# Patient Record
Sex: MALE | Race: WHITE | Employment: OTHER | URBAN - METROPOLITAN AREA
[De-identification: names, ages, dates, MRNs, and addresses within clinical notes are randomized per-mention and may not be internally consistent; named-entity substitution may affect disease eponyms.]

---

## 2017-03-14 ENCOUNTER — HOSPITAL ENCOUNTER (OUTPATIENT)
Dept: GENERAL RADIOLOGY | Age: 77
Discharge: HOME | End: 2017-03-14
Attending: FAMILY MEDICINE | Admitting: FAMILY MEDICINE

## 2017-03-14 ENCOUNTER — OFFICE VISIT (OUTPATIENT)
Dept: FAMILY MEDICINE CLINIC | Age: 77
End: 2017-03-14

## 2017-03-14 VITALS
SYSTOLIC BLOOD PRESSURE: 122 MMHG | OXYGEN SATURATION: 96 % | HEART RATE: 59 BPM | WEIGHT: 190.2 LBS | BODY MASS INDEX: 25.8 KG/M2 | DIASTOLIC BLOOD PRESSURE: 74 MMHG | TEMPERATURE: 96.4 F

## 2017-03-14 DIAGNOSIS — R07.89 ACUTE CHEST WALL PAIN: Primary | ICD-10-CM

## 2017-03-14 DIAGNOSIS — K52.9 GASTROENTERITIS: ICD-10-CM

## 2017-03-14 DIAGNOSIS — R07.89 OTHER CHEST PAIN: ICD-10-CM

## 2017-03-14 PROCEDURE — 99214 OFFICE O/P EST MOD 30 MIN: CPT | Performed by: FAMILY MEDICINE

## 2017-03-14 RX ORDER — CIPROFLOXACIN 250 MG/1
250 TABLET, FILM COATED ORAL 2 TIMES DAILY
Qty: 10 TABLET | Refills: 0 | Status: SHIPPED | OUTPATIENT
Start: 2017-03-14 | End: 2017-03-19

## 2017-03-14 RX ORDER — NAPROXEN 375 MG/1
375 TABLET ORAL EVERY 12 HOURS PRN
Qty: 60 TABLET | Refills: 0 | Status: SHIPPED | OUTPATIENT
Start: 2017-03-14 | End: 2017-06-21

## 2017-03-14 RX ORDER — BACLOFEN 10 MG/1
10 TABLET ORAL 2 TIMES DAILY
Qty: 20 TABLET | Refills: 0 | Status: SHIPPED | OUTPATIENT
Start: 2017-03-14 | End: 2017-06-21

## 2017-06-21 ENCOUNTER — OFFICE VISIT (OUTPATIENT)
Dept: FAMILY MEDICINE CLINIC | Age: 77
End: 2017-06-21

## 2017-06-21 VITALS
TEMPERATURE: 97 F | HEART RATE: 75 BPM | DIASTOLIC BLOOD PRESSURE: 78 MMHG | BODY MASS INDEX: 26.96 KG/M2 | WEIGHT: 198.8 LBS | OXYGEN SATURATION: 98 % | SYSTOLIC BLOOD PRESSURE: 138 MMHG

## 2017-06-21 DIAGNOSIS — E78.5 HYPERLIPIDEMIA ASSOCIATED WITH TYPE 2 DIABETES MELLITUS (HCC): ICD-10-CM

## 2017-06-21 DIAGNOSIS — Z23 NEED FOR PNEUMOCOCCAL VACCINATION: ICD-10-CM

## 2017-06-21 DIAGNOSIS — K21.9 GASTROESOPHAGEAL REFLUX DISEASE WITHOUT ESOPHAGITIS: ICD-10-CM

## 2017-06-21 DIAGNOSIS — F41.9 ANXIETY: ICD-10-CM

## 2017-06-21 DIAGNOSIS — I10 ESSENTIAL HYPERTENSION: ICD-10-CM

## 2017-06-21 DIAGNOSIS — E11.69 HYPERLIPIDEMIA ASSOCIATED WITH TYPE 2 DIABETES MELLITUS (HCC): ICD-10-CM

## 2017-06-21 DIAGNOSIS — E11.9 TYPE 2 DIABETES MELLITUS WITHOUT COMPLICATION, WITHOUT LONG-TERM CURRENT USE OF INSULIN (HCC): Primary | ICD-10-CM

## 2017-06-21 LAB — HBA1C MFR BLD: 7 %

## 2017-06-21 PROCEDURE — 99214 OFFICE O/P EST MOD 30 MIN: CPT | Performed by: FAMILY MEDICINE

## 2017-06-21 PROCEDURE — 83036 HEMOGLOBIN GLYCOSYLATED A1C: CPT | Performed by: FAMILY MEDICINE

## 2017-06-21 PROCEDURE — 3288F FALL RISK ASSESSMENT DOCD: CPT | Performed by: FAMILY MEDICINE

## 2017-06-21 PROCEDURE — G8510 SCR DEP NEG, NO PLAN REQD: HCPCS | Performed by: FAMILY MEDICINE

## 2017-06-21 PROCEDURE — G0009 ADMIN PNEUMOCOCCAL VACCINE: HCPCS | Performed by: FAMILY MEDICINE

## 2017-06-21 PROCEDURE — 90670 PCV13 VACCINE IM: CPT | Performed by: FAMILY MEDICINE

## 2017-06-21 RX ORDER — ESOMEPRAZOLE MAGNESIUM 40 MG/1
40 CAPSULE, DELAYED RELEASE ORAL
Qty: 90 CAPSULE | Refills: 1 | Status: SHIPPED | OUTPATIENT
Start: 2017-06-21 | End: 2017-12-19 | Stop reason: SDUPTHER

## 2017-06-21 RX ORDER — ATORVASTATIN CALCIUM 20 MG/1
20 TABLET, FILM COATED ORAL DAILY
Qty: 90 TABLET | Refills: 1 | Status: SHIPPED | OUTPATIENT
Start: 2017-06-21 | End: 2017-12-19 | Stop reason: SDUPTHER

## 2017-06-21 ASSESSMENT — PATIENT HEALTH QUESTIONNAIRE - PHQ9
1. LITTLE INTEREST OR PLEASURE IN DOING THINGS: 0
SUM OF ALL RESPONSES TO PHQ QUESTIONS 1-9: 0
2. FEELING DOWN, DEPRESSED OR HOPELESS: 0
SUM OF ALL RESPONSES TO PHQ9 QUESTIONS 1 & 2: 0

## 2017-06-22 LAB
A/G RATIO: 1.4 (CALC) (ref 0.8–2.6)
ALBUMIN SERPL-MCNC: 4.4 GM/DL (ref 3.5–5.2)
ALP BLD-CCNC: 49 U/L (ref 23–144)
ALT SERPL-CCNC: 32 U/L (ref 0–60)
AST SERPL-CCNC: 33 U/L (ref 0–55)
BILIRUB SERPL-MCNC: 0.6 MG/DL (ref 0–1.2)
BUN / CREAT RATIO: 16 (CALC) (ref 7–25)
BUN BLDV-MCNC: 19 MG/DL (ref 3–29)
CALCIUM SERPL-MCNC: 10.1 MG/DL (ref 8.5–10.5)
CHLORIDE BLD-SCNC: 102 MEQ/L (ref 96–110)
CHOLESTEROL, TOTAL: 99 MG/DL
CO2: 24 MEQ/L (ref 19–32)
COPY(IES) SENT TO:: NORMAL
CREAT SERPL-MCNC: 1.2 MG/DL
GFR SERPL CREATININE-BSD FRML MDRD: 59 ML/MIN/1.73M2
GLOBULIN: 3.2 GM/DL (CALC) (ref 1.9–3.6)
GLUCOSE BLD-MCNC: 124 MG/DL
HDLC SERPL-MCNC: 45 MG/DL
LDL CHOLESTEROL: 22 MG/DL (CALC)
POTASSIUM SERPL-SCNC: 4.5 MEQ/L (ref 3.4–5.3)
SODIUM BLD-SCNC: 141 MEQ/L (ref 135–148)
TOTAL PROTEIN: 7.6 GM/DL (ref 6–8.3)
TRIGL SERPL-MCNC: 162 MG/DL
VLDLC SERPL CALC-MCNC: 32 MG/DL (CALC) (ref 4–38)

## 2017-07-25 ENCOUNTER — HOSPITAL ENCOUNTER (OUTPATIENT)
Dept: GENERAL RADIOLOGY | Age: 77
Discharge: HOME | End: 2017-07-25
Attending: EMERGENCY MEDICINE | Admitting: EMERGENCY MEDICINE

## 2017-07-25 ENCOUNTER — OFFICE VISIT (OUTPATIENT)
Dept: FAMILY MEDICINE CLINIC | Age: 77
End: 2017-07-25

## 2017-07-25 VITALS
DIASTOLIC BLOOD PRESSURE: 88 MMHG | SYSTOLIC BLOOD PRESSURE: 148 MMHG | OXYGEN SATURATION: 95 % | HEART RATE: 68 BPM | TEMPERATURE: 97.3 F

## 2017-07-25 DIAGNOSIS — M54.40 ACUTE LOW BACK PAIN WITH SCIATICA, SCIATICA LATERALITY UNSPECIFIED, UNSPECIFIED BACK PAIN LATERALITY: ICD-10-CM

## 2017-07-25 DIAGNOSIS — M54.50 ACUTE MIDLINE LOW BACK PAIN WITHOUT SCIATICA: Primary | ICD-10-CM

## 2017-07-25 PROCEDURE — 99213 OFFICE O/P EST LOW 20 MIN: CPT | Performed by: FAMILY MEDICINE

## 2017-07-25 RX ORDER — NAPROXEN 500 MG/1
500 TABLET ORAL 2 TIMES DAILY WITH MEALS
Qty: 60 TABLET | Refills: 0 | Status: SHIPPED | OUTPATIENT
Start: 2017-07-25 | End: 2018-05-02 | Stop reason: ALTCHOICE

## 2017-07-25 RX ORDER — TRAMADOL HYDROCHLORIDE 50 MG/1
50-100 TABLET ORAL EVERY 6 HOURS PRN
Qty: 40 TABLET | Refills: 0 | Status: SHIPPED | OUTPATIENT
Start: 2017-07-25 | End: 2018-05-02 | Stop reason: ALTCHOICE

## 2017-07-25 RX ORDER — BACLOFEN 10 MG/1
10 TABLET ORAL 3 TIMES DAILY
Qty: 30 TABLET | Refills: 0 | Status: SHIPPED | OUTPATIENT
Start: 2017-07-25 | End: 2018-05-02 | Stop reason: ALTCHOICE

## 2017-12-19 ENCOUNTER — OFFICE VISIT (OUTPATIENT)
Dept: FAMILY MEDICINE CLINIC | Age: 77
End: 2017-12-19

## 2017-12-19 VITALS
HEART RATE: 88 BPM | SYSTOLIC BLOOD PRESSURE: 130 MMHG | WEIGHT: 197 LBS | DIASTOLIC BLOOD PRESSURE: 76 MMHG | BODY MASS INDEX: 26.72 KG/M2

## 2017-12-19 DIAGNOSIS — K21.9 GASTROESOPHAGEAL REFLUX DISEASE WITHOUT ESOPHAGITIS: ICD-10-CM

## 2017-12-19 DIAGNOSIS — E11.69 HYPERLIPIDEMIA ASSOCIATED WITH TYPE 2 DIABETES MELLITUS (HCC): ICD-10-CM

## 2017-12-19 DIAGNOSIS — F41.9 ANXIETY: ICD-10-CM

## 2017-12-19 DIAGNOSIS — F03.90 DEMENTIA WITHOUT BEHAVIORAL DISTURBANCE, UNSPECIFIED DEMENTIA TYPE: ICD-10-CM

## 2017-12-19 DIAGNOSIS — E11.9 TYPE 2 DIABETES MELLITUS WITHOUT COMPLICATION, WITHOUT LONG-TERM CURRENT USE OF INSULIN (HCC): Primary | ICD-10-CM

## 2017-12-19 DIAGNOSIS — E78.5 HYPERLIPIDEMIA ASSOCIATED WITH TYPE 2 DIABETES MELLITUS (HCC): ICD-10-CM

## 2017-12-19 DIAGNOSIS — R53.82 CHRONIC FATIGUE: ICD-10-CM

## 2017-12-19 LAB — HBA1C MFR BLD: 7 %

## 2017-12-19 PROCEDURE — 36415 COLL VENOUS BLD VENIPUNCTURE: CPT | Performed by: FAMILY MEDICINE

## 2017-12-19 PROCEDURE — G8510 SCR DEP NEG, NO PLAN REQD: HCPCS | Performed by: FAMILY MEDICINE

## 2017-12-19 PROCEDURE — 3288F FALL RISK ASSESSMENT DOCD: CPT | Performed by: FAMILY MEDICINE

## 2017-12-19 PROCEDURE — 83036 HEMOGLOBIN GLYCOSYLATED A1C: CPT | Performed by: FAMILY MEDICINE

## 2017-12-19 PROCEDURE — 99215 OFFICE O/P EST HI 40 MIN: CPT | Performed by: FAMILY MEDICINE

## 2017-12-19 RX ORDER — ATORVASTATIN CALCIUM 20 MG/1
20 TABLET, FILM COATED ORAL DAILY
Qty: 90 TABLET | Refills: 1 | Status: SHIPPED | OUTPATIENT
Start: 2017-12-19 | End: 2018-05-02 | Stop reason: SDUPTHER

## 2017-12-19 RX ORDER — ESOMEPRAZOLE MAGNESIUM 40 MG/1
40 CAPSULE, DELAYED RELEASE ORAL
Qty: 90 CAPSULE | Refills: 1 | Status: SHIPPED | OUTPATIENT
Start: 2017-12-19 | End: 2018-05-02 | Stop reason: SDUPTHER

## 2017-12-19 RX ORDER — DONEPEZIL HYDROCHLORIDE 5 MG/1
5 TABLET, FILM COATED ORAL NIGHTLY
Qty: 90 TABLET | Refills: 1 | Status: SHIPPED | OUTPATIENT
Start: 2017-12-19 | End: 2018-05-02 | Stop reason: SDUPTHER

## 2017-12-19 ASSESSMENT — PATIENT HEALTH QUESTIONNAIRE - PHQ9
SUM OF ALL RESPONSES TO PHQ QUESTIONS 1-9: 0
2. FEELING DOWN, DEPRESSED OR HOPELESS: 0
1. LITTLE INTEREST OR PLEASURE IN DOING THINGS: 0
SUM OF ALL RESPONSES TO PHQ9 QUESTIONS 1 & 2: 0

## 2017-12-19 NOTE — PROGRESS NOTES
Cole Aburto is a 69 y.o. male who presents for evaluation of hypertension, hyperlipidemia, and diabetes. Cleyoselin Strickland He indicates that he is feeling well and denies any symptoms referable to his elevated blood pressure. Specifically denies chest pain, palpitations, dyspnea, orthopnea, PND or peripheral edema. No anorexia, arthralgia, or leg cramps noted. Current medication regimen is as listed below. He denies any side effects of medication, and has been taking it regularly. medication compliance:  compliant all of the time, diabetic diet compliance:  compliant most of the time, home glucose monitoring: are usually normal, further diabetic ROS: no polyuria or polydipsia, no chest pain, dyspnea or TIAs, no numbness, tingling or pain in extremities, last eye exam approximately 2 months ago. Anxiety: Patient complains of evaluation of anxiety disorder. He has the following anxiety symptoms: irritable. Onset of symptoms was approximately several years ago, controlled since that time. He denies current suicidal and homicidal ideation. Family history significant for no psychiatric illness. Possible organic causes contributing are: none. Risk factors: previous episode of depression Previous treatment includes Zoloft . He complains of the following side effects from the treatment: none. Dementia: He is here for evaluation and treatment of cognitive problems. Primary caregiver is patient. The family and the patient identify problems with changes in short term memory and recalling words. Family and patient report problems with none. Family and patient are not concerned about medication errors, wandering, driving, cooking and inability to maintain adequate nutrition.  Medication administration: patient self medicates    Functional Assessment:   Activities of Daily Living (ADLs):    He is independent in the following: ambulation, bathing and hygiene, feeding, continence, grooming, toileting and dressing  Requires assistance (VITAMIN B-12 CR) 1000 MCG TBCR Take 2,500 mcg by mouth daily. No current facility-administered medications for this visit. No Known Allergies    Social History   Substance Use Topics    Smoking status: Never Smoker    Smokeless tobacco: Never Used      Comment: Caffeine - 2 cups coffee/day- reviewed 2/6/13    Alcohol use No          Objective:      /76   Pulse 88   Wt 197 lb (89.4 kg)   BMI 26.72 kg/m²   General: Alert and oriented, in no distress   S1 and S2 normal, no murmurs, clicks, gallops or rubs. Regular rate and rhythm. Chest is clear; no wheezes or rales. No edema or JVD. heart sounds regular rate and rhythm, S1, S2 normal, no murmur, click, rub or gallop, chest clear, no hepatosplenomegaly, no carotid bruits, feet: normal DP and PT pulses, normal monofilament exam and trophic changes calluses bilateral first MTPs   A1c 7.0    MMSE 27/30  Assessment:      1. Type 2 diabetes mellitus without complication, without long-term current use of insulin (HCC)  POCT glycosylated hemoglobin (Hb A1C)    sitaGLIPtan-metformin (JANUMET)  MG per tablet    Comprehensive Metabolic Panel   2. Anxiety  sertraline (ZOLOFT) 50 MG tablet   3. Hyperlipidemia associated with type 2 diabetes mellitus (HCC)  atorvastatin (LIPITOR) 20 MG tablet    Comprehensive Metabolic Panel    Lipid Panel   4. Gastroesophageal reflux disease without esophagitis  esomeprazole (NEXIUM) 40 MG delayed release capsule   5. Dementia without behavioral disturbance, unspecified dementia type  donepezil (ARICEPT) 5 MG tablet   6. Chronic fatigue  CBC    TSH without Reflex          Plan:     Start Aricept  1)  Medication: continue current medication regimen unchanged  2)  Recheck in 6 months, sooner should new symptoms or problems arise.      Jefferson received counseling on the following healthy behaviors: nutrition, exercise and medication adherence    Patient given educational materials on Diabetes    I have instructed Jefferson to complete a self tracking handout on Blood Sugars  and instructed them to bring it with them to his next appointment. Discussed use, benefit, and side effects of prescribed medications. Barriers to medication compliance addressed. All patient questions answered. Pt voiced understanding. Greater than 50% of this 40 minute visit spent on counseling on dementia, anxiety, and diabetes.

## 2017-12-20 LAB
A/G RATIO: 1.4 (CALC) (ref 0.8–2.6)
ALBUMIN SERPL-MCNC: 4.6 GM/DL (ref 3.5–5.2)
ALP BLD-CCNC: 49 U/L (ref 23–144)
ALT SERPL-CCNC: 38 U/L (ref 0–60)
AST SERPL-CCNC: 40 U/L (ref 0–55)
BASOPHILS ABSOLUTE: 0.2 K/MM3 (ref 0–0.3)
BASOPHILS RELATIVE PERCENT: 1.5 % (ref 0–2)
BILIRUB SERPL-MCNC: 0.8 MG/DL (ref 0–1.2)
BUN / CREAT RATIO: 19 (CALC) (ref 7–25)
BUN BLDV-MCNC: 27 MG/DL (ref 3–29)
CALCIUM SERPL-MCNC: 10.6 MG/DL (ref 8.5–10.5)
CHLORIDE BLD-SCNC: 100 MEQ/L (ref 96–110)
CHOLESTEROL, TOTAL: 100 MG/DL
CO2: 26 MEQ/L (ref 19–32)
COMMENT: ABNORMAL
COPY(IES) SENT TO:: NORMAL
CREAT SERPL-MCNC: 1.4 MG/DL
EOSINOPHILS ABSOLUTE: 0.6 K/MM3 (ref 0–0.6)
EOSINOPHILS RELATIVE PERCENT: 4.7 % (ref 0–7)
GFR SERPL CREATININE-BSD FRML MDRD: 48 ML/MIN/1.73M2
GLOBULIN: 3.3 GM/DL (CALC) (ref 1.9–3.6)
GLUCOSE BLD-MCNC: 146 MG/DL
HCT VFR BLD CALC: 44.3 % (ref 41–50)
HDLC SERPL-MCNC: 41 MG/DL
HEMOGLOBIN: 14.5 G/DL (ref 13.8–17.2)
LDL CHOLESTEROL: 25 MG/DL (CALC)
LEUKOCYTES, UA: 12.8 K/MM3 (ref 3.8–10.8)
LYMPHOCYTES ABSOLUTE: 5 K/MM3 (ref 0.9–4.1)
LYMPHOCYTES RELATIVE PERCENT: 38.7 % (ref 14–51)
MCH RBC QN AUTO: 30.7 PG (ref 27–33)
MCHC RBC AUTO-ENTMCNC: 32.7 G/DL (ref 32–36)
MCV RBC AUTO: 93.7 FL (ref 80–100)
MONOCYTES ABSOLUTE: 1 K/MM3 (ref 0.2–1.1)
MONOCYTES RELATIVE PERCENT: 7.8 % (ref 0–14)
NEUTROPHILS ABSOLUTE: 6.1 K/MM3 (ref 1.5–7.8)
PDW BLD-RTO: 13.9 % (ref 9–15)
PLATELET # BLD: 213 K/MM3 (ref 130–400)
POTASSIUM SERPL-SCNC: 5.1 MEQ/L (ref 3.4–5.3)
RBC # BLD: 4.73 M/MM3 (ref 4.4–5.8)
SEGMENTED NEUTROPHILS RELATIVE PERCENT: 47.3 % (ref 40–76)
SODIUM BLD-SCNC: 140 MEQ/L (ref 135–148)
TOTAL PROTEIN: 7.9 GM/DL (ref 6–8.3)
TRIGL SERPL-MCNC: 172 MG/DL
TSH SERPL DL<=0.05 MIU/L-ACNC: 5.72 MICRO IU/ML (ref 0.4–4)
VLDLC SERPL CALC-MCNC: 34 MG/DL (CALC) (ref 4–38)

## 2018-01-05 DIAGNOSIS — E03.9 ACQUIRED HYPOTHYROIDISM: Primary | ICD-10-CM

## 2018-01-05 RX ORDER — LEVOTHYROXINE SODIUM 0.05 MG/1
50 TABLET ORAL DAILY
Qty: 30 TABLET | Refills: 1 | Status: SHIPPED | OUTPATIENT
Start: 2018-01-05 | End: 2018-02-20 | Stop reason: SDUPTHER

## 2018-01-08 ENCOUNTER — TELEPHONE (OUTPATIENT)
Dept: FAMILY MEDICINE CLINIC | Age: 78
End: 2018-01-08

## 2018-01-08 NOTE — TELEPHONE ENCOUNTER
That medication should not affect his balance or cause any dizziness. He should continue medication and if symptoms persist more than 2 or 3 days, he should be seen.

## 2018-02-19 ENCOUNTER — OFFICE VISIT (OUTPATIENT)
Dept: FAMILY MEDICINE CLINIC | Age: 78
End: 2018-02-19

## 2018-02-19 VITALS
BODY MASS INDEX: 26.28 KG/M2 | RESPIRATION RATE: 14 BRPM | TEMPERATURE: 96.1 F | HEIGHT: 72 IN | WEIGHT: 194 LBS | DIASTOLIC BLOOD PRESSURE: 78 MMHG | SYSTOLIC BLOOD PRESSURE: 134 MMHG | OXYGEN SATURATION: 98 % | HEART RATE: 84 BPM

## 2018-02-19 DIAGNOSIS — E11.9 TYPE 2 DIABETES MELLITUS WITHOUT COMPLICATION, WITHOUT LONG-TERM CURRENT USE OF INSULIN (HCC): ICD-10-CM

## 2018-02-19 DIAGNOSIS — E03.9 ACQUIRED HYPOTHYROIDISM: Primary | ICD-10-CM

## 2018-02-19 DIAGNOSIS — K52.9 ACUTE GASTROENTERITIS: ICD-10-CM

## 2018-02-19 DIAGNOSIS — G43.001 MIGRAINE WITHOUT AURA AND WITH STATUS MIGRAINOSUS, NOT INTRACTABLE: ICD-10-CM

## 2018-02-19 LAB
COPY(IES) SENT TO:: NORMAL
TSH SERPL DL<=0.05 MIU/L-ACNC: 1.49 MICRO IU/ML (ref 0.4–4)

## 2018-02-19 PROCEDURE — 1036F TOBACCO NON-USER: CPT | Performed by: FAMILY MEDICINE

## 2018-02-19 PROCEDURE — G8427 DOCREV CUR MEDS BY ELIG CLIN: HCPCS | Performed by: FAMILY MEDICINE

## 2018-02-19 PROCEDURE — 1123F ACP DISCUSS/DSCN MKR DOCD: CPT | Performed by: FAMILY MEDICINE

## 2018-02-19 PROCEDURE — G8510 SCR DEP NEG, NO PLAN REQD: HCPCS | Performed by: FAMILY MEDICINE

## 2018-02-19 PROCEDURE — 99214 OFFICE O/P EST MOD 30 MIN: CPT | Performed by: FAMILY MEDICINE

## 2018-02-19 PROCEDURE — 36415 COLL VENOUS BLD VENIPUNCTURE: CPT | Performed by: FAMILY MEDICINE

## 2018-02-19 PROCEDURE — G8484 FLU IMMUNIZE NO ADMIN: HCPCS | Performed by: FAMILY MEDICINE

## 2018-02-19 PROCEDURE — 4040F PNEUMOC VAC/ADMIN/RCVD: CPT | Performed by: FAMILY MEDICINE

## 2018-02-19 PROCEDURE — G8419 CALC BMI OUT NRM PARAM NOF/U: HCPCS | Performed by: FAMILY MEDICINE

## 2018-02-19 PROCEDURE — 3288F FALL RISK ASSESSMENT DOCD: CPT | Performed by: FAMILY MEDICINE

## 2018-02-19 RX ORDER — BUTALBITAL, ACETAMINOPHEN AND CAFFEINE 50; 325; 40 MG/1; MG/1; MG/1
1 TABLET ORAL EVERY 4 HOURS PRN
Qty: 60 TABLET | Refills: 3 | Status: SHIPPED | OUTPATIENT
Start: 2018-02-19 | End: 2018-05-02 | Stop reason: ALTCHOICE

## 2018-02-19 RX ORDER — CIPROFLOXACIN 250 MG/1
250 TABLET, FILM COATED ORAL 2 TIMES DAILY
Qty: 10 TABLET | Refills: 0 | Status: SHIPPED | OUTPATIENT
Start: 2018-02-19 | End: 2018-02-24

## 2018-02-19 ASSESSMENT — PATIENT HEALTH QUESTIONNAIRE - PHQ9
2. FEELING DOWN, DEPRESSED OR HOPELESS: 0
1. LITTLE INTEREST OR PLEASURE IN DOING THINGS: 0
SUM OF ALL RESPONSES TO PHQ9 QUESTIONS 1 & 2: 0
SUM OF ALL RESPONSES TO PHQ QUESTIONS 1-9: 0

## 2018-02-19 NOTE — PROGRESS NOTES
butalbital-acetaminophen-caffeine (FIORICET, ESGIC) -40 MG per tablet   4. Acute gastroenteritis  ciprofloxacin (CIPRO) 250 MG tablet         (P) we'll stop Janumet and start Januvia instead. I have recommended small amounts clear fluids frequently, soups, juices, water and advance diet as tolerated. Return office visit if symptoms persist or worsen; I have alerted the patient to call if high fever, dehydration, marked weakness, fainting, increased abdominal pain, blood in stool or vomit.

## 2018-02-20 DIAGNOSIS — E03.9 ACQUIRED HYPOTHYROIDISM: ICD-10-CM

## 2018-02-20 RX ORDER — LEVOTHYROXINE SODIUM 0.05 MG/1
50 TABLET ORAL DAILY
Qty: 90 TABLET | Refills: 1 | Status: SHIPPED | OUTPATIENT
Start: 2018-02-20 | End: 2018-02-20 | Stop reason: SDUPTHER

## 2018-02-20 RX ORDER — LEVOTHYROXINE SODIUM 0.05 MG/1
50 TABLET ORAL DAILY
Qty: 90 TABLET | Refills: 1 | Status: SHIPPED | OUTPATIENT
Start: 2018-02-20 | End: 2018-05-02 | Stop reason: SDUPTHER

## 2018-04-26 ENCOUNTER — TELEPHONE (OUTPATIENT)
Dept: FAMILY MEDICINE CLINIC | Age: 78
End: 2018-04-26

## 2018-05-02 ENCOUNTER — OFFICE VISIT (OUTPATIENT)
Dept: FAMILY MEDICINE CLINIC | Age: 78
End: 2018-05-02

## 2018-05-02 VITALS
DIASTOLIC BLOOD PRESSURE: 76 MMHG | BODY MASS INDEX: 26.42 KG/M2 | WEIGHT: 194.8 LBS | TEMPERATURE: 96.8 F | OXYGEN SATURATION: 98 % | SYSTOLIC BLOOD PRESSURE: 136 MMHG | HEART RATE: 68 BPM

## 2018-05-02 DIAGNOSIS — E78.5 HYPERLIPIDEMIA ASSOCIATED WITH TYPE 2 DIABETES MELLITUS (HCC): ICD-10-CM

## 2018-05-02 DIAGNOSIS — F03.90 DEMENTIA WITHOUT BEHAVIORAL DISTURBANCE, UNSPECIFIED DEMENTIA TYPE: ICD-10-CM

## 2018-05-02 DIAGNOSIS — E78.00 PURE HYPERCHOLESTEROLEMIA: ICD-10-CM

## 2018-05-02 DIAGNOSIS — E03.9 ACQUIRED HYPOTHYROIDISM: ICD-10-CM

## 2018-05-02 DIAGNOSIS — F41.9 ANXIETY: ICD-10-CM

## 2018-05-02 DIAGNOSIS — E11.9 TYPE 2 DIABETES MELLITUS WITHOUT COMPLICATION, WITHOUT LONG-TERM CURRENT USE OF INSULIN (HCC): Primary | ICD-10-CM

## 2018-05-02 DIAGNOSIS — K21.9 GASTROESOPHAGEAL REFLUX DISEASE WITHOUT ESOPHAGITIS: ICD-10-CM

## 2018-05-02 DIAGNOSIS — E11.69 HYPERLIPIDEMIA ASSOCIATED WITH TYPE 2 DIABETES MELLITUS (HCC): ICD-10-CM

## 2018-05-02 LAB — HBA1C MFR BLD: 8.6 %

## 2018-05-02 PROCEDURE — 99214 OFFICE O/P EST MOD 30 MIN: CPT | Performed by: FAMILY MEDICINE

## 2018-05-02 PROCEDURE — 1123F ACP DISCUSS/DSCN MKR DOCD: CPT | Performed by: FAMILY MEDICINE

## 2018-05-02 PROCEDURE — G8427 DOCREV CUR MEDS BY ELIG CLIN: HCPCS | Performed by: FAMILY MEDICINE

## 2018-05-02 PROCEDURE — G8419 CALC BMI OUT NRM PARAM NOF/U: HCPCS | Performed by: FAMILY MEDICINE

## 2018-05-02 PROCEDURE — 4040F PNEUMOC VAC/ADMIN/RCVD: CPT | Performed by: FAMILY MEDICINE

## 2018-05-02 PROCEDURE — 1036F TOBACCO NON-USER: CPT | Performed by: FAMILY MEDICINE

## 2018-05-02 PROCEDURE — 36415 COLL VENOUS BLD VENIPUNCTURE: CPT | Performed by: FAMILY MEDICINE

## 2018-05-02 PROCEDURE — 83036 HEMOGLOBIN GLYCOSYLATED A1C: CPT | Performed by: FAMILY MEDICINE

## 2018-05-02 RX ORDER — ATORVASTATIN CALCIUM 20 MG/1
20 TABLET, FILM COATED ORAL DAILY
Qty: 90 TABLET | Refills: 1 | Status: SHIPPED | OUTPATIENT
Start: 2018-05-02 | End: 2018-11-02 | Stop reason: SDUPTHER

## 2018-05-02 RX ORDER — DONEPEZIL HYDROCHLORIDE 5 MG/1
5 TABLET, FILM COATED ORAL NIGHTLY
Qty: 90 TABLET | Refills: 1 | Status: SHIPPED | OUTPATIENT
Start: 2018-05-02 | End: 2018-11-02 | Stop reason: SDUPTHER

## 2018-05-02 RX ORDER — ESOMEPRAZOLE MAGNESIUM 40 MG/1
40 CAPSULE, DELAYED RELEASE ORAL
Qty: 90 CAPSULE | Refills: 1 | Status: SHIPPED | OUTPATIENT
Start: 2018-05-02 | End: 2018-11-02 | Stop reason: SDUPTHER

## 2018-05-02 RX ORDER — PIOGLITAZONEHYDROCHLORIDE 45 MG/1
45 TABLET ORAL DAILY
Qty: 90 TABLET | Refills: 1 | Status: SHIPPED | OUTPATIENT
Start: 2018-05-02 | End: 2018-11-02 | Stop reason: SDUPTHER

## 2018-05-02 RX ORDER — LEVOTHYROXINE SODIUM 0.05 MG/1
50 TABLET ORAL DAILY
Qty: 90 TABLET | Refills: 1 | Status: SHIPPED | OUTPATIENT
Start: 2018-05-02 | End: 2018-11-02 | Stop reason: SDUPTHER

## 2018-05-02 ASSESSMENT — PATIENT HEALTH QUESTIONNAIRE - PHQ9
1. LITTLE INTEREST OR PLEASURE IN DOING THINGS: 0
2. FEELING DOWN, DEPRESSED OR HOPELESS: 0
SUM OF ALL RESPONSES TO PHQ9 QUESTIONS 1 & 2: 0
SUM OF ALL RESPONSES TO PHQ QUESTIONS 1-9: 0

## 2018-05-03 LAB
A/G RATIO: 1.4 (CALC) (ref 0.8–2.6)
ALBUMIN SERPL-MCNC: 4.4 GM/DL (ref 3.5–5.2)
ALP BLD-CCNC: 51 U/L (ref 23–144)
ALT SERPL-CCNC: 35 U/L (ref 0–60)
AST SERPL-CCNC: 35 U/L (ref 0–55)
BILIRUB SERPL-MCNC: 0.9 MG/DL (ref 0–1.2)
BUN / CREAT RATIO: 14 (CALC) (ref 7–25)
BUN BLDV-MCNC: 17 MG/DL (ref 3–29)
CALCIUM SERPL-MCNC: 9.6 MG/DL (ref 8.5–10.5)
CHLORIDE BLD-SCNC: 101 MEQ/L (ref 96–110)
CHOLESTEROL, TOTAL: 106 MG/DL
CO2: 21 MEQ/L (ref 19–32)
COPY(IES) SENT TO:: NORMAL
CREAT SERPL-MCNC: 1.2 MG/DL
GFR SERPL CREATININE-BSD FRML MDRD: 58 ML/MIN/1.73M2
GLOBULIN: 3.1 GM/DL (CALC) (ref 1.9–3.6)
GLUCOSE BLD-MCNC: 207 MG/DL
HDLC SERPL-MCNC: 45 MG/DL
LDL CHOLESTEROL: 30 MG/DL (CALC)
POTASSIUM SERPL-SCNC: 4.4 MEQ/L (ref 3.4–5.3)
SODIUM BLD-SCNC: 138 MEQ/L (ref 135–148)
TOTAL PROTEIN: 7.5 GM/DL (ref 6–8.3)
TRIGL SERPL-MCNC: 157 MG/DL
VLDLC SERPL CALC-MCNC: 31 MG/DL (CALC) (ref 4–38)

## 2018-08-02 ENCOUNTER — OFFICE VISIT (OUTPATIENT)
Dept: FAMILY MEDICINE CLINIC | Age: 78
End: 2018-08-02

## 2018-08-02 VITALS
HEART RATE: 58 BPM | BODY MASS INDEX: 26.88 KG/M2 | SYSTOLIC BLOOD PRESSURE: 124 MMHG | WEIGHT: 198.2 LBS | OXYGEN SATURATION: 98 % | TEMPERATURE: 96.7 F | DIASTOLIC BLOOD PRESSURE: 68 MMHG

## 2018-08-02 DIAGNOSIS — E78.5 HYPERLIPIDEMIA ASSOCIATED WITH TYPE 2 DIABETES MELLITUS (HCC): ICD-10-CM

## 2018-08-02 DIAGNOSIS — E11.9 TYPE 2 DIABETES MELLITUS WITHOUT COMPLICATION, WITHOUT LONG-TERM CURRENT USE OF INSULIN (HCC): Primary | ICD-10-CM

## 2018-08-02 DIAGNOSIS — E11.69 HYPERLIPIDEMIA ASSOCIATED WITH TYPE 2 DIABETES MELLITUS (HCC): ICD-10-CM

## 2018-08-02 LAB — HBA1C MFR BLD: 7.2 %

## 2018-08-02 PROCEDURE — 83036 HEMOGLOBIN GLYCOSYLATED A1C: CPT | Performed by: FAMILY MEDICINE

## 2018-08-02 PROCEDURE — 1101F PT FALLS ASSESS-DOCD LE1/YR: CPT | Performed by: FAMILY MEDICINE

## 2018-08-02 PROCEDURE — 4040F PNEUMOC VAC/ADMIN/RCVD: CPT | Performed by: FAMILY MEDICINE

## 2018-08-02 PROCEDURE — G8427 DOCREV CUR MEDS BY ELIG CLIN: HCPCS | Performed by: FAMILY MEDICINE

## 2018-08-02 PROCEDURE — 1036F TOBACCO NON-USER: CPT | Performed by: FAMILY MEDICINE

## 2018-08-02 PROCEDURE — 1123F ACP DISCUSS/DSCN MKR DOCD: CPT | Performed by: FAMILY MEDICINE

## 2018-08-02 PROCEDURE — 99214 OFFICE O/P EST MOD 30 MIN: CPT | Performed by: FAMILY MEDICINE

## 2018-08-02 PROCEDURE — G8419 CALC BMI OUT NRM PARAM NOF/U: HCPCS | Performed by: FAMILY MEDICINE

## 2018-08-02 NOTE — PROGRESS NOTES
Adalberto Maurice is a 69 y.o. male who presents for evaluation of hyperlipidemia and diabetes. Sunniford Mila He indicates that he is feeling well and denies any symptoms referable to his elevated blood pressure. Specifically denies chest pain, palpitations, dyspnea, orthopnea, PND or peripheral edema. No anorexia, arthralgia, or leg cramps noted. Current medication regimen is as listed below. He denies any side effects of medication, and has been taking it regularly. medication compliance:  compliant all of the time, diabetic diet compliance:  compliant most of the time, home glucose monitoring: are performed regularly, values range 150-170, further diabetic ROS: no polyuria or polydipsia, no chest pain, dyspnea or TIAs, no numbness, tingling or pain in extremities, last eye exam approximately 10 months ago. ROS: No TIA's or unusual headaches, no dysphagia. No prolonged cough. No dyspnea or chest pain on exertion. No abdominal pain, change in bowel habits, black or bloody stools. No urinary tract or BPH symptoms. No new or unusual musculoskeletal symptoms. Current Outpatient Prescriptions   Medication Sig Dispense Refill    donepezil (ARICEPT) 5 MG tablet Take 1 tablet by mouth nightly 90 tablet 1    sertraline (ZOLOFT) 50 MG tablet Take 1 tablet by mouth daily 90 tablet 1    esomeprazole (NEXIUM) 40 MG delayed release capsule Take 1 capsule by mouth every morning (before breakfast) 90 capsule 1    atorvastatin (LIPITOR) 20 MG tablet Take 1 tablet by mouth daily 90 tablet 1    SITagliptin (JANUVIA) 100 MG tablet Take 1 tablet by mouth daily 90 tablet 1    levothyroxine (SYNTHROID) 50 MCG tablet Take 1 tablet by mouth Daily 90 tablet 1    pioglitazone (ACTOS) 45 MG tablet Take 1 tablet by mouth daily 90 tablet 1    VITAMIN D-3 (COLECALCIFEROL) 400 UNITS TABS Take  by mouth daily.  Cyanocobalamin (VITAMIN B-12 CR) 1000 MCG TBCR Take 2,500 mcg by mouth daily.        No current facility-administered medications for this visit. No Known Allergies    Social History   Substance Use Topics    Smoking status: Never Smoker    Smokeless tobacco: Never Used      Comment: Caffeine - 2 cups coffee/day- reviewed 2/6/13    Alcohol use No          Objective:      /68 (Site: Right Arm, Position: Sitting, Cuff Size: Medium Adult)   Pulse 58   Temp 96.7 °F (35.9 °C) (Temporal)   Wt 198 lb 3.2 oz (89.9 kg)   SpO2 98%   BMI 26.88 kg/m²   General: Alert and oriented, in no distress   S1 and S2 normal, no murmurs, clicks, gallops or rubs. Regular rate and rhythm. Chest is clear; no wheezes or rales. No edema or JVD. heart sounds regular rate and rhythm, S1, S2 normal, no murmur, click, rub or gallop, chest clear, no hepatosplenomegaly, no carotid bruits, feet: normal DP and PT pulses, no trophic changes or ulcerative lesions and normal monofilament exam  A1c 7.2%     Assessment:      Hyperlipidemia - asymptomatic  Diabetes--well controlled and asymptomatic     Plan:       1)  Medication: continue current medication regimen unchanged  2)  Recheck in 3 months, sooner should new symptoms or problems arise. Jefferson received counseling on the following healthy behaviors: nutrition, exercise and medication adherence    Patient given educational materials on Diabetes    I have instructed Jefferson to complete a self tracking handout on Blood Sugars  and instructed them to bring it with them to his next appointment. Discussed use, benefit, and side effects of prescribed medications. Barriers to medication compliance addressed. All patient questions answered. Pt voiced understanding.

## 2018-11-02 ENCOUNTER — HOSPITAL ENCOUNTER (OUTPATIENT)
Age: 78
Discharge: HOME | End: 2018-11-02
Payer: COMMERCIAL

## 2018-11-02 ENCOUNTER — HOSPITAL ENCOUNTER (OUTPATIENT)
Dept: GENERAL RADIOLOGY | Age: 78
Discharge: HOME | End: 2018-11-02
Payer: COMMERCIAL

## 2018-11-02 ENCOUNTER — OFFICE VISIT (OUTPATIENT)
Dept: FAMILY MEDICINE CLINIC | Age: 78
End: 2018-11-02
Payer: COMMERCIAL

## 2018-11-02 VITALS
TEMPERATURE: 96.4 F | SYSTOLIC BLOOD PRESSURE: 138 MMHG | HEART RATE: 88 BPM | WEIGHT: 206.8 LBS | DIASTOLIC BLOOD PRESSURE: 82 MMHG | BODY MASS INDEX: 28.05 KG/M2 | OXYGEN SATURATION: 94 %

## 2018-11-02 DIAGNOSIS — F41.9 ANXIETY: ICD-10-CM

## 2018-11-02 DIAGNOSIS — E03.9 ACQUIRED HYPOTHYROIDISM: ICD-10-CM

## 2018-11-02 DIAGNOSIS — R07.89 CHEST WALL PAIN: ICD-10-CM

## 2018-11-02 DIAGNOSIS — E78.5 HYPERLIPIDEMIA ASSOCIATED WITH TYPE 2 DIABETES MELLITUS (HCC): ICD-10-CM

## 2018-11-02 DIAGNOSIS — R06.09 DYSPNEA ON EXERTION: ICD-10-CM

## 2018-11-02 DIAGNOSIS — E11.9 TYPE 2 DIABETES MELLITUS WITHOUT COMPLICATION, WITHOUT LONG-TERM CURRENT USE OF INSULIN (HCC): Primary | ICD-10-CM

## 2018-11-02 DIAGNOSIS — K21.9 GASTROESOPHAGEAL REFLUX DISEASE WITHOUT ESOPHAGITIS: ICD-10-CM

## 2018-11-02 DIAGNOSIS — E11.69 HYPERLIPIDEMIA ASSOCIATED WITH TYPE 2 DIABETES MELLITUS (HCC): ICD-10-CM

## 2018-11-02 DIAGNOSIS — F03.90 DEMENTIA WITHOUT BEHAVIORAL DISTURBANCE, UNSPECIFIED DEMENTIA TYPE: ICD-10-CM

## 2018-11-02 LAB — HBA1C MFR BLD: 7.1 %

## 2018-11-02 PROCEDURE — 83036 HEMOGLOBIN GLYCOSYLATED A1C: CPT | Performed by: FAMILY MEDICINE

## 2018-11-02 PROCEDURE — 1101F PT FALLS ASSESS-DOCD LE1/YR: CPT | Performed by: FAMILY MEDICINE

## 2018-11-02 PROCEDURE — 1036F TOBACCO NON-USER: CPT | Performed by: FAMILY MEDICINE

## 2018-11-02 PROCEDURE — 99214 OFFICE O/P EST MOD 30 MIN: CPT | Performed by: FAMILY MEDICINE

## 2018-11-02 PROCEDURE — G8427 DOCREV CUR MEDS BY ELIG CLIN: HCPCS | Performed by: FAMILY MEDICINE

## 2018-11-02 PROCEDURE — 4040F PNEUMOC VAC/ADMIN/RCVD: CPT | Performed by: FAMILY MEDICINE

## 2018-11-02 PROCEDURE — G8482 FLU IMMUNIZE ORDER/ADMIN: HCPCS | Performed by: FAMILY MEDICINE

## 2018-11-02 PROCEDURE — 1123F ACP DISCUSS/DSCN MKR DOCD: CPT | Performed by: FAMILY MEDICINE

## 2018-11-02 PROCEDURE — 36415 COLL VENOUS BLD VENIPUNCTURE: CPT | Performed by: FAMILY MEDICINE

## 2018-11-02 PROCEDURE — 71046 X-RAY EXAM CHEST 2 VIEWS: CPT

## 2018-11-02 PROCEDURE — G8419 CALC BMI OUT NRM PARAM NOF/U: HCPCS | Performed by: FAMILY MEDICINE

## 2018-11-02 RX ORDER — ATORVASTATIN CALCIUM 20 MG/1
20 TABLET, FILM COATED ORAL DAILY
Qty: 90 TABLET | Refills: 1 | Status: SHIPPED | OUTPATIENT
Start: 2018-11-02 | End: 2019-03-29 | Stop reason: SDUPTHER

## 2018-11-02 RX ORDER — PIOGLITAZONEHYDROCHLORIDE 45 MG/1
45 TABLET ORAL DAILY
Qty: 90 TABLET | Refills: 1 | Status: SHIPPED | OUTPATIENT
Start: 2018-11-02 | End: 2019-03-29 | Stop reason: SDUPTHER

## 2018-11-02 RX ORDER — ESOMEPRAZOLE MAGNESIUM 40 MG/1
40 CAPSULE, DELAYED RELEASE ORAL
Qty: 90 CAPSULE | Refills: 1 | Status: SHIPPED | OUTPATIENT
Start: 2018-11-02 | End: 2019-01-21

## 2018-11-02 RX ORDER — LEVOTHYROXINE SODIUM 0.05 MG/1
50 TABLET ORAL DAILY
Qty: 90 TABLET | Refills: 1 | Status: SHIPPED | OUTPATIENT
Start: 2018-11-02 | End: 2019-03-26 | Stop reason: SDUPTHER

## 2018-11-02 RX ORDER — DONEPEZIL HYDROCHLORIDE 5 MG/1
5 TABLET, FILM COATED ORAL NIGHTLY
Qty: 90 TABLET | Refills: 1 | Status: SHIPPED | OUTPATIENT
Start: 2018-11-02 | End: 2019-03-29 | Stop reason: SDUPTHER

## 2018-11-02 ASSESSMENT — PATIENT HEALTH QUESTIONNAIRE - PHQ9
2. FEELING DOWN, DEPRESSED OR HOPELESS: 0
1. LITTLE INTEREST OR PLEASURE IN DOING THINGS: 0
SUM OF ALL RESPONSES TO PHQ9 QUESTIONS 1 & 2: 0
SUM OF ALL RESPONSES TO PHQ QUESTIONS 1-9: 0
SUM OF ALL RESPONSES TO PHQ QUESTIONS 1-9: 0

## 2018-11-02 NOTE — PATIENT INSTRUCTIONS
Patient Education        Shortness of Breath: Care Instructions  Your Care Instructions  Shortness of breath has many causes. Sometimes conditions such as anxiety can lead to shortness of breath. Some people get mild shortness of breath when they exercise. Trouble breathing also can be a symptom of a serious problem, such as asthma, lung disease, emphysema, heart problems, and pneumonia. If your shortness of breath continues, you may need tests and treatment. Watch for any changes in your breathing and other symptoms. Follow-up care is a key part of your treatment and safety. Be sure to make and go to all appointments, and call your doctor if you are having problems. It's also a good idea to know your test results and keep a list of the medicines you take. How can you care for yourself at home? · Do not smoke or allow others to smoke around you. If you need help quitting, talk to your doctor about stop-smoking programs and medicines. These can increase your chances of quitting for good. · Get plenty of rest and sleep. · Take your medicines exactly as prescribed. Call your doctor if you think you are having a problem with your medicine. · Find healthy ways to deal with stress. ¨ Exercise daily. ¨ Get plenty of sleep. ¨ Eat regularly and well. When should you call for help? Call 911 anytime you think you may need emergency care. For example, call if:    · You have severe shortness of breath.     · You have symptoms of a heart attack. These may include:  ¨ Chest pain or pressure, or a strange feeling in the chest.  ¨ Sweating. ¨ Shortness of breath. ¨ Nausea or vomiting. ¨ Pain, pressure, or a strange feeling in the back, neck, jaw, or upper belly or in one or both shoulders or arms. ¨ Lightheadedness or sudden weakness. ¨ A fast or irregular heartbeat. After you call 911, the  may tell you to chew 1 adult-strength or 2 to 4 low-dose aspirin. Wait for an ambulance.  Do not try to drive yourself.    Call your doctor now or seek immediate medical care if:    · Your shortness of breath gets worse or you start to wheeze. Wheezing is a high-pitched sound when you breathe.     · You wake up at night out of breath or have to prop your head up on several pillows to breathe.     · You are short of breath after only light activity or while at rest.    Watch closely for changes in your health, and be sure to contact your doctor if:    · You do not get better over the next 1 to 2 days. Where can you learn more? Go to https://[email or web address].[email or web address] org and sign in to your [employer] account. Enter S780 in the [employer] box to learn more about \"Shortness of Breath: Care Instructions. \"     If you do not have an account, please click on the \"Sign Up Now\" link. Current as of: December 6, 2017  Content Version: 11.7  © 6257-0451 [employer]. Care instructions adapted under license by [facility] ([facility]). If you have questions about a medical condition or this instruction, always ask your healthcare professional. [place] [address] any warranty or liability for your use of this information.

## 2018-11-03 LAB
A/G RATIO: 1.8 (CALC) (ref 0.8–2.6)
ALBUMIN SERPL-MCNC: 4.9 GM/DL (ref 3.5–5.2)
ALP BLD-CCNC: 50 U/L (ref 23–144)
ALT SERPL-CCNC: 23 U/L (ref 0–60)
AST SERPL-CCNC: 24 U/L (ref 0–55)
BASOPHILS ABSOLUTE: 0 K/MM3 (ref 0–0.3)
BASOPHILS RELATIVE PERCENT: 0.6 % (ref 0–2)
BILIRUB SERPL-MCNC: 1 MG/DL (ref 0–1.2)
BUN / CREAT RATIO: 14 (CALC) (ref 7–25)
BUN BLDV-MCNC: 18 MG/DL (ref 3–29)
CALCIUM SERPL-MCNC: 10.3 MG/DL (ref 8.5–10.5)
CHLORIDE BLD-SCNC: 100 MEQ/L (ref 96–110)
CHOLESTEROL, TOTAL: 117 MG/DL
CO2: 28 MEQ/L (ref 19–32)
COPY(IES) SENT TO:: NORMAL
CREAT SERPL-MCNC: 1.3 MG/DL
EOSINOPHILS ABSOLUTE: 0.2 K/MM3 (ref 0–0.6)
EOSINOPHILS RELATIVE PERCENT: 2.7 % (ref 0–7)
GFR SERPL CREATININE-BSD FRML MDRD: 53 ML/MIN/1.73M2
GLOBULIN: 2.8 GM/DL (CALC) (ref 1.9–3.6)
GLUCOSE BLD-MCNC: 126 MG/DL
HCT VFR BLD CALC: 43.3 % (ref 41–50)
HDLC SERPL-MCNC: 43 MG/DL
HEMOGLOBIN: 14.4 G/DL (ref 13.8–17.2)
LDL CHOLESTEROL: 38 MG/DL (CALC)
LEUKOCYTES, UA: 7.5 K/MM3 (ref 3.8–10.8)
LYMPHOCYTES ABSOLUTE: 2.6 K/MM3 (ref 0.9–4.1)
LYMPHOCYTES RELATIVE PERCENT: 35.2 % (ref 14–51)
MCH RBC QN AUTO: 31.8 PG (ref 27–33)
MCHC RBC AUTO-ENTMCNC: 33.4 G/DL (ref 32–36)
MCV RBC AUTO: 95.3 FL (ref 80–100)
MONOCYTES ABSOLUTE: 0.5 K/MM3 (ref 0.2–1.1)
MONOCYTES RELATIVE PERCENT: 7.3 % (ref 0–14)
NEUTROPHILS ABSOLUTE: 4.1 K/MM3 (ref 1.5–7.8)
PDW BLD-RTO: 13.7 % (ref 9–15)
PLATELET # BLD: 180 K/MM3 (ref 130–400)
POTASSIUM SERPL-SCNC: 4 MEQ/L (ref 3.4–5.3)
RBC # BLD: 4.54 M/MM3 (ref 4.4–5.8)
SEGMENTED NEUTROPHILS RELATIVE PERCENT: 54.2 % (ref 40–76)
SODIUM BLD-SCNC: 140 MEQ/L (ref 135–148)
TOTAL PROTEIN: 7.7 GM/DL (ref 6–8.3)
TRIGL SERPL-MCNC: 178 MG/DL
TSH SERPL DL<=0.05 MIU/L-ACNC: 2.37 MICRO IU/ML (ref 0.4–4)
VLDLC SERPL CALC-MCNC: 36 MG/DL (CALC) (ref 4–38)

## 2018-11-06 ENCOUNTER — OFFICE VISIT (OUTPATIENT)
Dept: CARDIOLOGY CLINIC | Age: 78
End: 2018-11-06
Payer: COMMERCIAL

## 2018-11-06 VITALS
HEIGHT: 72 IN | WEIGHT: 208 LBS | HEART RATE: 79 BPM | BODY MASS INDEX: 28.17 KG/M2 | SYSTOLIC BLOOD PRESSURE: 128 MMHG | DIASTOLIC BLOOD PRESSURE: 72 MMHG

## 2018-11-06 DIAGNOSIS — I34.1 MVP (MITRAL VALVE PROLAPSE): ICD-10-CM

## 2018-11-06 DIAGNOSIS — R53.82 CHRONIC FATIGUE: ICD-10-CM

## 2018-11-06 DIAGNOSIS — E03.9 ACQUIRED HYPOTHYROIDISM: ICD-10-CM

## 2018-11-06 DIAGNOSIS — R07.9 CHEST PAIN, UNSPECIFIED TYPE: ICD-10-CM

## 2018-11-06 DIAGNOSIS — I25.10 ASCVD (ARTERIOSCLEROTIC CARDIOVASCULAR DISEASE): ICD-10-CM

## 2018-11-06 DIAGNOSIS — R06.02 SOB (SHORTNESS OF BREATH): Primary | ICD-10-CM

## 2018-11-06 PROCEDURE — 1101F PT FALLS ASSESS-DOCD LE1/YR: CPT | Performed by: INTERNAL MEDICINE

## 2018-11-06 PROCEDURE — 99204 OFFICE O/P NEW MOD 45 MIN: CPT | Performed by: INTERNAL MEDICINE

## 2018-11-06 PROCEDURE — G8482 FLU IMMUNIZE ORDER/ADMIN: HCPCS | Performed by: INTERNAL MEDICINE

## 2018-11-06 PROCEDURE — 93000 ELECTROCARDIOGRAM COMPLETE: CPT | Performed by: INTERNAL MEDICINE

## 2018-11-06 PROCEDURE — G8419 CALC BMI OUT NRM PARAM NOF/U: HCPCS | Performed by: INTERNAL MEDICINE

## 2018-11-06 PROCEDURE — G8427 DOCREV CUR MEDS BY ELIG CLIN: HCPCS | Performed by: INTERNAL MEDICINE

## 2018-11-06 RX ORDER — POTASSIUM CHLORIDE 20 MEQ/1
20 TABLET, EXTENDED RELEASE ORAL DAILY
Qty: 60 TABLET | Refills: 3 | Status: SHIPPED | OUTPATIENT
Start: 2018-11-06 | End: 2018-12-05 | Stop reason: ALTCHOICE

## 2018-11-06 RX ORDER — FUROSEMIDE 40 MG/1
40 TABLET ORAL DAILY
Qty: 15 TABLET | Refills: 0 | Status: SHIPPED | OUTPATIENT
Start: 2018-11-06 | End: 2018-12-05 | Stop reason: ALTCHOICE

## 2018-11-06 NOTE — ASSESSMENT & PLAN NOTE
HE gets tored on minimal exertion and he is not sleepin well. His mother passed away 6 months ago. He is under  A lot of stress .  Also place holter to make sure he is not too bradycardic

## 2018-11-06 NOTE — PROGRESS NOTES
- avg rate of 69bpm. Lowest rate 46 bpm at 0622. Frequent isolated 5954 PVCs noted mostly in bigemenal pattern w/out complex arrhythmias. Five beat run of atrial tachycardia at 1228. No palpitations or dizziness reported in patient's daily activity report. (12-, )    H/O cardiac catheterization 06-    Noncritical diffuse CAD w/no critical stenosis. Diag borderline significant stenosis, not large enough fo percutaneous intervention. No significant angiographic progression of atherosclerosis since cath in 1997.   ( per Dr. Patricia Gao at [facility]. Preserved vent function. MVP. CAD w/normal LM, 30-40% stenosis LAD, 70-80% stenosis of ostium & prox seg diag branch, normal left CX & RCA.)    H/O cardiovascular stress test 1/30/2013, 06- 1/30/2013-Normal study. Normal perfusion in all regions. EF 62%. 06--EF=60%. Normal. Normal pattern of perfusion. LV normal size. No wall abnormality. Exercise capacity good. (05-, 12-, 06-, , )    H/O chest pain     H/O chest x-ray 06-    Negative. Dx was dyspnea and CAD.  (02-)    H/O dizziness     H/O Doppler ultrasound 11-    (Carotid) Intimal thickening but no significant atherosclerotic plaque noted in right or left ICA. Doppler flow velocities w/in right and left ICA WNL.  H/O echocardiogram 1/30/2013, 05-    1/30/2013- LVSF normal. EF 50-55%. Impaired LV relaxation. Trace MR. Trace TR;   5- Normal LV size and systolic function. EF=60%. Chamber dimensions WNL. Mild concentric LV hypertrophy.  Valves appear structurally normal.-OICC       H/O gastroesophageal reflux (GERD)     H/O pneumothorax     History of complete ECG 06-    (06-, 07-, 06-, 06-)    Hyperlipidemia     Hypertension     MVP (mitral valve prolapse)      Past Surgical History:   Procedure Laterality Date    CHOLECYSTECTOMY  2002   Shayla Cagle crackles  Cardiovascular: (PMI) apex non displaced,no lifts no thrills,S1 and S2 audible, No added heart sounds, No signs of ankle edema, or volume overload, No evidence of JVD, No crackles  GI:  Bowel sounds normal, Soft, No tenderness, No masses, No gross visceromegaly   :  No costovertebral angle tenderness   Musculoskeletal:  No edema, no tenderness, no deformities. Back- no tenderness  Integument:  Well hydrated, no rash   Lymphatic:  No lymphadenopathy noted   Neurologic:  Alert & oriented x 3, CN 2-12 normal, normal motor function, normal sensory function, no focal deficits noted   Psychiatric:  Speech and behavior appropriate       Medical decision making and Data review:  DATA:  No results found for: TROPONINT  BNP:  No results found for: PROBNP  PT/INR:  No results found for: PTINR  Lab Results   Component Value Date    LABA1C 7.1 11/02/2018    LABA1C 7.2 08/02/2018     Lab Results   Component Value Date    CHOL 117 11/02/2018    TRIG 178 (H) 11/02/2018    HDL 43 11/02/2018    LDLCALC 54 03/12/2013     Lab Results   Component Value Date    ALT 23 11/02/2018    AST 24 11/02/2018     TSH:   Lab Results   Component Value Date    TSH 2.370 11/02/2018     Lab Results   Component Value Date    AST 24 11/02/2018    ALT 23 11/02/2018    BILITOT 1.0 11/02/2018    ALKPHOS 50 11/02/2018     No results found for: PROBNP  Lab Results   Component Value Date    LABA1C 7.1 11/02/2018    LABA1C 7.2 08/02/2018     Lab Results   Component Value Date    WBC 10.6 (H) 06/15/2015    HGB 14.4 11/02/2018    HCT 43.3 11/02/2018     11/02/2018     All labs, medications and tests reviewed by myself including data and history from outside source , patient and available family . Assessment & Plan:      1. SOB (shortness of breath)    2. MVP (mitral valve prolapse)    3. Acquired hypothyroidism    4. Chronic fatigue    5. Chest pain, unspecified type    6.  ASCVD (arteriosclerotic cardiovascular disease)         Fatigue  HE

## 2018-11-07 ENCOUNTER — PROCEDURE VISIT (OUTPATIENT)
Dept: CARDIOLOGY CLINIC | Age: 78
End: 2018-11-07
Payer: MEDICARE

## 2018-11-07 ENCOUNTER — HOSPITAL ENCOUNTER (OUTPATIENT)
Age: 78
Discharge: HOME | End: 2018-11-07
Payer: MEDICARE

## 2018-11-07 ENCOUNTER — TELEPHONE (OUTPATIENT)
Dept: CARDIOLOGY CLINIC | Age: 78
End: 2018-11-07

## 2018-11-07 ENCOUNTER — NURSE ONLY (OUTPATIENT)
Dept: CARDIOLOGY CLINIC | Age: 78
End: 2018-11-07
Payer: MEDICARE

## 2018-11-07 DIAGNOSIS — I34.1 MVP (MITRAL VALVE PROLAPSE): ICD-10-CM

## 2018-11-07 DIAGNOSIS — I25.10 ASCVD (ARTERIOSCLEROTIC CARDIOVASCULAR DISEASE): ICD-10-CM

## 2018-11-07 DIAGNOSIS — E03.9 ACQUIRED HYPOTHYROIDISM: ICD-10-CM

## 2018-11-07 DIAGNOSIS — R07.9 CHEST PAIN, UNSPECIFIED TYPE: ICD-10-CM

## 2018-11-07 DIAGNOSIS — R06.02 SOB (SHORTNESS OF BREATH): ICD-10-CM

## 2018-11-07 DIAGNOSIS — R53.82 CHRONIC FATIGUE: ICD-10-CM

## 2018-11-07 DIAGNOSIS — R00.2 PALPITATIONS: Primary | ICD-10-CM

## 2018-11-07 LAB
LV EF: 61 %
LVEF MODALITY: NORMAL
PRO-BNP: 110 PG/ML

## 2018-11-07 PROCEDURE — 83880 ASSAY OF NATRIURETIC PEPTIDE: CPT

## 2018-11-07 PROCEDURE — 93225 XTRNL ECG REC<48 HRS REC: CPT | Performed by: INTERNAL MEDICINE

## 2018-11-07 PROCEDURE — 36415 COLL VENOUS BLD VENIPUNCTURE: CPT

## 2018-11-07 PROCEDURE — 93015 CV STRESS TEST SUPVJ I&R: CPT | Performed by: INTERNAL MEDICINE

## 2018-11-07 PROCEDURE — A9500 TC99M SESTAMIBI: HCPCS | Performed by: INTERNAL MEDICINE

## 2018-11-07 PROCEDURE — 78452 HT MUSCLE IMAGE SPECT MULT: CPT | Performed by: INTERNAL MEDICINE

## 2018-11-07 NOTE — TELEPHONE ENCOUNTER
Notified pt of normal BNP and that  called in 2 new med's(Lasix& K-dur)for him to take to see if this helps his SOB. . Pt and his wife voiced understanding they will pick them up at the Pharmacy. I will check on him next week to see if new meds help him.

## 2018-11-08 ENCOUNTER — TELEPHONE (OUTPATIENT)
Dept: CARDIOLOGY CLINIC | Age: 78
End: 2018-11-08

## 2018-11-12 ENCOUNTER — HOSPITAL ENCOUNTER (OUTPATIENT)
Dept: CT IMAGING | Age: 78
Discharge: HOME | End: 2018-11-12
Payer: MEDICARE

## 2018-11-12 DIAGNOSIS — R07.9 CHEST PAIN, UNSPECIFIED TYPE: ICD-10-CM

## 2018-11-12 DIAGNOSIS — R06.02 SOB (SHORTNESS OF BREATH): ICD-10-CM

## 2018-11-12 DIAGNOSIS — I34.1 MVP (MITRAL VALVE PROLAPSE): ICD-10-CM

## 2018-11-12 DIAGNOSIS — E03.9 ACQUIRED HYPOTHYROIDISM: ICD-10-CM

## 2018-11-12 DIAGNOSIS — R53.82 CHRONIC FATIGUE: ICD-10-CM

## 2018-11-12 DIAGNOSIS — I25.10 ASCVD (ARTERIOSCLEROTIC CARDIOVASCULAR DISEASE): ICD-10-CM

## 2018-11-12 PROCEDURE — 71250 CT THORAX DX C-: CPT

## 2018-11-13 ENCOUNTER — TELEPHONE (OUTPATIENT)
Dept: CARDIOLOGY CLINIC | Age: 78
End: 2018-11-13

## 2018-11-13 DIAGNOSIS — R91.1 LUNG NODULE: Primary | ICD-10-CM

## 2018-11-19 ENCOUNTER — INITIAL CONSULT (OUTPATIENT)
Dept: PULMONOLOGY | Age: 78
End: 2018-11-19
Payer: MEDICARE

## 2018-11-19 VITALS
SYSTOLIC BLOOD PRESSURE: 110 MMHG | HEART RATE: 76 BPM | DIASTOLIC BLOOD PRESSURE: 60 MMHG | BODY MASS INDEX: 27.98 KG/M2 | WEIGHT: 206.6 LBS | OXYGEN SATURATION: 97 % | HEIGHT: 72 IN

## 2018-11-19 DIAGNOSIS — G47.19 EXCESSIVE DAYTIME SLEEPINESS: ICD-10-CM

## 2018-11-19 DIAGNOSIS — R06.02 SOB (SHORTNESS OF BREATH) ON EXERTION: ICD-10-CM

## 2018-11-19 DIAGNOSIS — G47.33 OSA (OBSTRUCTIVE SLEEP APNEA): ICD-10-CM

## 2018-11-19 DIAGNOSIS — J84.9 INTERSTITIAL LUNG DISEASE (HCC): ICD-10-CM

## 2018-11-19 DIAGNOSIS — R91.8 ENDOBRONCHIAL MASS: ICD-10-CM

## 2018-11-19 DIAGNOSIS — E66.3 OVERWEIGHT (BMI 25.0-29.9): ICD-10-CM

## 2018-11-19 PROCEDURE — G8427 DOCREV CUR MEDS BY ELIG CLIN: HCPCS | Performed by: INTERNAL MEDICINE

## 2018-11-19 PROCEDURE — 99204 OFFICE O/P NEW MOD 45 MIN: CPT | Performed by: INTERNAL MEDICINE

## 2018-11-19 PROCEDURE — 1101F PT FALLS ASSESS-DOCD LE1/YR: CPT | Performed by: INTERNAL MEDICINE

## 2018-11-19 PROCEDURE — 4040F PNEUMOC VAC/ADMIN/RCVD: CPT | Performed by: INTERNAL MEDICINE

## 2018-11-19 PROCEDURE — G8419 CALC BMI OUT NRM PARAM NOF/U: HCPCS | Performed by: INTERNAL MEDICINE

## 2018-11-19 PROCEDURE — 1036F TOBACCO NON-USER: CPT | Performed by: INTERNAL MEDICINE

## 2018-11-19 PROCEDURE — G8599 NO ASA/ANTIPLAT THER USE RNG: HCPCS | Performed by: INTERNAL MEDICINE

## 2018-11-19 PROCEDURE — G8482 FLU IMMUNIZE ORDER/ADMIN: HCPCS | Performed by: INTERNAL MEDICINE

## 2018-11-19 PROCEDURE — 1123F ACP DISCUSS/DSCN MKR DOCD: CPT | Performed by: INTERNAL MEDICINE

## 2018-11-19 RX ORDER — PREDNISONE 1 MG/1
10 TABLET ORAL DAILY
Qty: 20 TABLET | Refills: 0 | Status: SHIPPED | OUTPATIENT
Start: 2018-11-19 | End: 2018-11-29

## 2018-11-19 ASSESSMENT — ENCOUNTER SYMPTOMS
ABDOMINAL PAIN: 0
EYE ITCHING: 0
EYE DISCHARGE: 0
SHORTNESS OF BREATH: 1
ABDOMINAL DISTENTION: 0
COUGH: 1
BACK PAIN: 0

## 2018-11-19 NOTE — PROGRESS NOTES
Leopold Reinaace  1940  Referring Provider: Dr. Amalia Wing    Subjective:     Chief Complaint   Patient presents with    Other     lung nodule       HPI  Dariusz Kuhn is a 69 y.o. male has been referred for the 3 mm BERE endobronchial nodule. He has no h/o smoking but possible second hand smoking exposure. He worked in [employer] for 30 years. He has cough, phlegm- clear, no hemoptysis, no loss of weight, good appetite, SOB when he walks for 1 block which is getting worse for the last 3 months. He is not on any inhalers. He had his flu vaccine and pneumovax. His CT chest done on 11/12/18 showed that he has a 3 mm BERE endobronchial nodule and Basal fibrosis. He has some basal peripheral honey combing ??. He has dog at home and no exposure to chemicals or dust. He has no hot tub inside home. 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 He wakes up many times in the night. He woke up choking and gasping off and on, woke up with dry mouth, no palpitations. He gets up at 6 to 8 am after gpoing to bed at 11 PM and he is tired. He has no morning headaches. He is sleepy during the day time.      Current Outpatient Prescriptions   Medication Sig Dispense Refill    predniSONE (DELTASONE) 5 MG tablet Take 2 tablets by mouth daily for 10 days Take 4 tabs x 2 days, 3 tabs x 2 days, 2 tabs x 2 days, 1 tab x 2 days 20 tablet 0    furosemide (LASIX) 40 MG tablet Take 1 tablet by mouth daily 15 tablet 0    potassium chloride (KLOR-CON M) 20 MEQ extended release tablet Take 1 tablet by mouth daily 60 tablet 3    donepezil (ARICEPT) 5 MG tablet Take 1 tablet by mouth nightly 90 tablet 1    sertraline (ZOLOFT) 50 MG tablet Take 1 tablet by mouth daily 90 tablet 1    esomeprazole (NEXIUM) 40 MG delayed release capsule Take 1 capsule by mouth every morning (before breakfast) 90 capsule 1    atorvastatin (LIPITOR) 20 MG tablet Take 1 tablet by mouth daily 90 tablet 1    SITagliptin (JANUVIA) 100

## 2018-11-20 ENCOUNTER — PROCEDURE VISIT (OUTPATIENT)
Dept: CARDIOLOGY CLINIC | Age: 78
End: 2018-11-20
Payer: MEDICARE

## 2018-11-20 ENCOUNTER — HOSPITAL ENCOUNTER (OUTPATIENT)
Age: 78
Discharge: HOME | End: 2018-11-20
Payer: MEDICARE

## 2018-11-20 DIAGNOSIS — E03.9 ACQUIRED HYPOTHYROIDISM: ICD-10-CM

## 2018-11-20 DIAGNOSIS — R06.02 SOB (SHORTNESS OF BREATH): Primary | ICD-10-CM

## 2018-11-20 DIAGNOSIS — I34.1 MVP (MITRAL VALVE PROLAPSE): ICD-10-CM

## 2018-11-20 DIAGNOSIS — I25.10 ASCVD (ARTERIOSCLEROTIC CARDIOVASCULAR DISEASE): ICD-10-CM

## 2018-11-20 DIAGNOSIS — R07.9 CHEST PAIN, UNSPECIFIED TYPE: ICD-10-CM

## 2018-11-20 LAB
LV EF: 58 %
LVEF MODALITY: NORMAL

## 2018-11-20 PROCEDURE — 93306 TTE W/DOPPLER COMPLETE: CPT | Performed by: INTERNAL MEDICINE

## 2018-11-20 PROCEDURE — 86430 RHEUMATOID FACTOR TEST QUAL: CPT

## 2018-11-20 PROCEDURE — 86160 COMPLEMENT ANTIGEN: CPT

## 2018-11-20 PROCEDURE — 83516 IMMUNOASSAY NONANTIBODY: CPT

## 2018-11-20 PROCEDURE — 86255 FLUORESCENT ANTIBODY SCREEN: CPT

## 2018-11-20 PROCEDURE — 86038 ANTINUCLEAR ANTIBODIES: CPT

## 2018-11-20 PROCEDURE — 36415 COLL VENOUS BLD VENIPUNCTURE: CPT

## 2018-11-23 LAB
ANTI-NUCLEAR ANTIBODY (ANA): NORMAL
COMPLEMENT C3: 176 MG/DL
COMPLEMENT C4: 33 MG/DL
RHEUMATOID FACTOR: 15

## 2018-11-24 LAB
MYELOPEROXIDASE AB: 0
NEUTROPHIL CYTOPLASMIC AB IGG: NORMAL
SERINE PR3 (ANCA): 3

## 2018-11-26 ENCOUNTER — TELEPHONE (OUTPATIENT)
Dept: CARDIOLOGY CLINIC | Age: 78
End: 2018-11-26

## 2018-12-03 ENCOUNTER — ANESTHESIA EVENT (OUTPATIENT)
Dept: ENDOSCOPY | Age: 78
End: 2018-12-03
Payer: MEDICARE

## 2018-12-03 PROCEDURE — 93227 XTRNL ECG REC<48 HR R&I: CPT | Performed by: INTERNAL MEDICINE

## 2018-12-03 ASSESSMENT — ENCOUNTER SYMPTOMS: SHORTNESS OF BREATH: 1

## 2018-12-03 NOTE — ANESTHESIA PRE PROCEDURE
w/no critical stenosis. Diag borderline significant stenosis, not large enough fo percutaneous intervention. No significant angiographic progression of atherosclerosis since cath in 1997.   ( per Dr. Rivas Willett at [facility]. Preserved vent function. MVP. CAD w/normal LM, 30-40% stenosis LAD, 70-80% stenosis of ostium & prox seg diag branch, normal left CX & RCA.)    H/O cardiovascular stress test 1/30/2013, 06- 1/30/2013-Normal study. Normal perfusion in all regions. EF 62%. 06--EF=60%. Normal. Normal pattern of perfusion. LV normal size. No wall abnormality. Exercise capacity good. (05-, 12-, 06-, , )    H/O chest pain     H/O chest x-ray 06-    Negative. Dx was dyspnea and CAD.  (02-)    H/O dizziness     H/O Doppler ultrasound 11-    (Carotid) Intimal thickening but no significant atherosclerotic plaque noted in right or left ICA. Doppler flow velocities w/in right and left ICA WNL.  H/O echocardiogram 1/30/2013, 05-    1/30/2013- LVSF normal. EF 50-55%. Impaired LV relaxation. Trace MR. Trace TR;   5- Normal LV size and systolic function. EF=60%. Chamber dimensions WNL. Mild concentric LV hypertrophy.  Valves appear structurally normal.-OICC       H/O gastroesophageal reflux (GERD)     H/O pneumothorax     History of complete ECG 06-    (06-, 07-, 06-, 06-)    History of stress test 11/07/2018    EF 61%, Normal    Hx of Doppler echocardiogram 11/20/2018    EF55-60%,no valvular disease    Hyperlipidemia     Hypertension     MVP (mitral valve prolapse)        Past Surgical History:        Procedure Laterality Date    CHOLECYSTECTOMY  2002   Danny Plants      Dr. Jeferson Merchant       Social History:    Social History   Substance Use Topics    Smoking status: Never Smoker    Smokeless tobacco: Never Used      Comment: Caffeine - 2 cups coffee/day- reviewed 2/6/13    Alcohol use No

## 2018-12-04 ENCOUNTER — ANESTHESIA (OUTPATIENT)
Dept: ENDOSCOPY | Age: 78
End: 2018-12-04
Payer: MEDICARE

## 2018-12-05 ENCOUNTER — OFFICE VISIT (OUTPATIENT)
Dept: CARDIOLOGY CLINIC | Age: 78
End: 2018-12-05
Payer: MEDICARE

## 2018-12-05 VITALS
DIASTOLIC BLOOD PRESSURE: 60 MMHG | SYSTOLIC BLOOD PRESSURE: 116 MMHG | HEART RATE: 78 BPM | WEIGHT: 213.2 LBS | BODY MASS INDEX: 28.88 KG/M2 | HEIGHT: 72 IN

## 2018-12-05 DIAGNOSIS — G47.33 OSA (OBSTRUCTIVE SLEEP APNEA): ICD-10-CM

## 2018-12-05 DIAGNOSIS — R07.9 CHEST PAIN, UNSPECIFIED TYPE: Primary | ICD-10-CM

## 2018-12-05 DIAGNOSIS — E11.9 TYPE 2 DIABETES MELLITUS WITHOUT COMPLICATION, WITHOUT LONG-TERM CURRENT USE OF INSULIN (HCC): ICD-10-CM

## 2018-12-05 DIAGNOSIS — R91.8 ENDOBRONCHIAL MASS: ICD-10-CM

## 2018-12-05 DIAGNOSIS — I25.10 ASCVD (ARTERIOSCLEROTIC CARDIOVASCULAR DISEASE): ICD-10-CM

## 2018-12-05 DIAGNOSIS — E66.3 OVERWEIGHT (BMI 25.0-29.9): ICD-10-CM

## 2018-12-05 DIAGNOSIS — J84.9 INTERSTITIAL LUNG DISEASE (HCC): ICD-10-CM

## 2018-12-05 DIAGNOSIS — R06.02 SOB (SHORTNESS OF BREATH) ON EXERTION: ICD-10-CM

## 2018-12-05 PROCEDURE — 1036F TOBACCO NON-USER: CPT | Performed by: INTERNAL MEDICINE

## 2018-12-05 PROCEDURE — 1101F PT FALLS ASSESS-DOCD LE1/YR: CPT | Performed by: INTERNAL MEDICINE

## 2018-12-05 PROCEDURE — G8419 CALC BMI OUT NRM PARAM NOF/U: HCPCS | Performed by: INTERNAL MEDICINE

## 2018-12-05 PROCEDURE — 4040F PNEUMOC VAC/ADMIN/RCVD: CPT | Performed by: INTERNAL MEDICINE

## 2018-12-05 PROCEDURE — G8599 NO ASA/ANTIPLAT THER USE RNG: HCPCS | Performed by: INTERNAL MEDICINE

## 2018-12-05 PROCEDURE — G8427 DOCREV CUR MEDS BY ELIG CLIN: HCPCS | Performed by: INTERNAL MEDICINE

## 2018-12-05 PROCEDURE — 99214 OFFICE O/P EST MOD 30 MIN: CPT | Performed by: INTERNAL MEDICINE

## 2018-12-05 PROCEDURE — 1123F ACP DISCUSS/DSCN MKR DOCD: CPT | Performed by: INTERNAL MEDICINE

## 2018-12-05 PROCEDURE — G8482 FLU IMMUNIZE ORDER/ADMIN: HCPCS | Performed by: INTERNAL MEDICINE

## 2018-12-05 NOTE — PROGRESS NOTES
stress test is negative for ischemia by diagnostic criteria.    Normal EF 61 % with normal ventricular contractility.    No infarct or ischemia noted.    Normal stress myocardial perfusion.    Normal study     holter 12/3/18  9 beat run of SVT , no other arrhythmias noted , No diary provided, primarily sinus rythym  Clinical correlation needed        All labs, medications and tests reviewed by myself including data and history from outside source , patient and available family . Assessment & Plan:      1. Chest pain, unspecified type    2. Type 2 diabetes mellitus without complication, without long-term current use of insulin ([address]    3. ASCVD (arteriosclerotic cardiovascular disease)    4. Overweight (BMI 25.0-29.9)    5. JOSE CRUZ (obstructive sleep apnea)    6. SOB (shortness of breath) on exertion    7. Interstitial lung disease ([address]    8. Endobronchial mass         Fatigue  HE gets tored on minimal exertion and he is not sleepin well. His mother passed away 6 months ago. He is under  A lot of stress   He is not sleeping well. SOB  I think it is primarily due to lung process    Chest pain  He is mainly exhausted on minimal exertion. He has had some tingling in chest associated with it . HE had cardiac cath many years ago showing disease. Stress test is normal , He had SVT on holter which could be due to lung , may need cardizem if he gets more SVt especially with lung issues    ASCVD (arteriosclerotic cardiovascular disease)  As per cath many years ago but stress test was normal. He is on aspirin and statins. Dyslipidemia :  All available lab work was reviewed. Patient was advised to repeat lab work before next visit      Counseled extensively and medication compliance urged. We discussed that for the  prevention of ASCVD our  goal is aggressive risk modification. Patient is encouraged to exercise even a brisk walk for 30 minutes  at least 3 to 4 times a week   Various goals were discussed and questions

## 2018-12-07 ENCOUNTER — APPOINTMENT (OUTPATIENT)
Dept: GENERAL RADIOLOGY | Age: 78
End: 2018-12-07
Attending: INTERNAL MEDICINE
Payer: MEDICARE

## 2018-12-07 ENCOUNTER — HOSPITAL ENCOUNTER (OUTPATIENT)
Age: 78
Setting detail: OUTPATIENT SURGERY
Discharge: HOME | End: 2018-12-07
Attending: INTERNAL MEDICINE | Admitting: INTERNAL MEDICINE
Payer: MEDICARE

## 2018-12-07 VITALS
WEIGHT: 203 LBS | BODY MASS INDEX: 27.5 KG/M2 | HEIGHT: 72 IN | SYSTOLIC BLOOD PRESSURE: 120 MMHG | RESPIRATION RATE: 16 BRPM | TEMPERATURE: 97.5 F | HEART RATE: 56 BPM | OXYGEN SATURATION: 96 % | DIASTOLIC BLOOD PRESSURE: 65 MMHG

## 2018-12-07 VITALS
OXYGEN SATURATION: 97 % | DIASTOLIC BLOOD PRESSURE: 70 MMHG | RESPIRATION RATE: 14 BRPM | SYSTOLIC BLOOD PRESSURE: 128 MMHG

## 2018-12-07 LAB — GLUCOSE BLD-MCNC: 133 MG/DL (ref 70–99)

## 2018-12-07 PROCEDURE — 2500000003 HC RX 250 WO HCPCS: Performed by: NURSE ANESTHETIST, CERTIFIED REGISTERED

## 2018-12-07 PROCEDURE — 82962 GLUCOSE BLOOD TEST: CPT

## 2018-12-07 PROCEDURE — 7100000000 HC PACU RECOVERY - FIRST 15 MIN

## 2018-12-07 PROCEDURE — 87116 MYCOBACTERIA CULTURE: CPT

## 2018-12-07 PROCEDURE — 87205 SMEAR GRAM STAIN: CPT

## 2018-12-07 PROCEDURE — 3609011800 HC BRONCHOSCOPY/TRANSBRONCHIAL LUNG BIOPSY: Performed by: INTERNAL MEDICINE

## 2018-12-07 PROCEDURE — 31623 DX BRONCHOSCOPE/BRUSH: CPT | Performed by: INTERNAL MEDICINE

## 2018-12-07 PROCEDURE — 31623 DX BRONCHOSCOPE/BRUSH: CPT

## 2018-12-07 PROCEDURE — 71045 X-RAY EXAM CHEST 1 VIEW: CPT

## 2018-12-07 PROCEDURE — 2500000003 HC RX 250 WO HCPCS: Performed by: INTERNAL MEDICINE

## 2018-12-07 PROCEDURE — 31625 BRONCHOSCOPY W/BIOPSY(S): CPT | Performed by: INTERNAL MEDICINE

## 2018-12-07 PROCEDURE — 88112 CYTOPATH CELL ENHANCE TECH: CPT

## 2018-12-07 PROCEDURE — 6360000002 HC RX W HCPCS: Performed by: INTERNAL MEDICINE

## 2018-12-07 PROCEDURE — 88305 TISSUE EXAM BY PATHOLOGIST: CPT

## 2018-12-07 PROCEDURE — 7100000001 HC PACU RECOVERY - ADDTL 15 MIN

## 2018-12-07 PROCEDURE — 88341 IMHCHEM/IMCYTCHM EA ADD ANTB: CPT

## 2018-12-07 PROCEDURE — 31645 BRNCHSC W/THER ASPIR 1ST: CPT

## 2018-12-07 PROCEDURE — 3700000000 HC ANESTHESIA ATTENDED CARE: Performed by: INTERNAL MEDICINE

## 2018-12-07 PROCEDURE — 87102 FUNGUS ISOLATION CULTURE: CPT

## 2018-12-07 PROCEDURE — 3700000001 HC ADD 15 MINUTES (ANESTHESIA): Performed by: INTERNAL MEDICINE

## 2018-12-07 PROCEDURE — 3619154100 HC RT BRONCHOSCOPY

## 2018-12-07 PROCEDURE — 87070 CULTURE OTHR SPECIMN AEROBIC: CPT

## 2018-12-07 PROCEDURE — 2709999900 HC NON-CHARGEABLE SUPPLY: Performed by: INTERNAL MEDICINE

## 2018-12-07 PROCEDURE — 6360000002 HC RX W HCPCS

## 2018-12-07 PROCEDURE — 88342 IMHCHEM/IMCYTCHM 1ST ANTB: CPT

## 2018-12-07 PROCEDURE — 3619010800 HC RT BRONCHOSCOPY ALVEOLAR LAVAGE

## 2018-12-07 PROCEDURE — 2580000003 HC RX 258: Performed by: ANESTHESIOLOGY

## 2018-12-07 PROCEDURE — 6360000002 HC RX W HCPCS: Performed by: NURSE ANESTHETIST, CERTIFIED REGISTERED

## 2018-12-07 PROCEDURE — 31625 BRONCHOSCOPY W/BIOPSY(S): CPT

## 2018-12-07 PROCEDURE — 7100000011 HC PHASE II RECOVERY - ADDTL 15 MIN: Performed by: INTERNAL MEDICINE

## 2018-12-07 PROCEDURE — 7100000010 HC PHASE II RECOVERY - FIRST 15 MIN: Performed by: INTERNAL MEDICINE

## 2018-12-07 RX ORDER — LIDOCAINE HYDROCHLORIDE 20 MG/ML
INJECTION, SOLUTION INFILTRATION; PERINEURAL PRN
Status: DISCONTINUED | OUTPATIENT
Start: 2018-12-07 | End: 2018-12-07 | Stop reason: SDUPTHER

## 2018-12-07 RX ORDER — SODIUM CHLORIDE 9 MG/ML
INJECTION, SOLUTION INTRAVENOUS CONTINUOUS
Status: DISCONTINUED | OUTPATIENT
Start: 2018-12-07 | End: 2018-12-07 | Stop reason: HOSPADM

## 2018-12-07 RX ORDER — PROPOFOL 10 MG/ML
INJECTION, EMULSION INTRAVENOUS PRN
Status: DISCONTINUED | OUTPATIENT
Start: 2018-12-07 | End: 2018-12-07 | Stop reason: SDUPTHER

## 2018-12-07 RX ORDER — LIDOCAINE HYDROCHLORIDE 10 MG/ML
INJECTION, SOLUTION EPIDURAL; INFILTRATION; INTRACAUDAL; PERINEURAL PRN
Status: DISCONTINUED | OUTPATIENT
Start: 2018-12-07 | End: 2018-12-07 | Stop reason: HOSPADM

## 2018-12-07 RX ADMIN — SODIUM CHLORIDE: 9 INJECTION, SOLUTION INTRAVENOUS at 07:48

## 2018-12-07 RX ADMIN — PROPOFOL 20 MG: 10 INJECTION, EMULSION INTRAVENOUS at 09:36

## 2018-12-07 RX ADMIN — PROPOFOL 30 MG: 10 INJECTION, EMULSION INTRAVENOUS at 09:48

## 2018-12-07 RX ADMIN — PROPOFOL 30 MG: 10 INJECTION, EMULSION INTRAVENOUS at 09:44

## 2018-12-07 RX ADMIN — LIDOCAINE HYDROCHLORIDE 60 MG: 20 INJECTION, SOLUTION INFILTRATION; PERINEURAL at 09:32

## 2018-12-07 RX ADMIN — PROPOFOL 30 MG: 10 INJECTION, EMULSION INTRAVENOUS at 09:51

## 2018-12-07 RX ADMIN — PROPOFOL 80 MG: 10 INJECTION, EMULSION INTRAVENOUS at 09:32

## 2018-12-07 RX ADMIN — PROPOFOL 30 MG: 10 INJECTION, EMULSION INTRAVENOUS at 09:46

## 2018-12-07 RX ADMIN — PROPOFOL 30 MG: 10 INJECTION, EMULSION INTRAVENOUS at 09:40

## 2018-12-07 ASSESSMENT — PULMONARY FUNCTION TESTS
PIF_VALUE: 0
PIF_VALUE: 1
PIF_VALUE: 0
PIF_VALUE: 1
PIF_VALUE: 0

## 2018-12-07 ASSESSMENT — PAIN - FUNCTIONAL ASSESSMENT: PAIN_FUNCTIONAL_ASSESSMENT: 0-10

## 2018-12-09 LAB
CULTURE: NORMAL
GRAM SMEAR: NORMAL
Lab: NORMAL
REPORT STATUS: NORMAL
SPECIMEN: NORMAL

## 2018-12-10 ENCOUNTER — HOSPITAL ENCOUNTER (OUTPATIENT)
Dept: PULMONOLOGY | Age: 78
Discharge: HOME | End: 2018-12-10
Payer: MEDICARE

## 2018-12-10 DIAGNOSIS — R06.02 SOB (SHORTNESS OF BREATH) ON EXERTION: ICD-10-CM

## 2018-12-10 DIAGNOSIS — J84.9 INTERSTITIAL LUNG DISEASE (HCC): ICD-10-CM

## 2018-12-10 LAB
DLCO %PRED: 51 %
DLCO PRED: NORMAL ML/MIN/MMHG
DLCO/VA %PRED: NORMAL %
DLCO/VA PRED: NORMAL ML/MIN/MMHG
DLCO/VA: NORMAL ML/MIN/MMHG
DLCO: NORMAL ML/MIN/MMHG
EXPIRATORY TIME: NORMAL SEC
FEF 25-75% %PRED-PRE: NORMAL L/SEC
FEF 25-75% PRED: NORMAL L/SEC
FEF 25-75%-PRE: NORMAL L/SEC
FEV1 %PRED-PRE: 80 %
FEV1 PRED: NORMAL L
FEV1/FVC %PRED-PRE: NORMAL %
FEV1/FVC PRED: NORMAL %
FEV1/FVC: 85 %
FEV1: NORMAL L
FVC %PRED-PRE: NORMAL %
FVC PRED: NORMAL L
FVC: NORMAL L
GAW %PRED: NORMAL %
GAW PRED: NORMAL L/S/CMH2O
GAW: NORMAL L/S/CMH2O
IC %PRED: NORMAL %
IC PRED: NORMAL L
IC: NORMAL L
MVV %PRED-PRE: NORMAL %
MVV PRED: NORMAL L/MIN
MVV-PRE: NORMAL L/MIN
PEF %PRED-PRE: NORMAL L/SEC
PEF PRED: NORMAL L/SEC
PEF-PRE: NORMAL L/SEC
RAW %PRED: NORMAL %
RAW PRED: NORMAL CMH2O/L/S
RAW: NORMAL CMH2O/L/S
RV %PRED: NORMAL %
RV PRED: NORMAL L
RV: NORMAL L
SVC %PRED: NORMAL %
SVC PRED: NORMAL L
SVC: NORMAL L
TLC %PRED: 58 %
TLC PRED: NORMAL L
TLC: NORMAL L
VA %PRED: NORMAL %
VA PRED: NORMAL L
VA: NORMAL L
VTG %PRED: NORMAL %
VTG PRED: NORMAL L
VTG: NORMAL L

## 2018-12-10 PROCEDURE — 94726 PLETHYSMOGRAPHY LUNG VOLUMES: CPT

## 2018-12-10 PROCEDURE — 94060 EVALUATION OF WHEEZING: CPT

## 2018-12-10 PROCEDURE — 94618 PULMONARY STRESS TESTING: CPT

## 2018-12-10 PROCEDURE — 94729 DIFFUSING CAPACITY: CPT

## 2018-12-10 ASSESSMENT — PULMONARY FUNCTION TESTS
FEV1/FVC: 85
FEV1_PERCENT_PREDICTED_PRE: 80

## 2018-12-11 ENCOUNTER — TELEPHONE (OUTPATIENT)
Dept: CARDIOLOGY CLINIC | Age: 78
End: 2018-12-11

## 2018-12-17 ENCOUNTER — OFFICE VISIT (OUTPATIENT)
Dept: PULMONOLOGY | Age: 78
End: 2018-12-17
Payer: MEDICARE

## 2018-12-17 VITALS
DIASTOLIC BLOOD PRESSURE: 70 MMHG | OXYGEN SATURATION: 96 % | HEIGHT: 72 IN | RESPIRATION RATE: 16 BRPM | BODY MASS INDEX: 28.85 KG/M2 | HEART RATE: 76 BPM | SYSTOLIC BLOOD PRESSURE: 118 MMHG | WEIGHT: 213 LBS

## 2018-12-17 DIAGNOSIS — J84.9 INTERSTITIAL LUNG DISEASE (HCC): ICD-10-CM

## 2018-12-17 DIAGNOSIS — D3A.090 CARCINOID TUMOR DETERMINED BY BIOPSY OF LUNG: Primary | ICD-10-CM

## 2018-12-17 DIAGNOSIS — R91.8 ENDOBRONCHIAL MASS: ICD-10-CM

## 2018-12-17 DIAGNOSIS — E66.3 OVERWEIGHT (BMI 25.0-29.9): ICD-10-CM

## 2018-12-17 DIAGNOSIS — R06.02 SOB (SHORTNESS OF BREATH) ON EXERTION: ICD-10-CM

## 2018-12-17 DIAGNOSIS — G47.33 OSA (OBSTRUCTIVE SLEEP APNEA): ICD-10-CM

## 2018-12-17 DIAGNOSIS — G47.19 EXCESSIVE DAYTIME SLEEPINESS: ICD-10-CM

## 2018-12-17 PROCEDURE — 1123F ACP DISCUSS/DSCN MKR DOCD: CPT | Performed by: INTERNAL MEDICINE

## 2018-12-17 PROCEDURE — 4040F PNEUMOC VAC/ADMIN/RCVD: CPT | Performed by: INTERNAL MEDICINE

## 2018-12-17 PROCEDURE — 1101F PT FALLS ASSESS-DOCD LE1/YR: CPT | Performed by: INTERNAL MEDICINE

## 2018-12-17 PROCEDURE — G8419 CALC BMI OUT NRM PARAM NOF/U: HCPCS | Performed by: INTERNAL MEDICINE

## 2018-12-17 PROCEDURE — G8482 FLU IMMUNIZE ORDER/ADMIN: HCPCS | Performed by: INTERNAL MEDICINE

## 2018-12-17 PROCEDURE — G8598 ASA/ANTIPLAT THER USED: HCPCS | Performed by: INTERNAL MEDICINE

## 2018-12-17 PROCEDURE — 1036F TOBACCO NON-USER: CPT | Performed by: INTERNAL MEDICINE

## 2018-12-17 PROCEDURE — G8427 DOCREV CUR MEDS BY ELIG CLIN: HCPCS | Performed by: INTERNAL MEDICINE

## 2018-12-17 PROCEDURE — 99214 OFFICE O/P EST MOD 30 MIN: CPT | Performed by: INTERNAL MEDICINE

## 2018-12-17 ASSESSMENT — ENCOUNTER SYMPTOMS
SHORTNESS OF BREATH: 1
COUGH: 1
EYE ITCHING: 0
BACK PAIN: 0
EYE DISCHARGE: 0
ABDOMINAL DISTENTION: 0
ABDOMINAL PAIN: 0

## 2018-12-17 ASSESSMENT — SLEEP AND FATIGUE QUESTIONNAIRES
HOW LIKELY ARE YOU TO NOD OFF OR FALL ASLEEP WHEN YOU ARE A PASSENGER IN A CAR FOR AN HOUR WITHOUT A BREAK: 3
HOW LIKELY ARE YOU TO NOD OFF OR FALL ASLEEP WHILE SITTING AND READING: 1
HOW LIKELY ARE YOU TO NOD OFF OR FALL ASLEEP WHILE SITTING INACTIVE IN A PUBLIC PLACE: 2
ESS TOTAL SCORE: 10
HOW LIKELY ARE YOU TO NOD OFF OR FALL ASLEEP WHILE WATCHING TV: 2
HOW LIKELY ARE YOU TO NOD OFF OR FALL ASLEEP WHILE SITTING AND TALKING TO SOMEONE: 0
NECK CIRCUMFERENCE (INCHES): 18
HOW LIKELY ARE YOU TO NOD OFF OR FALL ASLEEP WHILE SITTING QUIETLY AFTER LUNCH WITHOUT ALCOHOL: 1
HOW LIKELY ARE YOU TO NOD OFF OR FALL ASLEEP WHILE LYING DOWN TO REST IN THE AFTERNOON WHEN CIRCUMSTANCES PERMIT: 1
HOW LIKELY ARE YOU TO NOD OFF OR FALL ASLEEP IN A CAR, WHILE STOPPED FOR A FEW MINUTES IN TRAFFIC: 0

## 2018-12-24 ENCOUNTER — HOSPITAL ENCOUNTER (OUTPATIENT)
Age: 78
Setting detail: SPECIMEN
Discharge: HOME | End: 2018-12-24
Payer: MEDICARE

## 2018-12-24 LAB
ALBUMIN SERPL-MCNC: 4.2 GM/DL (ref 3.4–5)
ALP BLD-CCNC: 45 IU/L (ref 40–128)
ALT SERPL-CCNC: 17 U/L (ref 10–40)
ANION GAP SERPL CALCULATED.3IONS-SCNC: 12 MMOL/L (ref 4–16)
AST SERPL-CCNC: 22 IU/L (ref 15–37)
BILIRUB SERPL-MCNC: 0.6 MG/DL (ref 0–1)
BUN BLDV-MCNC: 19 MG/DL (ref 6–23)
CALCIUM SERPL-MCNC: 9.5 MG/DL (ref 8.3–10.6)
CHLORIDE BLD-SCNC: 104 MMOL/L (ref 99–110)
CO2: 27 MMOL/L (ref 21–32)
CREAT SERPL-MCNC: 1.3 MG/DL (ref 0.9–1.3)
GFR AFRICAN AMERICAN: >60 ML/MIN/1.73M2
GFR NON-AFRICAN AMERICAN: 54 ML/MIN/1.73M2
GLUCOSE BLD-MCNC: 155 MG/DL (ref 70–99)
POTASSIUM SERPL-SCNC: 4.6 MMOL/L (ref 3.5–5.1)
SODIUM BLD-SCNC: 143 MMOL/L (ref 135–145)
TOTAL PROTEIN: 7.2 GM/DL (ref 6.4–8.2)

## 2018-12-24 PROCEDURE — 80053 COMPREHEN METABOLIC PANEL: CPT

## 2018-12-24 PROCEDURE — 86316 IMMUNOASSAY TUMOR OTHER: CPT

## 2018-12-27 LAB — CHROMOGRANIN A: 240

## 2019-01-08 ENCOUNTER — HOSPITAL ENCOUNTER (OUTPATIENT)
Age: 79
Setting detail: SPECIMEN
Discharge: HOME | End: 2019-01-08
Payer: MEDICARE

## 2019-01-08 PROCEDURE — 83497 ASSAY OF 5-HIAA: CPT

## 2019-01-08 PROCEDURE — 81050 URINALYSIS VOLUME MEASURE: CPT

## 2019-01-09 LAB
Lab: 24 HRS
VOLUME, (UVOL): 975 MLS

## 2019-01-12 LAB
FINAL RESULT: NORMAL
PRELIMINARY: NORMAL
SPECIMEN SOURCE: NORMAL

## 2019-01-13 LAB
5 HIAA URINE (PER GM CREAT): 3 MG/GCR
5-HIAA 24 HOUR URINE: 4
5-HIAA INTERPRETATION: NORMAL
5-HIAA URINE: 4.1
CREATININE URINE: 146
CREATININE, 24H UR: 1424
TIME URINE: 24
VOLUME, (UVOL): 975

## 2019-01-17 ENCOUNTER — ANESTHESIA EVENT (OUTPATIENT)
Dept: OPERATING ROOM | Age: 79
DRG: 165 | End: 2019-01-17
Payer: MEDICARE

## 2019-01-21 ENCOUNTER — HOSPITAL ENCOUNTER (OUTPATIENT)
Dept: GENERAL RADIOLOGY | Age: 79
Discharge: HOME | End: 2019-01-21
Payer: MEDICARE

## 2019-01-21 ENCOUNTER — HOSPITAL ENCOUNTER (OUTPATIENT)
Dept: PREADMISSION TESTING | Age: 79
Discharge: HOME | End: 2019-01-25
Payer: MEDICARE

## 2019-01-21 VITALS
RESPIRATION RATE: 16 BRPM | WEIGHT: 213.38 LBS | HEART RATE: 74 BPM | DIASTOLIC BLOOD PRESSURE: 75 MMHG | BODY MASS INDEX: 29.87 KG/M2 | SYSTOLIC BLOOD PRESSURE: 153 MMHG | OXYGEN SATURATION: 95 % | TEMPERATURE: 97.1 F | HEIGHT: 71 IN

## 2019-01-21 DIAGNOSIS — Z41.9 ELECTIVE SURGERY: ICD-10-CM

## 2019-01-21 DIAGNOSIS — Z41.9 ELECTIVE SURGERY: Primary | ICD-10-CM

## 2019-01-21 LAB
ANION GAP SERPL CALCULATED.3IONS-SCNC: 11 MMOL/L (ref 4–16)
APTT: 34.5 SECONDS (ref 21.2–33)
BUN BLDV-MCNC: 18 MG/DL (ref 6–23)
CALCIUM SERPL-MCNC: 9.9 MG/DL (ref 8.3–10.6)
CHLORIDE BLD-SCNC: 103 MMOL/L (ref 99–110)
CO2: 27 MMOL/L (ref 21–32)
CREAT SERPL-MCNC: 1.3 MG/DL (ref 0.9–1.3)
EKG ATRIAL RATE: 61 BPM
EKG DIAGNOSIS: NORMAL
EKG P AXIS: 27 DEGREES
EKG P-R INTERVAL: 160 MS
EKG Q-T INTERVAL: 428 MS
EKG QRS DURATION: 92 MS
EKG QTC CALCULATION (BAZETT): 430 MS
EKG R AXIS: -13 DEGREES
EKG T AXIS: 31 DEGREES
EKG VENTRICULAR RATE: 61 BPM
GFR AFRICAN AMERICAN: >60 ML/MIN/1.73M2
GFR NON-AFRICAN AMERICAN: 54 ML/MIN/1.73M2
GLUCOSE BLD-MCNC: 174 MG/DL (ref 70–99)
HCT VFR BLD CALC: 45 % (ref 42–52)
HEMOGLOBIN: 14.2 GM/DL (ref 13.5–18)
INR BLD: 1.02 INDEX
MCH RBC QN AUTO: 31.4 PG (ref 27–31)
MCHC RBC AUTO-ENTMCNC: 31.6 % (ref 32–36)
MCV RBC AUTO: 99.6 FL (ref 78–100)
PDW BLD-RTO: 13.4 % (ref 11.7–14.9)
PLATELET # BLD: 192 K/CU MM (ref 140–440)
PMV BLD AUTO: 11 FL (ref 7.5–11.1)
POTASSIUM SERPL-SCNC: 4.7 MMOL/L (ref 3.5–5.1)
PROTHROMBIN TIME: 11.6 SECONDS (ref 9.12–12.5)
RBC # BLD: 4.52 M/CU MM (ref 4.6–6.2)
SODIUM BLD-SCNC: 141 MMOL/L (ref 135–145)
WBC # BLD: 8.5 K/CU MM (ref 4–10.5)

## 2019-01-21 PROCEDURE — P9016 RBC LEUKOCYTES REDUCED: HCPCS

## 2019-01-21 PROCEDURE — 36415 COLL VENOUS BLD VENIPUNCTURE: CPT

## 2019-01-21 PROCEDURE — 85027 COMPLETE CBC AUTOMATED: CPT

## 2019-01-21 PROCEDURE — 71046 X-RAY EXAM CHEST 2 VIEWS: CPT

## 2019-01-21 PROCEDURE — 85610 PROTHROMBIN TIME: CPT

## 2019-01-21 PROCEDURE — 93005 ELECTROCARDIOGRAM TRACING: CPT

## 2019-01-21 PROCEDURE — 86850 RBC ANTIBODY SCREEN: CPT

## 2019-01-21 PROCEDURE — 80048 BASIC METABOLIC PNL TOTAL CA: CPT

## 2019-01-21 PROCEDURE — 85730 THROMBOPLASTIN TIME PARTIAL: CPT

## 2019-01-21 PROCEDURE — 86901 BLOOD TYPING SEROLOGIC RH(D): CPT

## 2019-01-21 PROCEDURE — 86922 COMPATIBILITY TEST ANTIGLOB: CPT

## 2019-01-21 PROCEDURE — 86900 BLOOD TYPING SEROLOGIC ABO: CPT

## 2019-01-21 RX ORDER — ASPIRIN 81 MG/1
81 TABLET ORAL EVERY MORNING
Status: ON HOLD | COMMUNITY
End: 2019-04-25 | Stop reason: SDUPTHER

## 2019-01-21 RX ORDER — CEFAZOLIN SODIUM 2 G/100ML
2 INJECTION, SOLUTION INTRAVENOUS ONCE
Status: CANCELLED | OUTPATIENT
Start: 2019-01-28

## 2019-01-21 ASSESSMENT — ENCOUNTER SYMPTOMS
DYSPNEA ACTIVITY LEVEL: AFTER AMBULATING 1 FLIGHT OF STAIRS
SHORTNESS OF BREATH: 1

## 2019-01-25 ENCOUNTER — TELEPHONE (OUTPATIENT)
Dept: PULMONOLOGY | Age: 79
End: 2019-01-25

## 2019-01-28 ENCOUNTER — ANESTHESIA (OUTPATIENT)
Dept: OPERATING ROOM | Age: 79
DRG: 165 | End: 2019-01-28
Payer: MEDICARE

## 2019-01-28 ENCOUNTER — APPOINTMENT (OUTPATIENT)
Dept: GENERAL RADIOLOGY | Age: 79
DRG: 165 | End: 2019-01-28
Attending: SURGERY
Payer: MEDICARE

## 2019-01-28 ENCOUNTER — HOSPITAL ENCOUNTER (INPATIENT)
Age: 79
LOS: 2 days | Discharge: HOME | DRG: 165 | End: 2019-01-30
Attending: SURGERY | Admitting: SURGERY
Payer: MEDICARE

## 2019-01-28 VITALS
RESPIRATION RATE: 18 BRPM | OXYGEN SATURATION: 99 % | DIASTOLIC BLOOD PRESSURE: 49 MMHG | SYSTOLIC BLOOD PRESSURE: 95 MMHG | TEMPERATURE: 96.8 F

## 2019-01-28 DIAGNOSIS — C34.02 LUNG CANCER, MAIN BRONCHUS, LEFT (HCC): Primary | ICD-10-CM

## 2019-01-28 LAB
GLUCOSE BLD-MCNC: 153 MG/DL (ref 70–99)
GLUCOSE BLD-MCNC: 208 MG/DL (ref 70–99)
GLUCOSE BLD-MCNC: 222 MG/DL (ref 70–99)

## 2019-01-28 PROCEDURE — 6360000002 HC RX W HCPCS: Performed by: ANESTHESIOLOGY

## 2019-01-28 PROCEDURE — 2720000010 HC SURG SUPPLY STERILE: Performed by: SURGERY

## 2019-01-28 PROCEDURE — 07T70ZZ RESECTION OF THORAX LYMPHATIC, OPEN APPROACH: ICD-10-PCS | Performed by: SURGERY

## 2019-01-28 PROCEDURE — 7100000001 HC PACU RECOVERY - ADDTL 15 MIN: Performed by: SURGERY

## 2019-01-28 PROCEDURE — 3600000019 HC SURGERY ROBOT ADDTL 15MIN: Performed by: SURGERY

## 2019-01-28 PROCEDURE — 3600000009 HC SURGERY ROBOT BASE: Performed by: SURGERY

## 2019-01-28 PROCEDURE — 88309 TISSUE EXAM BY PATHOLOGIST: CPT

## 2019-01-28 PROCEDURE — 82962 GLUCOSE BLOOD TEST: CPT

## 2019-01-28 PROCEDURE — 2709999900 HC NON-CHARGEABLE SUPPLY: Performed by: SURGERY

## 2019-01-28 PROCEDURE — 6370000000 HC RX 637 (ALT 250 FOR IP): Performed by: ANESTHESIOLOGY

## 2019-01-28 PROCEDURE — 6360000002 HC RX W HCPCS: Performed by: SURGERY

## 2019-01-28 PROCEDURE — 3E0T3BZ INTRODUCTION OF ANESTHETIC AGENT INTO PERIPHERAL NERVES AND PLEXI, PERCUTANEOUS APPROACH: ICD-10-PCS | Performed by: SURGERY

## 2019-01-28 PROCEDURE — 0BTG0ZZ RESECTION OF LEFT UPPER LUNG LOBE, OPEN APPROACH: ICD-10-PCS | Performed by: SURGERY

## 2019-01-28 PROCEDURE — 3700000000 HC ANESTHESIA ATTENDED CARE: Performed by: SURGERY

## 2019-01-28 PROCEDURE — 71045 X-RAY EXAM CHEST 1 VIEW: CPT

## 2019-01-28 PROCEDURE — 6370000000 HC RX 637 (ALT 250 FOR IP)

## 2019-01-28 PROCEDURE — 94761 N-INVAS EAR/PLS OXIMETRY MLT: CPT

## 2019-01-28 PROCEDURE — 2500000003 HC RX 250 WO HCPCS: Performed by: NURSE ANESTHETIST, CERTIFIED REGISTERED

## 2019-01-28 PROCEDURE — 88332 PATH CONSLTJ SURG EA ADD BLK: CPT

## 2019-01-28 PROCEDURE — 88305 TISSUE EXAM BY PATHOLOGIST: CPT

## 2019-01-28 PROCEDURE — 6370000000 HC RX 637 (ALT 250 FOR IP): Performed by: SURGERY

## 2019-01-28 PROCEDURE — 2700000000 HC OXYGEN THERAPY PER DAY

## 2019-01-28 PROCEDURE — S2900 ROBOTIC SURGICAL SYSTEM: HCPCS | Performed by: SURGERY

## 2019-01-28 PROCEDURE — 88331 PATH CONSLTJ SURG 1 BLK 1SPC: CPT

## 2019-01-28 PROCEDURE — 3700000001 HC ADD 15 MINUTES (ANESTHESIA): Performed by: SURGERY

## 2019-01-28 PROCEDURE — 94640 AIRWAY INHALATION TREATMENT: CPT

## 2019-01-28 PROCEDURE — 6360000002 HC RX W HCPCS

## 2019-01-28 PROCEDURE — 0BJ08ZZ INSPECTION OF TRACHEOBRONCHIAL TREE, VIA NATURAL OR ARTIFICIAL OPENING ENDOSCOPIC: ICD-10-PCS | Performed by: SURGERY

## 2019-01-28 PROCEDURE — 8E0W0CZ ROBOTIC ASSISTED PROCEDURE OF TRUNK REGION, OPEN APPROACH: ICD-10-PCS | Performed by: SURGERY

## 2019-01-28 PROCEDURE — 7100000000 HC PACU RECOVERY - FIRST 15 MIN: Performed by: SURGERY

## 2019-01-28 PROCEDURE — 2580000003 HC RX 258: Performed by: NURSE ANESTHETIST, CERTIFIED REGISTERED

## 2019-01-28 PROCEDURE — 2580000003 HC RX 258: Performed by: ANESTHESIOLOGY

## 2019-01-28 PROCEDURE — 2580000003 HC RX 258: Performed by: SURGERY

## 2019-01-28 PROCEDURE — 6360000002 HC RX W HCPCS: Performed by: NURSE ANESTHETIST, CERTIFIED REGISTERED

## 2019-01-28 PROCEDURE — 2100000000 HC CCU R&B

## 2019-01-28 PROCEDURE — C1713 ANCHOR/SCREW BN/BN,TIS/BN: HCPCS | Performed by: SURGERY

## 2019-01-28 PROCEDURE — 2780000010 HC IMPLANT OTHER: Performed by: SURGERY

## 2019-01-28 PROCEDURE — C1729 CATH, DRAINAGE: HCPCS | Performed by: SURGERY

## 2019-01-28 DEVICE — SEALANT TISS GLUE 4ML PLEUR AIR LEAK PROGEL: Type: IMPLANTABLE DEVICE | Status: FUNCTIONAL

## 2019-01-28 RX ORDER — DONEPEZIL HYDROCHLORIDE 10 MG/1
5 TABLET, FILM COATED ORAL NIGHTLY
Status: DISCONTINUED | OUTPATIENT
Start: 2019-01-28 | End: 2019-01-30 | Stop reason: HOSPADM

## 2019-01-28 RX ORDER — ATORVASTATIN CALCIUM 20 MG/1
20 TABLET, FILM COATED ORAL NIGHTLY
Status: DISCONTINUED | OUTPATIENT
Start: 2019-01-28 | End: 2019-01-30 | Stop reason: HOSPADM

## 2019-01-28 RX ORDER — ONDANSETRON 2 MG/ML
INJECTION INTRAMUSCULAR; INTRAVENOUS PRN
Status: DISCONTINUED | OUTPATIENT
Start: 2019-01-28 | End: 2019-01-28 | Stop reason: SDUPTHER

## 2019-01-28 RX ORDER — MAGNESIUM HYDROXIDE 1200 MG/15ML
LIQUID ORAL
Status: COMPLETED | OUTPATIENT
Start: 2019-01-28 | End: 2019-01-28

## 2019-01-28 RX ORDER — CEFAZOLIN SODIUM 2 G/100ML
2 INJECTION, SOLUTION INTRAVENOUS EVERY 8 HOURS
Status: COMPLETED | OUTPATIENT
Start: 2019-01-28 | End: 2019-01-29

## 2019-01-28 RX ORDER — MORPHINE SULFATE 4 MG/ML
2 INJECTION, SOLUTION INTRAMUSCULAR; INTRAVENOUS
Status: DISCONTINUED | OUTPATIENT
Start: 2019-01-28 | End: 2019-01-29

## 2019-01-28 RX ORDER — SODIUM CHLORIDE 450 MG/100ML
INJECTION, SOLUTION INTRAVENOUS CONTINUOUS
Status: DISCONTINUED | OUTPATIENT
Start: 2019-01-28 | End: 2019-01-29 | Stop reason: ALTCHOICE

## 2019-01-28 RX ORDER — FENTANYL CITRATE 50 UG/ML
25 INJECTION, SOLUTION INTRAMUSCULAR; INTRAVENOUS EVERY 5 MIN PRN
Status: DISCONTINUED | OUTPATIENT
Start: 2019-01-28 | End: 2019-01-28 | Stop reason: HOSPADM

## 2019-01-28 RX ORDER — SODIUM CHLORIDE 0.9 % (FLUSH) 0.9 %
10 SYRINGE (ML) INJECTION PRN
Status: DISCONTINUED | OUTPATIENT
Start: 2019-01-28 | End: 2019-01-30 | Stop reason: HOSPADM

## 2019-01-28 RX ORDER — GLYCOPYRROLATE 0.2 MG/ML
INJECTION INTRAMUSCULAR; INTRAVENOUS PRN
Status: DISCONTINUED | OUTPATIENT
Start: 2019-01-28 | End: 2019-01-28 | Stop reason: SDUPTHER

## 2019-01-28 RX ORDER — LEVOTHYROXINE SODIUM 0.05 MG/1
50 TABLET ORAL DAILY
Status: DISCONTINUED | OUTPATIENT
Start: 2019-01-29 | End: 2019-01-30 | Stop reason: HOSPADM

## 2019-01-28 RX ORDER — KETOROLAC TROMETHAMINE 30 MG/ML
INJECTION, SOLUTION INTRAMUSCULAR; INTRAVENOUS
Status: COMPLETED
Start: 2019-01-28 | End: 2019-01-28

## 2019-01-28 RX ORDER — DOCUSATE SODIUM 100 MG/1
100 CAPSULE, LIQUID FILLED ORAL 2 TIMES DAILY
Status: DISCONTINUED | OUTPATIENT
Start: 2019-01-28 | End: 2019-01-30 | Stop reason: HOSPADM

## 2019-01-28 RX ORDER — CEFAZOLIN SODIUM 2 G/100ML
2 INJECTION, SOLUTION INTRAVENOUS ONCE
Status: COMPLETED | OUTPATIENT
Start: 2019-01-28 | End: 2019-01-28

## 2019-01-28 RX ORDER — MIDAZOLAM HYDROCHLORIDE 1 MG/ML
INJECTION INTRAMUSCULAR; INTRAVENOUS PRN
Status: DISCONTINUED | OUTPATIENT
Start: 2019-01-28 | End: 2019-01-28 | Stop reason: SDUPTHER

## 2019-01-28 RX ORDER — DEXAMETHASONE SODIUM PHOSPHATE 4 MG/ML
INJECTION, SOLUTION INTRA-ARTICULAR; INTRALESIONAL; INTRAMUSCULAR; INTRAVENOUS; SOFT TISSUE PRN
Status: DISCONTINUED | OUTPATIENT
Start: 2019-01-28 | End: 2019-01-28 | Stop reason: SDUPTHER

## 2019-01-28 RX ORDER — HYDROMORPHONE HCL 110MG/55ML
0.5 PATIENT CONTROLLED ANALGESIA SYRINGE INTRAVENOUS EVERY 5 MIN PRN
Status: DISCONTINUED | OUTPATIENT
Start: 2019-01-28 | End: 2019-01-28 | Stop reason: HOSPADM

## 2019-01-28 RX ORDER — SODIUM CHLORIDE, SODIUM LACTATE, POTASSIUM CHLORIDE, CALCIUM CHLORIDE 600; 310; 30; 20 MG/100ML; MG/100ML; MG/100ML; MG/100ML
INJECTION, SOLUTION INTRAVENOUS ONCE
Status: COMPLETED | OUTPATIENT
Start: 2019-01-28 | End: 2019-01-28

## 2019-01-28 RX ORDER — HYDRALAZINE HYDROCHLORIDE 20 MG/ML
5 INJECTION INTRAMUSCULAR; INTRAVENOUS EVERY 10 MIN PRN
Status: DISCONTINUED | OUTPATIENT
Start: 2019-01-28 | End: 2019-01-28 | Stop reason: HOSPADM

## 2019-01-28 RX ORDER — ASPIRIN 81 MG/1
81 TABLET ORAL EVERY MORNING
Status: DISCONTINUED | OUTPATIENT
Start: 2019-01-29 | End: 2019-01-30 | Stop reason: HOSPADM

## 2019-01-28 RX ORDER — MAGNESIUM HYDROXIDE 1200 MG/15ML
LIQUID ORAL CONTINUOUS PRN
Status: COMPLETED | OUTPATIENT
Start: 2019-01-28 | End: 2019-01-28

## 2019-01-28 RX ORDER — ROCURONIUM BROMIDE 10 MG/ML
INJECTION, SOLUTION INTRAVENOUS PRN
Status: DISCONTINUED | OUTPATIENT
Start: 2019-01-28 | End: 2019-01-28 | Stop reason: SDUPTHER

## 2019-01-28 RX ORDER — HYDROCODONE BITARTRATE AND ACETAMINOPHEN 5; 325 MG/1; MG/1
1 TABLET ORAL EVERY 4 HOURS PRN
Status: DISCONTINUED | OUTPATIENT
Start: 2019-01-28 | End: 2019-01-29 | Stop reason: ALTCHOICE

## 2019-01-28 RX ORDER — LIDOCAINE HYDROCHLORIDE 20 MG/ML
INJECTION, SOLUTION INFILTRATION; PERINEURAL PRN
Status: DISCONTINUED | OUTPATIENT
Start: 2019-01-28 | End: 2019-01-28 | Stop reason: SDUPTHER

## 2019-01-28 RX ORDER — KETOROLAC TROMETHAMINE 30 MG/ML
15 INJECTION, SOLUTION INTRAMUSCULAR; INTRAVENOUS EVERY 6 HOURS
Status: DISCONTINUED | OUTPATIENT
Start: 2019-01-28 | End: 2019-01-29

## 2019-01-28 RX ORDER — FENTANYL CITRATE 50 UG/ML
INJECTION, SOLUTION INTRAMUSCULAR; INTRAVENOUS PRN
Status: DISCONTINUED | OUTPATIENT
Start: 2019-01-28 | End: 2019-01-28 | Stop reason: SDUPTHER

## 2019-01-28 RX ORDER — SODIUM CHLORIDE 0.9 % (FLUSH) 0.9 %
10 SYRINGE (ML) INJECTION EVERY 12 HOURS SCHEDULED
Status: DISCONTINUED | OUTPATIENT
Start: 2019-01-28 | End: 2019-01-30 | Stop reason: HOSPADM

## 2019-01-28 RX ORDER — ONDANSETRON 2 MG/ML
4 INJECTION INTRAMUSCULAR; INTRAVENOUS EVERY 6 HOURS PRN
Status: DISCONTINUED | OUTPATIENT
Start: 2019-01-28 | End: 2019-01-30 | Stop reason: HOSPADM

## 2019-01-28 RX ORDER — SODIUM CHLORIDE, SODIUM LACTATE, POTASSIUM CHLORIDE, CALCIUM CHLORIDE 600; 310; 30; 20 MG/100ML; MG/100ML; MG/100ML; MG/100ML
INJECTION, SOLUTION INTRAVENOUS CONTINUOUS PRN
Status: DISCONTINUED | OUTPATIENT
Start: 2019-01-28 | End: 2019-01-28 | Stop reason: SDUPTHER

## 2019-01-28 RX ORDER — PROPOFOL 10 MG/ML
INJECTION, EMULSION INTRAVENOUS PRN
Status: DISCONTINUED | OUTPATIENT
Start: 2019-01-28 | End: 2019-01-28 | Stop reason: SDUPTHER

## 2019-01-28 RX ORDER — SODIUM CHLORIDE 9 MG/ML
INJECTION, SOLUTION INTRAVENOUS CONTINUOUS PRN
Status: DISCONTINUED | OUTPATIENT
Start: 2019-01-28 | End: 2019-01-28 | Stop reason: SDUPTHER

## 2019-01-28 RX ORDER — ACETAMINOPHEN 325 MG/1
650 TABLET ORAL EVERY 4 HOURS PRN
Status: DISCONTINUED | OUTPATIENT
Start: 2019-01-28 | End: 2019-01-30 | Stop reason: HOSPADM

## 2019-01-28 RX ORDER — IPRATROPIUM BROMIDE AND ALBUTEROL SULFATE 2.5; .5 MG/3ML; MG/3ML
1 SOLUTION RESPIRATORY (INHALATION)
Status: DISCONTINUED | OUTPATIENT
Start: 2019-01-28 | End: 2019-01-30 | Stop reason: HOSPADM

## 2019-01-28 RX ORDER — HYDROCODONE BITARTRATE AND ACETAMINOPHEN 5; 325 MG/1; MG/1
2 TABLET ORAL EVERY 4 HOURS PRN
Status: DISCONTINUED | OUTPATIENT
Start: 2019-01-28 | End: 2019-01-29 | Stop reason: ALTCHOICE

## 2019-01-28 RX ORDER — PIOGLITAZONEHYDROCHLORIDE 45 MG/1
45 TABLET ORAL DAILY
Status: DISCONTINUED | OUTPATIENT
Start: 2019-01-28 | End: 2019-01-30 | Stop reason: HOSPADM

## 2019-01-28 RX ADMIN — PHENYLEPHRINE HYDROCHLORIDE 200 MCG: 10 INJECTION INTRAVENOUS at 09:11

## 2019-01-28 RX ADMIN — SODIUM CHLORIDE, POTASSIUM CHLORIDE, SODIUM LACTATE AND CALCIUM CHLORIDE: 600; 310; 30; 20 INJECTION, SOLUTION INTRAVENOUS at 06:49

## 2019-01-28 RX ADMIN — KETOROLAC TROMETHAMINE 15 MG: 30 INJECTION, SOLUTION INTRAMUSCULAR; INTRAVENOUS at 15:48

## 2019-01-28 RX ADMIN — PHENYLEPHRINE HYDROCHLORIDE 100 MCG: 10 INJECTION INTRAVENOUS at 09:24

## 2019-01-28 RX ADMIN — SUGAMMADEX 200 MG: 100 INJECTION, SOLUTION INTRAVENOUS at 13:39

## 2019-01-28 RX ADMIN — FENTANYL CITRATE 25 MCG: 50 INJECTION, SOLUTION INTRAMUSCULAR; INTRAVENOUS at 14:20

## 2019-01-28 RX ADMIN — FENTANYL CITRATE 50 MCG: 50 INJECTION INTRAMUSCULAR; INTRAVENOUS at 11:41

## 2019-01-28 RX ADMIN — ROCURONIUM BROMIDE 20 MG: 50 INJECTION, SOLUTION INTRAVENOUS at 10:47

## 2019-01-28 RX ADMIN — SODIUM CHLORIDE, POTASSIUM CHLORIDE, SODIUM LACTATE AND CALCIUM CHLORIDE: 600; 310; 30; 20 INJECTION, SOLUTION INTRAVENOUS at 07:24

## 2019-01-28 RX ADMIN — FENTANYL CITRATE 50 MCG: 50 INJECTION INTRAMUSCULAR; INTRAVENOUS at 08:00

## 2019-01-28 RX ADMIN — PHENYLEPHRINE HYDROCHLORIDE 100 MCG: 10 INJECTION INTRAVENOUS at 10:11

## 2019-01-28 RX ADMIN — MIDAZOLAM HYDROCHLORIDE 1 MG: 1 INJECTION, SOLUTION INTRAMUSCULAR; INTRAVENOUS at 07:24

## 2019-01-28 RX ADMIN — INSULIN HUMAN 5 UNITS: 100 INJECTION, SOLUTION PARENTERAL at 14:12

## 2019-01-28 RX ADMIN — PHENYLEPHRINE HYDROCHLORIDE 200 MCG: 10 INJECTION INTRAVENOUS at 09:31

## 2019-01-28 RX ADMIN — LIDOCAINE HYDROCHLORIDE 100 MG: 20 INJECTION, SOLUTION INFILTRATION; PERINEURAL at 07:30

## 2019-01-28 RX ADMIN — DONEPEZIL HYDROCHLORIDE 5 MG: 10 TABLET, FILM COATED ORAL at 21:06

## 2019-01-28 RX ADMIN — CEFAZOLIN SODIUM 2 G: 2 INJECTION, SOLUTION INTRAVENOUS at 17:26

## 2019-01-28 RX ADMIN — DEXAMETHASONE SODIUM PHOSPHATE 8 MG: 4 INJECTION, SOLUTION INTRAMUSCULAR; INTRAVENOUS at 13:30

## 2019-01-28 RX ADMIN — ROCURONIUM BROMIDE 20 MG: 50 INJECTION, SOLUTION INTRAVENOUS at 08:14

## 2019-01-28 RX ADMIN — PHENYLEPHRINE HYDROCHLORIDE 100 MCG: 10 INJECTION INTRAVENOUS at 07:40

## 2019-01-28 RX ADMIN — DOCUSATE SODIUM 100 MG: 100 CAPSULE, LIQUID FILLED ORAL at 21:06

## 2019-01-28 RX ADMIN — PHENYLEPHRINE HYDROCHLORIDE 5 MCG: 10 INJECTION INTRAVENOUS at 10:06

## 2019-01-28 RX ADMIN — KETOROLAC TROMETHAMINE 15 MG: 30 INJECTION, SOLUTION INTRAMUSCULAR; INTRAVENOUS at 22:35

## 2019-01-28 RX ADMIN — ROCURONIUM BROMIDE 50 MG: 50 INJECTION, SOLUTION INTRAVENOUS at 07:30

## 2019-01-28 RX ADMIN — DEXAMETHASONE SODIUM PHOSPHATE 8 MG: 4 INJECTION, SOLUTION INTRAMUSCULAR; INTRAVENOUS at 07:50

## 2019-01-28 RX ADMIN — PHENYLEPHRINE HYDROCHLORIDE 100 MCG: 10 INJECTION INTRAVENOUS at 09:59

## 2019-01-28 RX ADMIN — FENTANYL CITRATE 50 MCG: 50 INJECTION INTRAMUSCULAR; INTRAVENOUS at 10:58

## 2019-01-28 RX ADMIN — PROPOFOL 40 MG: 10 INJECTION, EMULSION INTRAVENOUS at 08:00

## 2019-01-28 RX ADMIN — FENTANYL CITRATE 25 MCG: 50 INJECTION, SOLUTION INTRAMUSCULAR; INTRAVENOUS at 14:15

## 2019-01-28 RX ADMIN — SODIUM CHLORIDE: 9 INJECTION, SOLUTION INTRAVENOUS at 08:12

## 2019-01-28 RX ADMIN — ONDANSETRON HYDROCHLORIDE 4 MG: 2 SOLUTION INTRAMUSCULAR; INTRAVENOUS at 12:24

## 2019-01-28 RX ADMIN — GLYCOPYRROLATE 0.2 MG: 0.2 INJECTION, SOLUTION INTRAMUSCULAR; INTRAVENOUS at 08:00

## 2019-01-28 RX ADMIN — FENTANYL CITRATE 25 MCG: 50 INJECTION, SOLUTION INTRAMUSCULAR; INTRAVENOUS at 14:30

## 2019-01-28 RX ADMIN — INSULIN LISPRO 1 UNITS: 100 INJECTION, SOLUTION INTRAVENOUS; SUBCUTANEOUS at 22:28

## 2019-01-28 RX ADMIN — IPRATROPIUM BROMIDE AND ALBUTEROL SULFATE 1 AMPULE: .5; 3 SOLUTION RESPIRATORY (INHALATION) at 20:08

## 2019-01-28 RX ADMIN — PHENYLEPHRINE HYDROCHLORIDE 100 MCG: 10 INJECTION INTRAVENOUS at 09:18

## 2019-01-28 RX ADMIN — PHENYLEPHRINE HYDROCHLORIDE 100 MCG: 10 INJECTION INTRAVENOUS at 11:50

## 2019-01-28 RX ADMIN — FENTANYL CITRATE 100 MCG: 50 INJECTION INTRAMUSCULAR; INTRAVENOUS at 07:30

## 2019-01-28 RX ADMIN — PROPOFOL 200 MG: 10 INJECTION, EMULSION INTRAVENOUS at 07:30

## 2019-01-28 RX ADMIN — SODIUM CHLORIDE, PRESERVATIVE FREE 10 ML: 5 INJECTION INTRAVENOUS at 21:08

## 2019-01-28 RX ADMIN — PHENYLEPHRINE HYDROCHLORIDE 100 MCG: 10 INJECTION INTRAVENOUS at 09:49

## 2019-01-28 RX ADMIN — ROCURONIUM BROMIDE 30 MG: 50 INJECTION, SOLUTION INTRAVENOUS at 11:41

## 2019-01-28 RX ADMIN — SODIUM CHLORIDE, POTASSIUM CHLORIDE, SODIUM LACTATE AND CALCIUM CHLORIDE: 600; 310; 30; 20 INJECTION, SOLUTION INTRAVENOUS at 09:45

## 2019-01-28 RX ADMIN — MIDAZOLAM HYDROCHLORIDE 1 MG: 1 INJECTION, SOLUTION INTRAMUSCULAR; INTRAVENOUS at 08:40

## 2019-01-28 RX ADMIN — FENTANYL CITRATE 50 MCG: 50 INJECTION INTRAMUSCULAR; INTRAVENOUS at 08:27

## 2019-01-28 RX ADMIN — PHENYLEPHRINE HYDROCHLORIDE 200 MCG: 10 INJECTION INTRAVENOUS at 08:12

## 2019-01-28 RX ADMIN — SODIUM CHLORIDE: 4.5 INJECTION, SOLUTION INTRAVENOUS at 14:33

## 2019-01-28 RX ADMIN — PHENYLEPHRINE HYDROCHLORIDE 200 MCG: 10 INJECTION INTRAVENOUS at 13:18

## 2019-01-28 RX ADMIN — PHENYLEPHRINE HYDROCHLORIDE 200 MCG: 10 INJECTION INTRAVENOUS at 07:47

## 2019-01-28 RX ADMIN — HYDROMORPHONE HYDROCHLORIDE 0.5 MG: 2 INJECTION INTRAMUSCULAR; INTRAVENOUS; SUBCUTANEOUS at 14:08

## 2019-01-28 RX ADMIN — HYDROCODONE BITARTRATE AND ACETAMINOPHEN 1 TABLET: 5; 325 TABLET ORAL at 21:07

## 2019-01-28 RX ADMIN — HYDROCODONE BITARTRATE AND ACETAMINOPHEN 1 TABLET: 5; 325 TABLET ORAL at 19:54

## 2019-01-28 RX ADMIN — ATORVASTATIN CALCIUM 20 MG: 20 TABLET, FILM COATED ORAL at 21:07

## 2019-01-28 RX ADMIN — PHENYLEPHRINE HYDROCHLORIDE 200 MCG: 10 INJECTION INTRAVENOUS at 08:45

## 2019-01-28 RX ADMIN — CEFAZOLIN SODIUM 2 G: 2 INJECTION, SOLUTION INTRAVENOUS at 08:37

## 2019-01-28 RX ADMIN — HYDROMORPHONE HYDROCHLORIDE 0.5 MG: 2 INJECTION INTRAMUSCULAR; INTRAVENOUS; SUBCUTANEOUS at 14:01

## 2019-01-28 RX ADMIN — SERTRALINE HYDROCHLORIDE 50 MG: 50 TABLET ORAL at 21:07

## 2019-01-28 ASSESSMENT — PULMONARY FUNCTION TESTS
PIF_VALUE: 35
PIF_VALUE: 30
PIF_VALUE: 35
PIF_VALUE: 33
PIF_VALUE: 31
PIF_VALUE: 27
PIF_VALUE: 19
PIF_VALUE: 35
PIF_VALUE: 35
PIF_VALUE: 29
PIF_VALUE: 29
PIF_VALUE: 0
PIF_VALUE: 24
PIF_VALUE: 28
PIF_VALUE: 35
PIF_VALUE: 15
PIF_VALUE: 28
PIF_VALUE: 35
PIF_VALUE: 29
PIF_VALUE: 31
PIF_VALUE: 35
PIF_VALUE: 26
PIF_VALUE: 35
PIF_VALUE: 4
PIF_VALUE: 36
PIF_VALUE: 29
PIF_VALUE: 11
PIF_VALUE: 35
PIF_VALUE: 18
PIF_VALUE: 33
PIF_VALUE: 35
PIF_VALUE: 13
PIF_VALUE: 29
PIF_VALUE: 35
PIF_VALUE: 30
PIF_VALUE: 31
PIF_VALUE: 34
PIF_VALUE: 35
PIF_VALUE: 35
PIF_VALUE: 34
PIF_VALUE: 31
PIF_VALUE: 31
PIF_VALUE: 35
PIF_VALUE: 29
PIF_VALUE: 32
PIF_VALUE: 35
PIF_VALUE: 29
PIF_VALUE: 30
PIF_VALUE: 28
PIF_VALUE: 29
PIF_VALUE: 35
PIF_VALUE: 32
PIF_VALUE: 35
PIF_VALUE: 29
PIF_VALUE: 19
PIF_VALUE: 35
PIF_VALUE: 35
PIF_VALUE: 33
PIF_VALUE: 29
PIF_VALUE: 35
PIF_VALUE: 29
PIF_VALUE: 31
PIF_VALUE: 1
PIF_VALUE: 35
PIF_VALUE: 30
PIF_VALUE: 27
PIF_VALUE: 35
PIF_VALUE: 34
PIF_VALUE: 35
PIF_VALUE: 35
PIF_VALUE: 0
PIF_VALUE: 35
PIF_VALUE: 35
PIF_VALUE: 30
PIF_VALUE: 35
PIF_VALUE: 35
PIF_VALUE: 28
PIF_VALUE: 33
PIF_VALUE: 29
PIF_VALUE: 35
PIF_VALUE: 30
PIF_VALUE: 23
PIF_VALUE: 35
PIF_VALUE: 1
PIF_VALUE: 35
PIF_VALUE: 35
PIF_VALUE: 29
PIF_VALUE: 35
PIF_VALUE: 34
PIF_VALUE: 29
PIF_VALUE: 33
PIF_VALUE: 35
PIF_VALUE: 30
PIF_VALUE: 35
PIF_VALUE: 34
PIF_VALUE: 35
PIF_VALUE: 35
PIF_VALUE: 29
PIF_VALUE: 2
PIF_VALUE: 35
PIF_VALUE: 35
PIF_VALUE: 28
PIF_VALUE: 35
PIF_VALUE: 28
PIF_VALUE: 30
PIF_VALUE: 34
PIF_VALUE: 34
PIF_VALUE: 35
PIF_VALUE: 0
PIF_VALUE: 15
PIF_VALUE: 11
PIF_VALUE: 29
PIF_VALUE: 35
PIF_VALUE: 30
PIF_VALUE: 31
PIF_VALUE: 36
PIF_VALUE: 1
PIF_VALUE: 35
PIF_VALUE: 29
PIF_VALUE: 35
PIF_VALUE: 33
PIF_VALUE: 35
PIF_VALUE: 29
PIF_VALUE: 35
PIF_VALUE: 26
PIF_VALUE: 35
PIF_VALUE: 30
PIF_VALUE: 35
PIF_VALUE: 29
PIF_VALUE: 28
PIF_VALUE: 35
PIF_VALUE: 36
PIF_VALUE: 35
PIF_VALUE: 35
PIF_VALUE: 31
PIF_VALUE: 31
PIF_VALUE: 35
PIF_VALUE: 2
PIF_VALUE: 6
PIF_VALUE: 35
PIF_VALUE: 35
PIF_VALUE: 33
PIF_VALUE: 35
PIF_VALUE: 35
PIF_VALUE: 34
PIF_VALUE: 35
PIF_VALUE: 32
PIF_VALUE: 35
PIF_VALUE: 8
PIF_VALUE: 1
PIF_VALUE: 29
PIF_VALUE: 30
PIF_VALUE: 35
PIF_VALUE: 28
PIF_VALUE: 28
PIF_VALUE: 35
PIF_VALUE: 1
PIF_VALUE: 30
PIF_VALUE: 35
PIF_VALUE: 4
PIF_VALUE: 28
PIF_VALUE: 35
PIF_VALUE: 29
PIF_VALUE: 35
PIF_VALUE: 34
PIF_VALUE: 29
PIF_VALUE: 16
PIF_VALUE: 34
PIF_VALUE: 35
PIF_VALUE: 2
PIF_VALUE: 35
PIF_VALUE: 35
PIF_VALUE: 29
PIF_VALUE: 36
PIF_VALUE: 35
PIF_VALUE: 25
PIF_VALUE: 34
PIF_VALUE: 35
PIF_VALUE: 35
PIF_VALUE: 27
PIF_VALUE: 35
PIF_VALUE: 35
PIF_VALUE: 28
PIF_VALUE: 35
PIF_VALUE: 29
PIF_VALUE: 32
PIF_VALUE: 35
PIF_VALUE: 0
PIF_VALUE: 35
PIF_VALUE: 35
PIF_VALUE: 22
PIF_VALUE: 35
PIF_VALUE: 30
PIF_VALUE: 28
PIF_VALUE: 33
PIF_VALUE: 35
PIF_VALUE: 2
PIF_VALUE: 24
PIF_VALUE: 35
PIF_VALUE: 36
PIF_VALUE: 35
PIF_VALUE: 33
PIF_VALUE: 35
PIF_VALUE: 28
PIF_VALUE: 35
PIF_VALUE: 35
PIF_VALUE: 31
PIF_VALUE: 34
PIF_VALUE: 28
PIF_VALUE: 35
PIF_VALUE: 30
PIF_VALUE: 35
PIF_VALUE: 35
PIF_VALUE: 30
PIF_VALUE: 34
PIF_VALUE: 35
PIF_VALUE: 22
PIF_VALUE: 29
PIF_VALUE: 29
PIF_VALUE: 35
PIF_VALUE: 35
PIF_VALUE: 2
PIF_VALUE: 35
PIF_VALUE: 3
PIF_VALUE: 35
PIF_VALUE: 41
PIF_VALUE: 32
PIF_VALUE: 35
PIF_VALUE: 33
PIF_VALUE: 35
PIF_VALUE: 29
PIF_VALUE: 35
PIF_VALUE: 28
PIF_VALUE: 35
PIF_VALUE: 35
PIF_VALUE: 29
PIF_VALUE: 35
PIF_VALUE: 35
PIF_VALUE: 2
PIF_VALUE: 8
PIF_VALUE: 27
PIF_VALUE: 35
PIF_VALUE: 35
PIF_VALUE: 0
PIF_VALUE: 4
PIF_VALUE: 35
PIF_VALUE: 32
PIF_VALUE: 29
PIF_VALUE: 27
PIF_VALUE: 35
PIF_VALUE: 30
PIF_VALUE: 35
PIF_VALUE: 35
PIF_VALUE: 36
PIF_VALUE: 35
PIF_VALUE: 0
PIF_VALUE: 35
PIF_VALUE: 30
PIF_VALUE: 28
PIF_VALUE: 28
PIF_VALUE: 29
PIF_VALUE: 35
PIF_VALUE: 25
PIF_VALUE: 35
PIF_VALUE: 31
PIF_VALUE: 29
PIF_VALUE: 35
PIF_VALUE: 35
PIF_VALUE: 28
PIF_VALUE: 29
PIF_VALUE: 35
PIF_VALUE: 11
PIF_VALUE: 28
PIF_VALUE: 35
PIF_VALUE: 34
PIF_VALUE: 29
PIF_VALUE: 35
PIF_VALUE: 29
PIF_VALUE: 35
PIF_VALUE: 26
PIF_VALUE: 35
PIF_VALUE: 35
PIF_VALUE: 29
PIF_VALUE: 30
PIF_VALUE: 0
PIF_VALUE: 35
PIF_VALUE: 35
PIF_VALUE: 16
PIF_VALUE: 35
PIF_VALUE: 33
PIF_VALUE: 35
PIF_VALUE: 35
PIF_VALUE: 29
PIF_VALUE: 35
PIF_VALUE: 35
PIF_VALUE: 29
PIF_VALUE: 35
PIF_VALUE: 41
PIF_VALUE: 35
PIF_VALUE: 17
PIF_VALUE: 34
PIF_VALUE: 35
PIF_VALUE: 29
PIF_VALUE: 31
PIF_VALUE: 35
PIF_VALUE: 35
PIF_VALUE: 24
PIF_VALUE: 35
PIF_VALUE: 31
PIF_VALUE: 28
PIF_VALUE: 28
PIF_VALUE: 35

## 2019-01-28 ASSESSMENT — PAIN SCALES - GENERAL
PAINLEVEL_OUTOF10: 0
PAINLEVEL_OUTOF10: 6
PAINLEVEL_OUTOF10: 10
PAINLEVEL_OUTOF10: 7
PAINLEVEL_OUTOF10: 0
PAINLEVEL_OUTOF10: 0
PAINLEVEL_OUTOF10: 7
PAINLEVEL_OUTOF10: 5
PAINLEVEL_OUTOF10: 7
PAINLEVEL_OUTOF10: 0
PAINLEVEL_OUTOF10: 7
PAINLEVEL_OUTOF10: 2
PAINLEVEL_OUTOF10: 7
PAINLEVEL_OUTOF10: 0

## 2019-01-28 ASSESSMENT — PAIN DESCRIPTION - PAIN TYPE
TYPE: SURGICAL PAIN

## 2019-01-28 ASSESSMENT — PAIN DESCRIPTION - FREQUENCY
FREQUENCY: CONTINUOUS
FREQUENCY: CONTINUOUS

## 2019-01-28 ASSESSMENT — PAIN - FUNCTIONAL ASSESSMENT: PAIN_FUNCTIONAL_ASSESSMENT: 0-10

## 2019-01-28 ASSESSMENT — PAIN DESCRIPTION - PROGRESSION: CLINICAL_PROGRESSION: NOT CHANGED

## 2019-01-28 ASSESSMENT — PAIN DESCRIPTION - ORIENTATION
ORIENTATION: LEFT
ORIENTATION: LEFT;OTHER (COMMENT)

## 2019-01-28 ASSESSMENT — PAIN DESCRIPTION - LOCATION
LOCATION: CHEST

## 2019-01-29 ENCOUNTER — APPOINTMENT (OUTPATIENT)
Dept: GENERAL RADIOLOGY | Age: 79
DRG: 165 | End: 2019-01-29
Attending: SURGERY
Payer: MEDICARE

## 2019-01-29 LAB
ANION GAP SERPL CALCULATED.3IONS-SCNC: 12 MMOL/L (ref 4–16)
BUN BLDV-MCNC: 31 MG/DL (ref 6–23)
CALCIUM SERPL-MCNC: 8.4 MG/DL (ref 8.3–10.6)
CHLORIDE BLD-SCNC: 101 MMOL/L (ref 99–110)
CO2: 25 MMOL/L (ref 21–32)
CREAT SERPL-MCNC: 1.7 MG/DL (ref 0.9–1.3)
GFR AFRICAN AMERICAN: 48 ML/MIN/1.73M2
GFR NON-AFRICAN AMERICAN: 39 ML/MIN/1.73M2
GLUCOSE BLD-MCNC: 152 MG/DL (ref 70–99)
GLUCOSE BLD-MCNC: 172 MG/DL (ref 70–99)
GLUCOSE BLD-MCNC: 175 MG/DL (ref 70–99)
GLUCOSE BLD-MCNC: 187 MG/DL (ref 70–99)
GLUCOSE BLD-MCNC: 223 MG/DL (ref 70–99)
HCT VFR BLD CALC: 36.9 % (ref 42–52)
HEMOGLOBIN: 11.5 GM/DL (ref 13.5–18)
MCH RBC QN AUTO: 31.3 PG (ref 27–31)
MCHC RBC AUTO-ENTMCNC: 31.2 % (ref 32–36)
MCV RBC AUTO: 100.3 FL (ref 78–100)
PDW BLD-RTO: 13.2 % (ref 11.7–14.9)
PLATELET # BLD: 159 K/CU MM (ref 140–440)
PMV BLD AUTO: 11.3 FL (ref 7.5–11.1)
POTASSIUM SERPL-SCNC: 5 MMOL/L (ref 3.5–5.1)
RBC # BLD: 3.68 M/CU MM (ref 4.6–6.2)
SODIUM BLD-SCNC: 138 MMOL/L (ref 135–145)
WBC # BLD: 13.4 K/CU MM (ref 4–10.5)

## 2019-01-29 PROCEDURE — 6370000000 HC RX 637 (ALT 250 FOR IP): Performed by: SURGERY

## 2019-01-29 PROCEDURE — 2580000003 HC RX 258: Performed by: SURGERY

## 2019-01-29 PROCEDURE — 71045 X-RAY EXAM CHEST 1 VIEW: CPT

## 2019-01-29 PROCEDURE — 2700000000 HC OXYGEN THERAPY PER DAY

## 2019-01-29 PROCEDURE — 80048 BASIC METABOLIC PNL TOTAL CA: CPT

## 2019-01-29 PROCEDURE — 2100000000 HC CCU R&B

## 2019-01-29 PROCEDURE — 94640 AIRWAY INHALATION TREATMENT: CPT

## 2019-01-29 PROCEDURE — 6360000002 HC RX W HCPCS: Performed by: SURGERY

## 2019-01-29 PROCEDURE — 82962 GLUCOSE BLOOD TEST: CPT

## 2019-01-29 PROCEDURE — 94761 N-INVAS EAR/PLS OXIMETRY MLT: CPT

## 2019-01-29 PROCEDURE — 85027 COMPLETE CBC AUTOMATED: CPT

## 2019-01-29 RX ORDER — ACETAMINOPHEN 10 MG/ML
1000 INJECTION, SOLUTION INTRAVENOUS EVERY 6 HOURS
Status: COMPLETED | OUTPATIENT
Start: 2019-01-29 | End: 2019-01-30

## 2019-01-29 RX ORDER — TRAMADOL HYDROCHLORIDE 50 MG/1
50 TABLET ORAL EVERY 4 HOURS PRN
Status: DISCONTINUED | OUTPATIENT
Start: 2019-01-29 | End: 2019-01-30

## 2019-01-29 RX ADMIN — IPRATROPIUM BROMIDE AND ALBUTEROL SULFATE 1 AMPULE: .5; 3 SOLUTION RESPIRATORY (INHALATION) at 12:06

## 2019-01-29 RX ADMIN — HYDROCODONE BITARTRATE AND ACETAMINOPHEN 2 TABLET: 5; 325 TABLET ORAL at 07:38

## 2019-01-29 RX ADMIN — SODIUM CHLORIDE, PRESERVATIVE FREE 10 ML: 5 INJECTION INTRAVENOUS at 21:19

## 2019-01-29 RX ADMIN — SERTRALINE HYDROCHLORIDE 50 MG: 50 TABLET ORAL at 21:18

## 2019-01-29 RX ADMIN — DOCUSATE SODIUM 100 MG: 100 CAPSULE, LIQUID FILLED ORAL at 21:18

## 2019-01-29 RX ADMIN — INSULIN LISPRO 1 UNITS: 100 INJECTION, SOLUTION INTRAVENOUS; SUBCUTANEOUS at 07:57

## 2019-01-29 RX ADMIN — HYDROCODONE BITARTRATE AND ACETAMINOPHEN 2 TABLET: 5; 325 TABLET ORAL at 03:19

## 2019-01-29 RX ADMIN — ACETAMINOPHEN 1000 MG: 10 INJECTION, SOLUTION INTRAVENOUS at 16:32

## 2019-01-29 RX ADMIN — SODIUM CHLORIDE, PRESERVATIVE FREE 10 ML: 5 INJECTION INTRAVENOUS at 08:02

## 2019-01-29 RX ADMIN — DONEPEZIL HYDROCHLORIDE 5 MG: 10 TABLET, FILM COATED ORAL at 21:24

## 2019-01-29 RX ADMIN — INSULIN LISPRO 2 UNITS: 100 INJECTION, SOLUTION INTRAVENOUS; SUBCUTANEOUS at 12:24

## 2019-01-29 RX ADMIN — ASPIRIN 81 MG: 81 TABLET, COATED ORAL at 08:02

## 2019-01-29 RX ADMIN — HYDROCODONE BITARTRATE AND ACETAMINOPHEN 2 TABLET: 5; 325 TABLET ORAL at 16:39

## 2019-01-29 RX ADMIN — MORPHINE SULFATE 2 MG: 4 INJECTION INTRAVENOUS at 09:18

## 2019-01-29 RX ADMIN — INSULIN LISPRO 1 UNITS: 100 INJECTION, SOLUTION INTRAVENOUS; SUBCUTANEOUS at 16:44

## 2019-01-29 RX ADMIN — IPRATROPIUM BROMIDE AND ALBUTEROL SULFATE 1 AMPULE: .5; 3 SOLUTION RESPIRATORY (INHALATION) at 08:21

## 2019-01-29 RX ADMIN — LINAGLIPTIN 5 MG: 5 TABLET, FILM COATED ORAL at 08:02

## 2019-01-29 RX ADMIN — DOCUSATE SODIUM 100 MG: 100 CAPSULE, LIQUID FILLED ORAL at 08:02

## 2019-01-29 RX ADMIN — ATORVASTATIN CALCIUM 20 MG: 20 TABLET, FILM COATED ORAL at 21:18

## 2019-01-29 RX ADMIN — HYDROCODONE BITARTRATE AND ACETAMINOPHEN 2 TABLET: 5; 325 TABLET ORAL at 12:32

## 2019-01-29 RX ADMIN — INSULIN LISPRO 1 UNITS: 100 INJECTION, SOLUTION INTRAVENOUS; SUBCUTANEOUS at 21:27

## 2019-01-29 RX ADMIN — CEFAZOLIN SODIUM 2 G: 2 INJECTION, SOLUTION INTRAVENOUS at 01:00

## 2019-01-29 RX ADMIN — IPRATROPIUM BROMIDE AND ALBUTEROL SULFATE 1 AMPULE: .5; 3 SOLUTION RESPIRATORY (INHALATION) at 21:40

## 2019-01-29 RX ADMIN — PIOGLITAZONE 45 MG: 45 TABLET ORAL at 08:02

## 2019-01-29 RX ADMIN — LEVOTHYROXINE SODIUM 50 MCG: 50 TABLET ORAL at 07:38

## 2019-01-29 RX ADMIN — ACETAMINOPHEN 1000 MG: 10 INJECTION, SOLUTION INTRAVENOUS at 22:52

## 2019-01-29 ASSESSMENT — PAIN DESCRIPTION - LOCATION
LOCATION: CHEST

## 2019-01-29 ASSESSMENT — PAIN DESCRIPTION - PROGRESSION
CLINICAL_PROGRESSION: GRADUALLY WORSENING
CLINICAL_PROGRESSION: GRADUALLY IMPROVING
CLINICAL_PROGRESSION: GRADUALLY WORSENING
CLINICAL_PROGRESSION: GRADUALLY WORSENING
CLINICAL_PROGRESSION: GRADUALLY IMPROVING
CLINICAL_PROGRESSION: GRADUALLY WORSENING
CLINICAL_PROGRESSION: GRADUALLY IMPROVING

## 2019-01-29 ASSESSMENT — PAIN DESCRIPTION - PAIN TYPE
TYPE: ACUTE PAIN;SURGICAL PAIN
TYPE: ACUTE PAIN;SURGICAL PAIN
TYPE: SURGICAL PAIN;ACUTE PAIN
TYPE: ACUTE PAIN;SURGICAL PAIN
TYPE: SURGICAL PAIN

## 2019-01-29 ASSESSMENT — PAIN SCALES - GENERAL
PAINLEVEL_OUTOF10: 3
PAINLEVEL_OUTOF10: 4
PAINLEVEL_OUTOF10: 0
PAINLEVEL_OUTOF10: 8
PAINLEVEL_OUTOF10: 9
PAINLEVEL_OUTOF10: 8
PAINLEVEL_OUTOF10: 7
PAINLEVEL_OUTOF10: 4
PAINLEVEL_OUTOF10: 6
PAINLEVEL_OUTOF10: 0
PAINLEVEL_OUTOF10: 7

## 2019-01-29 ASSESSMENT — PAIN DESCRIPTION - FREQUENCY
FREQUENCY: CONTINUOUS
FREQUENCY: CONTINUOUS
FREQUENCY: INTERMITTENT
FREQUENCY: CONTINUOUS
FREQUENCY: CONTINUOUS
FREQUENCY: INTERMITTENT
FREQUENCY: CONTINUOUS

## 2019-01-29 ASSESSMENT — PAIN DESCRIPTION - DESCRIPTORS
DESCRIPTORS: ACHING
DESCRIPTORS: ACHING;CONSTANT
DESCRIPTORS: ACHING
DESCRIPTORS: ACHING

## 2019-01-29 ASSESSMENT — PAIN DESCRIPTION - ORIENTATION
ORIENTATION: LEFT
ORIENTATION: RIGHT
ORIENTATION: LEFT
ORIENTATION: LEFT
ORIENTATION: RIGHT
ORIENTATION: LEFT
ORIENTATION: LEFT

## 2019-01-29 ASSESSMENT — PAIN DESCRIPTION - ONSET
ONSET: ON-GOING

## 2019-01-30 ENCOUNTER — APPOINTMENT (OUTPATIENT)
Dept: GENERAL RADIOLOGY | Age: 79
DRG: 165 | End: 2019-01-30
Attending: SURGERY
Payer: MEDICARE

## 2019-01-30 VITALS
RESPIRATION RATE: 17 BRPM | TEMPERATURE: 97.8 F | HEART RATE: 70 BPM | WEIGHT: 216.93 LBS | SYSTOLIC BLOOD PRESSURE: 115 MMHG | OXYGEN SATURATION: 97 % | BODY MASS INDEX: 30.26 KG/M2 | DIASTOLIC BLOOD PRESSURE: 67 MMHG

## 2019-01-30 LAB
ANION GAP SERPL CALCULATED.3IONS-SCNC: 12 MMOL/L (ref 4–16)
BUN BLDV-MCNC: 30 MG/DL (ref 6–23)
CALCIUM SERPL-MCNC: 8.6 MG/DL (ref 8.3–10.6)
CHLORIDE BLD-SCNC: 103 MMOL/L (ref 99–110)
CO2: 26 MMOL/L (ref 21–32)
CREAT SERPL-MCNC: 1.4 MG/DL (ref 0.9–1.3)
GFR AFRICAN AMERICAN: 59 ML/MIN/1.73M2
GFR NON-AFRICAN AMERICAN: 49 ML/MIN/1.73M2
GLUCOSE BLD-MCNC: 125 MG/DL (ref 70–99)
GLUCOSE BLD-MCNC: 225 MG/DL (ref 70–99)
GLUCOSE BLD-MCNC: 225 MG/DL (ref 70–99)
POTASSIUM SERPL-SCNC: 4.3 MMOL/L (ref 3.5–5.1)
SODIUM BLD-SCNC: 141 MMOL/L (ref 135–145)

## 2019-01-30 PROCEDURE — 94761 N-INVAS EAR/PLS OXIMETRY MLT: CPT

## 2019-01-30 PROCEDURE — 6360000002 HC RX W HCPCS: Performed by: SURGERY

## 2019-01-30 PROCEDURE — 80048 BASIC METABOLIC PNL TOTAL CA: CPT

## 2019-01-30 PROCEDURE — 94680 O2 UPTK RST&XERS DIR SIMPLE: CPT

## 2019-01-30 PROCEDURE — 6370000000 HC RX 637 (ALT 250 FOR IP): Performed by: SURGERY

## 2019-01-30 PROCEDURE — 98960 EDU&TRN PT SELF-MGMT NQHP 1: CPT

## 2019-01-30 PROCEDURE — 71045 X-RAY EXAM CHEST 1 VIEW: CPT

## 2019-01-30 PROCEDURE — 6370000000 HC RX 637 (ALT 250 FOR IP): Performed by: INTERNAL MEDICINE

## 2019-01-30 PROCEDURE — 94640 AIRWAY INHALATION TREATMENT: CPT

## 2019-01-30 PROCEDURE — 82962 GLUCOSE BLOOD TEST: CPT

## 2019-01-30 PROCEDURE — 6370000000 HC RX 637 (ALT 250 FOR IP): Performed by: THORACIC SURGERY (CARDIOTHORACIC VASCULAR SURGERY)

## 2019-01-30 PROCEDURE — 2580000003 HC RX 258: Performed by: SURGERY

## 2019-01-30 RX ORDER — HYDROCODONE BITARTRATE AND ACETAMINOPHEN 5; 325 MG/1; MG/1
2 TABLET ORAL EVERY 4 HOURS PRN
Status: DISCONTINUED | OUTPATIENT
Start: 2019-01-30 | End: 2019-01-30 | Stop reason: HOSPADM

## 2019-01-30 RX ORDER — LIDOCAINE 4 G/G
1 PATCH TOPICAL DAILY
Status: DISCONTINUED | OUTPATIENT
Start: 2019-01-30 | End: 2019-01-30 | Stop reason: HOSPADM

## 2019-01-30 RX ORDER — FUROSEMIDE 20 MG/1
20 TABLET ORAL ONCE
Status: COMPLETED | OUTPATIENT
Start: 2019-01-30 | End: 2019-01-30

## 2019-01-30 RX ORDER — LIDOCAINE 4 G/G
1 PATCH TOPICAL DAILY
Qty: 4 PATCH | Refills: 0 | Status: ON HOLD | OUTPATIENT
Start: 2019-01-31 | End: 2019-03-14 | Stop reason: HOSPADM

## 2019-01-30 RX ORDER — HYDROCODONE BITARTRATE AND ACETAMINOPHEN 5; 325 MG/1; MG/1
1 TABLET ORAL EVERY 6 HOURS PRN
Qty: 20 TABLET | Refills: 0 | Status: SHIPPED | OUTPATIENT
Start: 2019-01-30 | End: 2019-02-02

## 2019-01-30 RX ORDER — HYDROCODONE BITARTRATE AND ACETAMINOPHEN 5; 325 MG/1; MG/1
2 TABLET ORAL ONCE
Status: COMPLETED | OUTPATIENT
Start: 2019-01-30 | End: 2019-01-30

## 2019-01-30 RX ORDER — HYDROCODONE BITARTRATE AND ACETAMINOPHEN 5; 325 MG/1; MG/1
1 TABLET ORAL EVERY 4 HOURS PRN
Status: DISCONTINUED | OUTPATIENT
Start: 2019-01-30 | End: 2019-01-30 | Stop reason: HOSPADM

## 2019-01-30 RX ORDER — SODIUM CHLORIDE, SODIUM LACTATE, POTASSIUM CHLORIDE, CALCIUM CHLORIDE 600; 310; 30; 20 MG/100ML; MG/100ML; MG/100ML; MG/100ML
INJECTION, SOLUTION INTRAVENOUS
Status: DISCONTINUED
Start: 2019-01-30 | End: 2019-01-30 | Stop reason: HOSPADM

## 2019-01-30 RX ADMIN — ACETAMINOPHEN 1000 MG: 10 INJECTION, SOLUTION INTRAVENOUS at 04:44

## 2019-01-30 RX ADMIN — TRAMADOL HYDROCHLORIDE 50 MG: 50 TABLET, FILM COATED ORAL at 02:35

## 2019-01-30 RX ADMIN — FUROSEMIDE 20 MG: 20 TABLET ORAL at 11:06

## 2019-01-30 RX ADMIN — PIOGLITAZONE 45 MG: 45 TABLET ORAL at 08:49

## 2019-01-30 RX ADMIN — LINAGLIPTIN 5 MG: 5 TABLET, FILM COATED ORAL at 08:25

## 2019-01-30 RX ADMIN — ASPIRIN 81 MG: 81 TABLET, COATED ORAL at 08:25

## 2019-01-30 RX ADMIN — DOCUSATE SODIUM 100 MG: 100 CAPSULE, LIQUID FILLED ORAL at 08:25

## 2019-01-30 RX ADMIN — HYDROCODONE BITARTRATE AND ACETAMINOPHEN 2 TABLET: 5; 325 TABLET ORAL at 08:25

## 2019-01-30 RX ADMIN — IPRATROPIUM BROMIDE AND ALBUTEROL SULFATE 1 AMPULE: .5; 3 SOLUTION RESPIRATORY (INHALATION) at 12:20

## 2019-01-30 RX ADMIN — TRAMADOL HYDROCHLORIDE 50 MG: 50 TABLET, FILM COATED ORAL at 07:32

## 2019-01-30 RX ADMIN — HYDROCODONE BITARTRATE AND ACETAMINOPHEN 2 TABLET: 5; 325 TABLET ORAL at 13:48

## 2019-01-30 RX ADMIN — INSULIN LISPRO 2 UNITS: 100 INJECTION, SOLUTION INTRAVENOUS; SUBCUTANEOUS at 11:14

## 2019-01-30 RX ADMIN — LEVOTHYROXINE SODIUM 50 MCG: 50 TABLET ORAL at 07:32

## 2019-01-30 RX ADMIN — IPRATROPIUM BROMIDE AND ALBUTEROL SULFATE 1 AMPULE: .5; 3 SOLUTION RESPIRATORY (INHALATION) at 08:14

## 2019-01-30 RX ADMIN — ACETAMINOPHEN 1000 MG: 10 INJECTION, SOLUTION INTRAVENOUS at 11:05

## 2019-01-30 RX ADMIN — SODIUM CHLORIDE, PRESERVATIVE FREE 10 ML: 5 INJECTION INTRAVENOUS at 09:00

## 2019-01-30 ASSESSMENT — PAIN SCALES - GENERAL
PAINLEVEL_OUTOF10: 4
PAINLEVEL_OUTOF10: 6
PAINLEVEL_OUTOF10: 7
PAINLEVEL_OUTOF10: 8
PAINLEVEL_OUTOF10: 0
PAINLEVEL_OUTOF10: 10
PAINLEVEL_OUTOF10: 7
PAINLEVEL_OUTOF10: 0
PAINLEVEL_OUTOF10: 2
PAINLEVEL_OUTOF10: 4
PAINLEVEL_OUTOF10: 2
PAINLEVEL_OUTOF10: 5

## 2019-02-03 LAB
ACID FAST SMEAR: NORMAL
ACID FAST SMEAR: NORMAL
FINAL RESULT: NORMAL
PRELIMINARY: NORMAL
SPECIMEN SOURCE: NORMAL

## 2019-02-11 ENCOUNTER — OFFICE VISIT (OUTPATIENT)
Dept: FAMILY MEDICINE CLINIC | Age: 79
End: 2019-02-11
Payer: MEDICARE

## 2019-02-11 ENCOUNTER — HOSPITAL ENCOUNTER (OUTPATIENT)
Dept: GENERAL RADIOLOGY | Age: 79
Discharge: HOME | End: 2019-02-11
Payer: MEDICARE

## 2019-02-11 ENCOUNTER — HOSPITAL ENCOUNTER (OUTPATIENT)
Age: 79
Discharge: HOME | End: 2019-02-11
Payer: MEDICARE

## 2019-02-11 VITALS
HEART RATE: 93 BPM | DIASTOLIC BLOOD PRESSURE: 64 MMHG | OXYGEN SATURATION: 97 % | WEIGHT: 223.6 LBS | BODY MASS INDEX: 31.19 KG/M2 | SYSTOLIC BLOOD PRESSURE: 122 MMHG | TEMPERATURE: 96.5 F

## 2019-02-11 DIAGNOSIS — C34.02 LUNG CANCER, MAIN BRONCHUS, LEFT (HCC): Primary | ICD-10-CM

## 2019-02-11 DIAGNOSIS — Z09 HOSPITAL DISCHARGE FOLLOW-UP: ICD-10-CM

## 2019-02-11 DIAGNOSIS — K59.03 DRUG-INDUCED CONSTIPATION: ICD-10-CM

## 2019-02-11 DIAGNOSIS — R10.84 GENERALIZED ABDOMINAL PAIN: ICD-10-CM

## 2019-02-11 DIAGNOSIS — R10.84 ABDOMINAL PAIN, GENERALIZED: ICD-10-CM

## 2019-02-11 PROCEDURE — 99214 OFFICE O/P EST MOD 30 MIN: CPT | Performed by: FAMILY MEDICINE

## 2019-02-11 PROCEDURE — 1123F ACP DISCUSS/DSCN MKR DOCD: CPT | Performed by: FAMILY MEDICINE

## 2019-02-11 PROCEDURE — 4040F PNEUMOC VAC/ADMIN/RCVD: CPT | Performed by: FAMILY MEDICINE

## 2019-02-11 PROCEDURE — G8482 FLU IMMUNIZE ORDER/ADMIN: HCPCS | Performed by: FAMILY MEDICINE

## 2019-02-11 PROCEDURE — G8428 CUR MEDS NOT DOCUMENT: HCPCS | Performed by: FAMILY MEDICINE

## 2019-02-11 PROCEDURE — G8598 ASA/ANTIPLAT THER USED: HCPCS | Performed by: FAMILY MEDICINE

## 2019-02-11 PROCEDURE — 1101F PT FALLS ASSESS-DOCD LE1/YR: CPT | Performed by: FAMILY MEDICINE

## 2019-02-11 PROCEDURE — 74018 RADEX ABDOMEN 1 VIEW: CPT

## 2019-02-11 PROCEDURE — 1036F TOBACCO NON-USER: CPT | Performed by: FAMILY MEDICINE

## 2019-02-11 PROCEDURE — 1111F DSCHRG MED/CURRENT MED MERGE: CPT | Performed by: FAMILY MEDICINE

## 2019-02-11 PROCEDURE — G8417 CALC BMI ABV UP PARAM F/U: HCPCS | Performed by: FAMILY MEDICINE

## 2019-02-11 RX ORDER — LACTULOSE 10 G/15ML
10 SOLUTION ORAL EVERY EVENING
Qty: 236 ML | Refills: 0 | Status: SHIPPED | OUTPATIENT
Start: 2019-02-11 | End: 2019-02-24

## 2019-02-11 ASSESSMENT — PATIENT HEALTH QUESTIONNAIRE - PHQ9
SUM OF ALL RESPONSES TO PHQ QUESTIONS 1-9: 0
SUM OF ALL RESPONSES TO PHQ QUESTIONS 1-9: 0
2. FEELING DOWN, DEPRESSED OR HOPELESS: 0
1. LITTLE INTEREST OR PLEASURE IN DOING THINGS: 0
SUM OF ALL RESPONSES TO PHQ9 QUESTIONS 1 & 2: 0

## 2019-02-12 ENCOUNTER — TELEPHONE (OUTPATIENT)
Dept: FAMILY MEDICINE CLINIC | Age: 79
End: 2019-02-12

## 2019-02-15 ENCOUNTER — TELEPHONE (OUTPATIENT)
Dept: FAMILY MEDICINE CLINIC | Age: 79
End: 2019-02-15

## 2019-02-20 ENCOUNTER — HOSPITAL ENCOUNTER (OUTPATIENT)
Age: 79
Discharge: HOME | End: 2019-02-20
Payer: MEDICARE

## 2019-02-20 ENCOUNTER — HOSPITAL ENCOUNTER (OUTPATIENT)
Dept: GENERAL RADIOLOGY | Age: 79
Discharge: HOME | End: 2019-02-20
Payer: MEDICARE

## 2019-02-20 DIAGNOSIS — R91.8 ENDOBRONCHIAL MASS: ICD-10-CM

## 2019-02-20 PROCEDURE — 71046 X-RAY EXAM CHEST 2 VIEWS: CPT

## 2019-02-24 ENCOUNTER — HOSPITAL ENCOUNTER (EMERGENCY)
Age: 79
Discharge: HOME | End: 2019-02-24
Attending: EMERGENCY MEDICINE
Payer: MEDICARE

## 2019-02-24 ENCOUNTER — APPOINTMENT (OUTPATIENT)
Dept: CT IMAGING | Age: 79
End: 2019-02-24
Payer: MEDICARE

## 2019-02-24 VITALS
SYSTOLIC BLOOD PRESSURE: 135 MMHG | HEART RATE: 65 BPM | DIASTOLIC BLOOD PRESSURE: 89 MMHG | HEIGHT: 72 IN | TEMPERATURE: 97.5 F | OXYGEN SATURATION: 98 % | WEIGHT: 203 LBS | RESPIRATION RATE: 24 BRPM | BODY MASS INDEX: 27.5 KG/M2

## 2019-02-24 DIAGNOSIS — N40.0 ENLARGED PROSTATE: ICD-10-CM

## 2019-02-24 DIAGNOSIS — K59.09 OTHER CONSTIPATION: Primary | ICD-10-CM

## 2019-02-24 LAB
ALBUMIN SERPL-MCNC: 3.7 GM/DL (ref 3.4–5)
ALP BLD-CCNC: 72 IU/L (ref 40–129)
ALT SERPL-CCNC: 14 U/L (ref 10–40)
ANION GAP SERPL CALCULATED.3IONS-SCNC: 12 MMOL/L (ref 4–16)
AST SERPL-CCNC: 21 IU/L (ref 15–37)
BASOPHILS ABSOLUTE: 0.1 K/CU MM
BASOPHILS RELATIVE PERCENT: 1.2 % (ref 0–1)
BILIRUB SERPL-MCNC: 0.5 MG/DL (ref 0–1)
BUN BLDV-MCNC: 15 MG/DL (ref 6–23)
CALCIUM SERPL-MCNC: 8.9 MG/DL (ref 8.3–10.6)
CHLORIDE BLD-SCNC: 98 MMOL/L (ref 99–110)
CO2: 26 MMOL/L (ref 21–32)
CREAT SERPL-MCNC: 1.3 MG/DL (ref 0.9–1.3)
DIFFERENTIAL TYPE: ABNORMAL
EOSINOPHILS ABSOLUTE: 0.5 K/CU MM
EOSINOPHILS RELATIVE PERCENT: 5.6 % (ref 0–3)
GFR AFRICAN AMERICAN: >60 ML/MIN/1.73M2
GFR NON-AFRICAN AMERICAN: 54 ML/MIN/1.73M2
GLUCOSE BLD-MCNC: 165 MG/DL (ref 70–99)
HCT VFR BLD CALC: 43.8 % (ref 42–52)
HEMOGLOBIN: 14 GM/DL (ref 13.5–18)
IMMATURE NEUTROPHIL %: 0.3 % (ref 0–0.43)
INR BLD: 1.08 INDEX
LACTATE: 1.5 MMOL/L (ref 0.4–2)
LIPASE: 50 IU/L (ref 13–60)
LYMPHOCYTES ABSOLUTE: 3 K/CU MM
LYMPHOCYTES RELATIVE PERCENT: 33.4 % (ref 24–44)
MCH RBC QN AUTO: 31 PG (ref 27–31)
MCHC RBC AUTO-ENTMCNC: 32 % (ref 32–36)
MCV RBC AUTO: 97.1 FL (ref 78–100)
MONOCYTES ABSOLUTE: 0.6 K/CU MM
MONOCYTES RELATIVE PERCENT: 6.7 % (ref 0–4)
NUCLEATED RBC %: 0 %
PDW BLD-RTO: 13 % (ref 11.7–14.9)
PLATELET # BLD: 231 K/CU MM (ref 140–440)
PMV BLD AUTO: 11 FL (ref 7.5–11.1)
POTASSIUM SERPL-SCNC: 3.8 MMOL/L (ref 3.5–5.1)
PRO-BNP: 140.2 PG/ML
PROTHROMBIN TIME: 12.3 SECONDS (ref 9.12–12.5)
RBC # BLD: 4.51 M/CU MM (ref 4.6–6.2)
SEGMENTED NEUTROPHILS ABSOLUTE COUNT: 4.8 K/CU MM
SEGMENTED NEUTROPHILS RELATIVE PERCENT: 52.8 % (ref 36–66)
SODIUM BLD-SCNC: 136 MMOL/L (ref 135–145)
TOTAL IMMATURE NEUTOROPHIL: 0.03 K/CU MM
TOTAL NUCLEATED RBC: 0 K/CU MM
TOTAL PROTEIN: 7.3 GM/DL (ref 6.4–8.2)
TROPONIN T: <0.01 NG/ML
WBC # BLD: 9.1 K/CU MM (ref 4–10.5)

## 2019-02-24 PROCEDURE — 4500000027

## 2019-02-24 PROCEDURE — 85610 PROTHROMBIN TIME: CPT

## 2019-02-24 PROCEDURE — 84484 ASSAY OF TROPONIN QUANT: CPT

## 2019-02-24 PROCEDURE — 74177 CT ABD & PELVIS W/CONTRAST: CPT

## 2019-02-24 PROCEDURE — 83690 ASSAY OF LIPASE: CPT

## 2019-02-24 PROCEDURE — 99284 EMERGENCY DEPT VISIT MOD MDM: CPT

## 2019-02-24 PROCEDURE — 85025 COMPLETE CBC W/AUTO DIFF WBC: CPT

## 2019-02-24 PROCEDURE — 6360000004 HC RX CONTRAST MEDICATION: Performed by: EMERGENCY MEDICINE

## 2019-02-24 PROCEDURE — 93010 ELECTROCARDIOGRAM REPORT: CPT | Performed by: INTERNAL MEDICINE

## 2019-02-24 PROCEDURE — 6370000000 HC RX 637 (ALT 250 FOR IP): Performed by: EMERGENCY MEDICINE

## 2019-02-24 PROCEDURE — 2580000003 HC RX 258: Performed by: EMERGENCY MEDICINE

## 2019-02-24 PROCEDURE — 71275 CT ANGIOGRAPHY CHEST: CPT

## 2019-02-24 PROCEDURE — 80053 COMPREHEN METABOLIC PANEL: CPT

## 2019-02-24 PROCEDURE — 83605 ASSAY OF LACTIC ACID: CPT

## 2019-02-24 PROCEDURE — 87040 BLOOD CULTURE FOR BACTERIA: CPT

## 2019-02-24 PROCEDURE — 83880 ASSAY OF NATRIURETIC PEPTIDE: CPT

## 2019-02-24 PROCEDURE — 93005 ELECTROCARDIOGRAM TRACING: CPT | Performed by: EMERGENCY MEDICINE

## 2019-02-24 RX ORDER — TAMSULOSIN HYDROCHLORIDE 0.4 MG/1
0.4 CAPSULE ORAL DAILY
Status: DISCONTINUED | OUTPATIENT
Start: 2019-02-24 | End: 2019-02-24 | Stop reason: HOSPADM

## 2019-02-24 RX ORDER — TAMSULOSIN HYDROCHLORIDE 0.4 MG/1
0.4 CAPSULE ORAL DAILY
Qty: 30 CAPSULE | Refills: 0 | Status: SHIPPED | OUTPATIENT
Start: 2019-02-24 | End: 2019-03-29 | Stop reason: SINTOL

## 2019-02-24 RX ORDER — LACTULOSE 10 G/15ML
20 SOLUTION ORAL ONCE
Status: COMPLETED | OUTPATIENT
Start: 2019-02-24 | End: 2019-02-24

## 2019-02-24 RX ORDER — 0.9 % SODIUM CHLORIDE 0.9 %
10 VIAL (ML) INJECTION
Status: COMPLETED | OUTPATIENT
Start: 2019-02-24 | End: 2019-02-24

## 2019-02-24 RX ADMIN — MAGESIUM CITRATE 296 ML: 1.75 LIQUID ORAL at 14:30

## 2019-02-24 RX ADMIN — LACTULOSE 20 G: 10 SOLUTION ORAL at 14:30

## 2019-02-24 RX ADMIN — IOPAMIDOL 90 ML: 755 INJECTION, SOLUTION INTRAVENOUS at 12:53

## 2019-02-24 RX ADMIN — SODIUM CHLORIDE, PRESERVATIVE FREE 10 ML: 5 INJECTION INTRAVENOUS at 12:53

## 2019-02-24 RX ADMIN — TAMSULOSIN HYDROCHLORIDE 0.4 MG: 0.4 CAPSULE ORAL at 14:30

## 2019-02-28 LAB
EKG ATRIAL RATE: 66 BPM
EKG DIAGNOSIS: NORMAL
EKG P AXIS: -16 DEGREES
EKG P-R INTERVAL: 86 MS
EKG Q-T INTERVAL: 416 MS
EKG QRS DURATION: 104 MS
EKG QTC CALCULATION (BAZETT): 436 MS
EKG R AXIS: -21 DEGREES
EKG T AXIS: 23 DEGREES
EKG VENTRICULAR RATE: 66 BPM

## 2019-03-01 LAB
CULTURE: NORMAL
CULTURE: NORMAL
Lab: NORMAL
Lab: NORMAL
REPORT STATUS: NORMAL
REPORT STATUS: NORMAL
SPECIMEN: NORMAL
SPECIMEN: NORMAL

## 2019-03-11 ENCOUNTER — HOSPITAL ENCOUNTER (OUTPATIENT)
Dept: GENERAL RADIOLOGY | Age: 79
Discharge: HOME | DRG: 187 | End: 2019-03-11
Payer: MEDICARE

## 2019-03-11 ENCOUNTER — HOSPITAL ENCOUNTER (OUTPATIENT)
Age: 79
Discharge: HOME | DRG: 187 | End: 2019-03-11
Payer: MEDICARE

## 2019-03-11 DIAGNOSIS — R91.8 ENDOBRONCHIAL MASS: ICD-10-CM

## 2019-03-11 PROCEDURE — 71046 X-RAY EXAM CHEST 2 VIEWS: CPT

## 2019-03-12 ENCOUNTER — APPOINTMENT (OUTPATIENT)
Dept: GENERAL RADIOLOGY | Age: 79
DRG: 187 | End: 2019-03-12
Attending: SURGERY
Payer: MEDICARE

## 2019-03-12 ENCOUNTER — HOSPITAL ENCOUNTER (INPATIENT)
Age: 79
LOS: 2 days | Discharge: NURSING, REHAB OR LONG-TERM CARE | DRG: 187 | End: 2019-03-14
Attending: SURGERY | Admitting: SURGERY
Payer: MEDICARE

## 2019-03-12 PROBLEM — J18.9 PNEUMONIA: Status: ACTIVE | Noted: 2019-03-12

## 2019-03-12 LAB
ALBUMIN SERPL-MCNC: 3.7 GM/DL (ref 3.4–5)
ALP BLD-CCNC: 64 IU/L (ref 40–129)
ALT SERPL-CCNC: 12 U/L (ref 10–40)
ANION GAP SERPL CALCULATED.3IONS-SCNC: 13 MMOL/L (ref 4–16)
AST SERPL-CCNC: 19 IU/L (ref 15–37)
BILIRUB SERPL-MCNC: 0.5 MG/DL (ref 0–1)
BUN BLDV-MCNC: 17 MG/DL (ref 6–23)
CALCIUM SERPL-MCNC: 9.1 MG/DL (ref 8.3–10.6)
CHLORIDE BLD-SCNC: 104 MMOL/L (ref 99–110)
CO2: 23 MMOL/L (ref 21–32)
CREAT SERPL-MCNC: 1.2 MG/DL (ref 0.9–1.3)
GFR AFRICAN AMERICAN: >60 ML/MIN/1.73M2
GFR NON-AFRICAN AMERICAN: 59 ML/MIN/1.73M2
GLUCOSE BLD-MCNC: 115 MG/DL (ref 70–99)
GLUCOSE BLD-MCNC: 117 MG/DL (ref 70–99)
GLUCOSE BLD-MCNC: 130 MG/DL (ref 70–99)
HCT VFR BLD CALC: 40.7 % (ref 42–52)
HEMOGLOBIN: 12.6 GM/DL (ref 13.5–18)
MCH RBC QN AUTO: 30.1 PG (ref 27–31)
MCHC RBC AUTO-ENTMCNC: 31 % (ref 32–36)
MCV RBC AUTO: 97.4 FL (ref 78–100)
PDW BLD-RTO: 13.2 % (ref 11.7–14.9)
PLATELET # BLD: 205 K/CU MM (ref 140–440)
PMV BLD AUTO: 10.9 FL (ref 7.5–11.1)
POTASSIUM SERPL-SCNC: 4.2 MMOL/L (ref 3.5–5.1)
RBC # BLD: 4.18 M/CU MM (ref 4.6–6.2)
SODIUM BLD-SCNC: 140 MMOL/L (ref 135–145)
TOTAL PROTEIN: 6.7 GM/DL (ref 6.4–8.2)
WBC # BLD: 7.9 K/CU MM (ref 4–10.5)

## 2019-03-12 PROCEDURE — 6370000000 HC RX 637 (ALT 250 FOR IP): Performed by: PHYSICIAN ASSISTANT

## 2019-03-12 PROCEDURE — 1200000000 HC SEMI PRIVATE

## 2019-03-12 PROCEDURE — 2580000003 HC RX 258: Performed by: SURGERY

## 2019-03-12 PROCEDURE — 85027 COMPLETE CBC AUTOMATED: CPT

## 2019-03-12 PROCEDURE — 71045 X-RAY EXAM CHEST 1 VIEW: CPT

## 2019-03-12 PROCEDURE — 6370000000 HC RX 637 (ALT 250 FOR IP): Performed by: INTERNAL MEDICINE

## 2019-03-12 PROCEDURE — 94761 N-INVAS EAR/PLS OXIMETRY MLT: CPT

## 2019-03-12 PROCEDURE — 74018 RADEX ABDOMEN 1 VIEW: CPT

## 2019-03-12 PROCEDURE — 36415 COLL VENOUS BLD VENIPUNCTURE: CPT

## 2019-03-12 PROCEDURE — 80053 COMPREHEN METABOLIC PANEL: CPT

## 2019-03-12 PROCEDURE — 82962 GLUCOSE BLOOD TEST: CPT

## 2019-03-12 RX ORDER — DONEPEZIL HYDROCHLORIDE 5 MG/1
5 TABLET, FILM COATED ORAL NIGHTLY
Status: DISCONTINUED | OUTPATIENT
Start: 2019-03-12 | End: 2019-03-14 | Stop reason: HOSPADM

## 2019-03-12 RX ORDER — TAMSULOSIN HYDROCHLORIDE 0.4 MG/1
0.4 CAPSULE ORAL DAILY
Status: DISCONTINUED | OUTPATIENT
Start: 2019-03-12 | End: 2019-03-14 | Stop reason: HOSPADM

## 2019-03-12 RX ORDER — ACETAMINOPHEN 325 MG/1
650 TABLET ORAL EVERY 4 HOURS PRN
Status: DISCONTINUED | OUTPATIENT
Start: 2019-03-12 | End: 2019-03-14 | Stop reason: HOSPADM

## 2019-03-12 RX ORDER — DOCUSATE SODIUM 100 MG/1
100 CAPSULE, LIQUID FILLED ORAL DAILY
Status: DISCONTINUED | OUTPATIENT
Start: 2019-03-12 | End: 2019-03-12

## 2019-03-12 RX ORDER — ATORVASTATIN CALCIUM 20 MG/1
20 TABLET, FILM COATED ORAL NIGHTLY
Status: DISCONTINUED | OUTPATIENT
Start: 2019-03-12 | End: 2019-03-14 | Stop reason: HOSPADM

## 2019-03-12 RX ORDER — SERTRALINE HYDROCHLORIDE 100 MG/1
100 TABLET, FILM COATED ORAL NIGHTLY
Status: DISCONTINUED | OUTPATIENT
Start: 2019-03-12 | End: 2019-03-12

## 2019-03-12 RX ORDER — POLYETHYLENE GLYCOL 3350 17 G/17G
17 POWDER, FOR SOLUTION ORAL 2 TIMES DAILY
Refills: 6 | COMMUNITY
Start: 2019-02-27 | End: 2019-07-02

## 2019-03-12 RX ORDER — LIDOCAINE 4 G/G
1 PATCH TOPICAL DAILY
Status: DISCONTINUED | OUTPATIENT
Start: 2019-03-12 | End: 2019-03-12

## 2019-03-12 RX ORDER — ASPIRIN 81 MG/1
81 TABLET ORAL EVERY MORNING
Status: DISCONTINUED | OUTPATIENT
Start: 2019-03-13 | End: 2019-03-14 | Stop reason: HOSPADM

## 2019-03-12 RX ORDER — DOCUSATE SODIUM 100 MG/1
100 CAPSULE, LIQUID FILLED ORAL 2 TIMES DAILY
Status: DISCONTINUED | OUTPATIENT
Start: 2019-03-12 | End: 2019-03-14 | Stop reason: HOSPADM

## 2019-03-12 RX ORDER — SENNA PLUS 8.6 MG/1
2 TABLET ORAL 2 TIMES DAILY
Status: DISCONTINUED | OUTPATIENT
Start: 2019-03-12 | End: 2019-03-14 | Stop reason: HOSPADM

## 2019-03-12 RX ORDER — LEVOTHYROXINE SODIUM 0.05 MG/1
50 TABLET ORAL EVERY MORNING
Status: DISCONTINUED | OUTPATIENT
Start: 2019-03-13 | End: 2019-03-14 | Stop reason: HOSPADM

## 2019-03-12 RX ORDER — SODIUM CHLORIDE 450 MG/100ML
INJECTION, SOLUTION INTRAVENOUS CONTINUOUS
Status: DISCONTINUED | OUTPATIENT
Start: 2019-03-12 | End: 2019-03-14 | Stop reason: HOSPADM

## 2019-03-12 RX ORDER — NORTRIPTYLINE HYDROCHLORIDE 25 MG/1
25 CAPSULE ORAL NIGHTLY
Status: DISCONTINUED | OUTPATIENT
Start: 2019-03-12 | End: 2019-03-14 | Stop reason: HOSPADM

## 2019-03-12 RX ORDER — GABAPENTIN 100 MG/1
100 CAPSULE ORAL 3 TIMES DAILY
Status: DISCONTINUED | OUTPATIENT
Start: 2019-03-12 | End: 2019-03-14 | Stop reason: HOSPADM

## 2019-03-12 RX ORDER — CAPSAICIN 0.025 %
CREAM (GRAM) TOPICAL 2 TIMES DAILY
Status: DISCONTINUED | OUTPATIENT
Start: 2019-03-12 | End: 2019-03-13

## 2019-03-12 RX ORDER — POLYETHYLENE GLYCOL 3350 17 G/17G
17 POWDER, FOR SOLUTION ORAL 2 TIMES DAILY
Status: DISCONTINUED | OUTPATIENT
Start: 2019-03-12 | End: 2019-03-14 | Stop reason: HOSPADM

## 2019-03-12 RX ORDER — PIOGLITAZONEHYDROCHLORIDE 45 MG/1
45 TABLET ORAL DAILY
Status: DISCONTINUED | OUTPATIENT
Start: 2019-03-13 | End: 2019-03-14 | Stop reason: HOSPADM

## 2019-03-12 RX ORDER — BISACODYL 10 MG
10 SUPPOSITORY, RECTAL RECTAL DAILY
Status: DISCONTINUED | OUTPATIENT
Start: 2019-03-12 | End: 2019-03-14 | Stop reason: HOSPADM

## 2019-03-12 RX ADMIN — SODIUM CHLORIDE: 4.5 INJECTION, SOLUTION INTRAVENOUS at 21:36

## 2019-03-12 RX ADMIN — TAMSULOSIN HYDROCHLORIDE 0.4 MG: 0.4 CAPSULE ORAL at 21:32

## 2019-03-12 RX ADMIN — NORTRIPTYLINE HYDROCHLORIDE 25 MG: 25 CAPSULE ORAL at 21:49

## 2019-03-12 RX ADMIN — DONEPEZIL HYDROCHLORIDE 5 MG: 5 TABLET, FILM COATED ORAL at 21:27

## 2019-03-12 RX ADMIN — ATORVASTATIN CALCIUM 20 MG: 20 TABLET, FILM COATED ORAL at 21:26

## 2019-03-12 RX ADMIN — SENNOSIDES 17.2 MG: 8.6 TABLET ORAL at 21:26

## 2019-03-12 RX ADMIN — POLYETHYLENE GLYCOL 3350 17 G: 17 POWDER, FOR SOLUTION ORAL at 21:26

## 2019-03-12 RX ADMIN — DOCUSATE SODIUM 100 MG: 100 CAPSULE, LIQUID FILLED ORAL at 21:26

## 2019-03-12 RX ADMIN — CAPSAICIN: 0.25 CREAM TOPICAL at 21:32

## 2019-03-12 RX ADMIN — GABAPENTIN 100 MG: 100 CAPSULE ORAL at 21:26

## 2019-03-12 ASSESSMENT — PAIN DESCRIPTION - ONSET
ONSET: ON-GOING
ONSET: OTHER (COMMENT)
ONSET: ON-GOING

## 2019-03-12 ASSESSMENT — PAIN DESCRIPTION - LOCATION
LOCATION: ABDOMEN

## 2019-03-12 ASSESSMENT — PAIN DESCRIPTION - PAIN TYPE
TYPE: ACUTE PAIN

## 2019-03-12 ASSESSMENT — PAIN SCALES - GENERAL
PAINLEVEL_OUTOF10: 1
PAINLEVEL_OUTOF10: 2
PAINLEVEL_OUTOF10: 0
PAINLEVEL_OUTOF10: 3

## 2019-03-12 ASSESSMENT — PAIN DESCRIPTION - DESCRIPTORS
DESCRIPTORS: DISCOMFORT
DESCRIPTORS: ACHING;DISCOMFORT
DESCRIPTORS: ACHING;DISCOMFORT

## 2019-03-12 ASSESSMENT — PAIN DESCRIPTION - PROGRESSION
CLINICAL_PROGRESSION: GRADUALLY WORSENING
CLINICAL_PROGRESSION: NOT CHANGED

## 2019-03-12 ASSESSMENT — PAIN DESCRIPTION - FREQUENCY
FREQUENCY: CONTINUOUS
FREQUENCY: INTERMITTENT
FREQUENCY: CONTINUOUS

## 2019-03-12 ASSESSMENT — PAIN - FUNCTIONAL ASSESSMENT
PAIN_FUNCTIONAL_ASSESSMENT: PREVENTS OR INTERFERES SOME ACTIVE ACTIVITIES AND ADLS
PAIN_FUNCTIONAL_ASSESSMENT: PREVENTS OR INTERFERES WITH MANY ACTIVE NOT PASSIVE ACTIVITIES
PAIN_FUNCTIONAL_ASSESSMENT: PREVENTS OR INTERFERES SOME ACTIVE ACTIVITIES AND ADLS

## 2019-03-12 ASSESSMENT — PAIN DESCRIPTION - ORIENTATION
ORIENTATION: LEFT

## 2019-03-13 ENCOUNTER — APPOINTMENT (OUTPATIENT)
Dept: ULTRASOUND IMAGING | Age: 79
DRG: 187 | End: 2019-03-13
Attending: SURGERY
Payer: MEDICARE

## 2019-03-13 LAB
ANION GAP SERPL CALCULATED.3IONS-SCNC: 11 MMOL/L (ref 4–16)
BUN BLDV-MCNC: 16 MG/DL (ref 6–23)
CALCIUM SERPL-MCNC: 8.6 MG/DL (ref 8.3–10.6)
CHLORIDE BLD-SCNC: 104 MMOL/L (ref 99–110)
CO2: 25 MMOL/L (ref 21–32)
CREAT SERPL-MCNC: 1.2 MG/DL (ref 0.9–1.3)
GFR AFRICAN AMERICAN: >60 ML/MIN/1.73M2
GFR NON-AFRICAN AMERICAN: 59 ML/MIN/1.73M2
GLUCOSE BLD-MCNC: 124 MG/DL (ref 70–99)
GLUCOSE BLD-MCNC: 139 MG/DL (ref 70–99)
GLUCOSE BLD-MCNC: 170 MG/DL (ref 70–99)
HCT VFR BLD CALC: 39 % (ref 42–52)
HEMOGLOBIN: 12.2 GM/DL (ref 13.5–18)
MCH RBC QN AUTO: 30.3 PG (ref 27–31)
MCHC RBC AUTO-ENTMCNC: 31.3 % (ref 32–36)
MCV RBC AUTO: 96.8 FL (ref 78–100)
PDW BLD-RTO: 13.2 % (ref 11.7–14.9)
PLATELET # BLD: 186 K/CU MM (ref 140–440)
PMV BLD AUTO: 10.5 FL (ref 7.5–11.1)
POTASSIUM SERPL-SCNC: 4 MMOL/L (ref 3.5–5.1)
RBC # BLD: 4.03 M/CU MM (ref 4.6–6.2)
SODIUM BLD-SCNC: 140 MMOL/L (ref 135–145)
T4 FREE: 1.39 NG/DL (ref 0.9–1.8)
TSH HIGH SENSITIVITY: 1.34 UIU/ML (ref 0.27–4.2)
WBC # BLD: 7.4 K/CU MM (ref 4–10.5)

## 2019-03-13 PROCEDURE — 6370000000 HC RX 637 (ALT 250 FOR IP): Performed by: PHYSICIAN ASSISTANT

## 2019-03-13 PROCEDURE — 6370000000 HC RX 637 (ALT 250 FOR IP): Performed by: NURSE PRACTITIONER

## 2019-03-13 PROCEDURE — 1200000000 HC SEMI PRIVATE

## 2019-03-13 PROCEDURE — 94640 AIRWAY INHALATION TREATMENT: CPT

## 2019-03-13 PROCEDURE — 2709999900 HC NON-CHARGEABLE SUPPLY

## 2019-03-13 PROCEDURE — 94761 N-INVAS EAR/PLS OXIMETRY MLT: CPT

## 2019-03-13 PROCEDURE — 97116 GAIT TRAINING THERAPY: CPT

## 2019-03-13 PROCEDURE — 0W993ZZ DRAINAGE OF RIGHT PLEURAL CAVITY, PERCUTANEOUS APPROACH: ICD-10-PCS | Performed by: INTERNAL MEDICINE

## 2019-03-13 PROCEDURE — 6370000000 HC RX 637 (ALT 250 FOR IP): Performed by: INTERNAL MEDICINE

## 2019-03-13 PROCEDURE — 80048 BASIC METABOLIC PNL TOTAL CA: CPT

## 2019-03-13 PROCEDURE — 6360000002 HC RX W HCPCS: Performed by: INTERNAL MEDICINE

## 2019-03-13 PROCEDURE — 36415 COLL VENOUS BLD VENIPUNCTURE: CPT

## 2019-03-13 PROCEDURE — C1729 CATH, DRAINAGE: HCPCS

## 2019-03-13 PROCEDURE — 2580000003 HC RX 258: Performed by: SURGERY

## 2019-03-13 PROCEDURE — 82962 GLUCOSE BLOOD TEST: CPT

## 2019-03-13 PROCEDURE — 76705 ECHO EXAM OF ABDOMEN: CPT

## 2019-03-13 PROCEDURE — 84439 ASSAY OF FREE THYROXINE: CPT

## 2019-03-13 PROCEDURE — 84443 ASSAY THYROID STIM HORMONE: CPT

## 2019-03-13 PROCEDURE — 85027 COMPLETE CBC AUTOMATED: CPT

## 2019-03-13 PROCEDURE — 97162 PT EVAL MOD COMPLEX 30 MIN: CPT

## 2019-03-13 RX ORDER — ADHESIVE BANDAGE
1 BANDAGE TOPICAL PRN
Refills: 0 | COMMUNITY
Start: 2019-02-27 | End: 2019-07-02

## 2019-03-13 RX ORDER — LACTOBACILLUS RHAMNOSUS GG 10B CELL
1 CAPSULE ORAL
Status: DISCONTINUED | OUTPATIENT
Start: 2019-03-13 | End: 2019-03-14 | Stop reason: HOSPADM

## 2019-03-13 RX ORDER — HYDROCODONE POLISTIREX AND CHLORPHENIRAMINE POLISTIREX 10; 8 MG/5ML; MG/5ML
5 SUSPENSION, EXTENDED RELEASE ORAL 2 TIMES DAILY
Status: DISCONTINUED | OUTPATIENT
Start: 2019-03-13 | End: 2019-03-14 | Stop reason: HOSPADM

## 2019-03-13 RX ORDER — IPRATROPIUM BROMIDE AND ALBUTEROL SULFATE 2.5; .5 MG/3ML; MG/3ML
1 SOLUTION RESPIRATORY (INHALATION)
Status: DISCONTINUED | OUTPATIENT
Start: 2019-03-13 | End: 2019-03-14 | Stop reason: HOSPADM

## 2019-03-13 RX ORDER — LIDOCAINE 40 MG/G
CREAM TOPICAL 3 TIMES DAILY
Status: DISCONTINUED | OUTPATIENT
Start: 2019-03-13 | End: 2019-03-14 | Stop reason: HOSPADM

## 2019-03-13 RX ADMIN — POLYETHYLENE GLYCOL 3350 17 G: 17 POWDER, FOR SOLUTION ORAL at 09:35

## 2019-03-13 RX ADMIN — GABAPENTIN 100 MG: 100 CAPSULE ORAL at 09:32

## 2019-03-13 RX ADMIN — LIDOCAINE: 40 CREAM TOPICAL at 21:49

## 2019-03-13 RX ADMIN — ASPIRIN 81 MG: 81 TABLET, COATED ORAL at 09:32

## 2019-03-13 RX ADMIN — Medication 5 ML: at 19:38

## 2019-03-13 RX ADMIN — SODIUM CHLORIDE: 4.5 INJECTION, SOLUTION INTRAVENOUS at 13:58

## 2019-03-13 RX ADMIN — Medication 1 CAPSULE: at 09:38

## 2019-03-13 RX ADMIN — LIDOCAINE: 40 CREAM TOPICAL at 13:55

## 2019-03-13 RX ADMIN — ENOXAPARIN SODIUM 30 MG: 30 INJECTION SUBCUTANEOUS at 09:34

## 2019-03-13 RX ADMIN — NORTRIPTYLINE HYDROCHLORIDE 25 MG: 25 CAPSULE ORAL at 19:39

## 2019-03-13 RX ADMIN — SENNOSIDES 17.2 MG: 8.6 TABLET ORAL at 19:39

## 2019-03-13 RX ADMIN — DOCUSATE SODIUM 100 MG: 100 CAPSULE, LIQUID FILLED ORAL at 09:32

## 2019-03-13 RX ADMIN — DONEPEZIL HYDROCHLORIDE 5 MG: 5 TABLET, FILM COATED ORAL at 19:39

## 2019-03-13 RX ADMIN — POLYETHYLENE GLYCOL 3350 17 G: 17 POWDER, FOR SOLUTION ORAL at 19:38

## 2019-03-13 RX ADMIN — GABAPENTIN 100 MG: 100 CAPSULE ORAL at 19:39

## 2019-03-13 RX ADMIN — LIDOCAINE: 40 CREAM TOPICAL at 12:04

## 2019-03-13 RX ADMIN — GABAPENTIN 100 MG: 100 CAPSULE ORAL at 13:54

## 2019-03-13 RX ADMIN — ATORVASTATIN CALCIUM 20 MG: 20 TABLET, FILM COATED ORAL at 19:39

## 2019-03-13 RX ADMIN — SENNOSIDES 17.2 MG: 8.6 TABLET ORAL at 09:32

## 2019-03-13 RX ADMIN — DOCUSATE SODIUM 100 MG: 100 CAPSULE, LIQUID FILLED ORAL at 19:39

## 2019-03-13 RX ADMIN — IPRATROPIUM BROMIDE AND ALBUTEROL SULFATE 1 AMPULE: .5; 3 SOLUTION RESPIRATORY (INHALATION) at 21:55

## 2019-03-13 RX ADMIN — IPRATROPIUM BROMIDE AND ALBUTEROL SULFATE 1 AMPULE: .5; 3 SOLUTION RESPIRATORY (INHALATION) at 11:56

## 2019-03-13 RX ADMIN — TAMSULOSIN HYDROCHLORIDE 0.4 MG: 0.4 CAPSULE ORAL at 09:32

## 2019-03-13 RX ADMIN — LEVOTHYROXINE SODIUM 50 MCG: 50 TABLET ORAL at 09:32

## 2019-03-13 RX ADMIN — Medication 5 ML: at 13:54

## 2019-03-13 RX ADMIN — LINAGLIPTIN 5 MG: 5 TABLET, FILM COATED ORAL at 09:32

## 2019-03-13 RX ADMIN — PIOGLITAZONE 45 MG: 45 TABLET ORAL at 09:32

## 2019-03-13 ASSESSMENT — PAIN DESCRIPTION - ONSET
ONSET: ON-GOING
ONSET: ON-GOING

## 2019-03-13 ASSESSMENT — PAIN SCALES - GENERAL
PAINLEVEL_OUTOF10: 0
PAINLEVEL_OUTOF10: 8
PAINLEVEL_OUTOF10: 6
PAINLEVEL_OUTOF10: 0

## 2019-03-13 ASSESSMENT — PAIN DESCRIPTION - DESCRIPTORS
DESCRIPTORS: ACHING;SORE
DESCRIPTORS: ACHING;SORE

## 2019-03-13 ASSESSMENT — PAIN DESCRIPTION - ORIENTATION
ORIENTATION: LEFT
ORIENTATION: LEFT

## 2019-03-13 ASSESSMENT — PAIN DESCRIPTION - LOCATION
LOCATION: ABDOMEN
LOCATION: ABDOMEN

## 2019-03-13 ASSESSMENT — PAIN DESCRIPTION - PAIN TYPE
TYPE: CHRONIC PAIN
TYPE: CHRONIC PAIN;ACUTE PAIN

## 2019-03-13 ASSESSMENT — PAIN DESCRIPTION - PROGRESSION: CLINICAL_PROGRESSION: NOT CHANGED

## 2019-03-13 ASSESSMENT — PAIN DESCRIPTION - FREQUENCY
FREQUENCY: CONTINUOUS
FREQUENCY: CONTINUOUS

## 2019-03-14 ENCOUNTER — TELEPHONE (OUTPATIENT)
Dept: CARDIOLOGY CLINIC | Age: 79
End: 2019-03-14

## 2019-03-14 ENCOUNTER — APPOINTMENT (OUTPATIENT)
Dept: INTERVENTIONAL RADIOLOGY/VASCULAR | Age: 79
DRG: 187 | End: 2019-03-14
Attending: SURGERY
Payer: MEDICARE

## 2019-03-14 ENCOUNTER — APPOINTMENT (OUTPATIENT)
Dept: GENERAL RADIOLOGY | Age: 79
DRG: 187 | End: 2019-03-14
Attending: SURGERY
Payer: MEDICARE

## 2019-03-14 VITALS
WEIGHT: 198 LBS | TEMPERATURE: 98.7 F | HEART RATE: 76 BPM | SYSTOLIC BLOOD PRESSURE: 100 MMHG | DIASTOLIC BLOOD PRESSURE: 55 MMHG | OXYGEN SATURATION: 94 % | BODY MASS INDEX: 26.82 KG/M2 | HEIGHT: 72 IN | RESPIRATION RATE: 18 BRPM

## 2019-03-14 PROBLEM — R62.51 FAILURE TO THRIVE (0-17): Status: ACTIVE | Noted: 2019-03-14

## 2019-03-14 PROBLEM — K59.00 CONSTIPATION: Status: ACTIVE | Noted: 2019-03-14

## 2019-03-14 PROBLEM — J90 PLEURAL EFFUSION ON LEFT: Status: ACTIVE | Noted: 2019-03-14

## 2019-03-14 LAB
FLUID TYPE: NORMAL INDEX
GLUCOSE BLD-MCNC: 123 MG/DL (ref 70–99)
GLUCOSE, FLUID: 137 MG/DL
LACTATE DEHYDROGENASE, FLUID: 147 IU/L
LYMPHOCYTES, BODY FLUID: 90 %
MESOTHELIAL FLUID: NORMAL /100 WBC
MONOCYTE, FLUID: 8 %
NEUTROPHIL, FLUID: 1 %
OTHER CELLS FLUID: NORMAL
PH FLUID: 8
PROTEIN FLUID: 4.5 GM/DL
RBC FLUID: 9000 /CU MM
WBC FLUID: 6445 /CU MM

## 2019-03-14 PROCEDURE — 94150 VITAL CAPACITY TEST: CPT

## 2019-03-14 PROCEDURE — 94761 N-INVAS EAR/PLS OXIMETRY MLT: CPT

## 2019-03-14 PROCEDURE — 6360000002 HC RX W HCPCS: Performed by: INTERNAL MEDICINE

## 2019-03-14 PROCEDURE — 88305 TISSUE EXAM BY PATHOLOGIST: CPT

## 2019-03-14 PROCEDURE — 2709999900 HC NON-CHARGEABLE SUPPLY

## 2019-03-14 PROCEDURE — 2580000003 HC RX 258: Performed by: SURGERY

## 2019-03-14 PROCEDURE — 6370000000 HC RX 637 (ALT 250 FOR IP): Performed by: INTERNAL MEDICINE

## 2019-03-14 PROCEDURE — 97166 OT EVAL MOD COMPLEX 45 MIN: CPT

## 2019-03-14 PROCEDURE — C1729 CATH, DRAINAGE: HCPCS

## 2019-03-14 PROCEDURE — 97530 THERAPEUTIC ACTIVITIES: CPT

## 2019-03-14 PROCEDURE — 6370000000 HC RX 637 (ALT 250 FOR IP): Performed by: PHYSICIAN ASSISTANT

## 2019-03-14 PROCEDURE — 82962 GLUCOSE BLOOD TEST: CPT

## 2019-03-14 PROCEDURE — 84157 ASSAY OF PROTEIN OTHER: CPT

## 2019-03-14 PROCEDURE — 82945 GLUCOSE OTHER FLUID: CPT

## 2019-03-14 PROCEDURE — 6370000000 HC RX 637 (ALT 250 FOR IP): Performed by: NURSE PRACTITIONER

## 2019-03-14 PROCEDURE — 83986 ASSAY PH BODY FLUID NOS: CPT

## 2019-03-14 PROCEDURE — 94640 AIRWAY INHALATION TREATMENT: CPT

## 2019-03-14 PROCEDURE — 87071 CULTURE AEROBIC QUANT OTHER: CPT

## 2019-03-14 PROCEDURE — 88108 CYTOPATH CONCENTRATE TECH: CPT

## 2019-03-14 PROCEDURE — 89051 BODY FLUID CELL COUNT: CPT

## 2019-03-14 PROCEDURE — 87205 SMEAR GRAM STAIN: CPT

## 2019-03-14 PROCEDURE — 83615 LACTATE (LD) (LDH) ENZYME: CPT

## 2019-03-14 PROCEDURE — 71045 X-RAY EXAM CHEST 1 VIEW: CPT

## 2019-03-14 PROCEDURE — 32555 ASPIRATE PLEURA W/ IMAGING: CPT

## 2019-03-14 PROCEDURE — 87073 CULTURE BACTERIA ANAEROBIC: CPT

## 2019-03-14 RX ORDER — PSEUDOEPHEDRINE HCL 30 MG
100 TABLET ORAL 2 TIMES DAILY
Qty: 60 CAPSULE | Refills: 5 | Status: SHIPPED | OUTPATIENT
Start: 2019-03-14 | End: 2020-10-06

## 2019-03-14 RX ORDER — GABAPENTIN 100 MG/1
100 CAPSULE ORAL 3 TIMES DAILY
Qty: 90 CAPSULE | Refills: 1 | Status: SHIPPED | OUTPATIENT
Start: 2019-03-14 | End: 2019-03-29 | Stop reason: ALTCHOICE

## 2019-03-14 RX ORDER — SENNA PLUS 8.6 MG/1
2 TABLET ORAL 2 TIMES DAILY
Qty: 120 TABLET | Refills: 0 | Status: SHIPPED | OUTPATIENT
Start: 2019-03-14 | End: 2019-04-13

## 2019-03-14 RX ORDER — LIDOCAINE 40 MG/G
CREAM TOPICAL
Qty: 1 TUBE | Refills: 2 | Status: SHIPPED | OUTPATIENT
Start: 2019-03-14 | End: 2019-07-02

## 2019-03-14 RX ORDER — IPRATROPIUM BROMIDE AND ALBUTEROL SULFATE 2.5; .5 MG/3ML; MG/3ML
3 SOLUTION RESPIRATORY (INHALATION)
Qty: 360 ML | Refills: 1 | Status: SHIPPED | OUTPATIENT
Start: 2019-03-14 | End: 2019-03-29

## 2019-03-14 RX ORDER — NORTRIPTYLINE HYDROCHLORIDE 25 MG/1
25 CAPSULE ORAL NIGHTLY
Qty: 30 CAPSULE | Refills: 3 | Status: SHIPPED | OUTPATIENT
Start: 2019-03-14 | End: 2019-03-29 | Stop reason: SINTOL

## 2019-03-14 RX ORDER — LACTOBACILLUS RHAMNOSUS GG 10B CELL
1 CAPSULE ORAL
Qty: 30 CAPSULE | Refills: 3 | Status: SHIPPED | OUTPATIENT
Start: 2019-03-15 | End: 2019-07-02

## 2019-03-14 RX ADMIN — IPRATROPIUM BROMIDE AND ALBUTEROL SULFATE 1 AMPULE: .5; 3 SOLUTION RESPIRATORY (INHALATION) at 12:25

## 2019-03-14 RX ADMIN — DOCUSATE SODIUM 100 MG: 100 CAPSULE, LIQUID FILLED ORAL at 10:17

## 2019-03-14 RX ADMIN — PIOGLITAZONE 45 MG: 45 TABLET ORAL at 10:26

## 2019-03-14 RX ADMIN — SODIUM CHLORIDE: 4.5 INJECTION, SOLUTION INTRAVENOUS at 13:33

## 2019-03-14 RX ADMIN — LEVOTHYROXINE SODIUM 50 MCG: 50 TABLET ORAL at 10:17

## 2019-03-14 RX ADMIN — LINAGLIPTIN 5 MG: 5 TABLET, FILM COATED ORAL at 10:17

## 2019-03-14 RX ADMIN — IPRATROPIUM BROMIDE AND ALBUTEROL SULFATE 1 AMPULE: .5; 3 SOLUTION RESPIRATORY (INHALATION) at 08:54

## 2019-03-14 RX ADMIN — POLYETHYLENE GLYCOL 3350 17 G: 17 POWDER, FOR SOLUTION ORAL at 10:16

## 2019-03-14 RX ADMIN — GABAPENTIN 100 MG: 100 CAPSULE ORAL at 10:17

## 2019-03-14 RX ADMIN — Medication 5 ML: at 10:16

## 2019-03-14 RX ADMIN — LIDOCAINE: 40 CREAM TOPICAL at 10:19

## 2019-03-14 RX ADMIN — Medication 1 CAPSULE: at 10:16

## 2019-03-14 RX ADMIN — SENNOSIDES 17.2 MG: 8.6 TABLET ORAL at 10:17

## 2019-03-14 RX ADMIN — GABAPENTIN 100 MG: 100 CAPSULE ORAL at 13:33

## 2019-03-14 RX ADMIN — TAMSULOSIN HYDROCHLORIDE 0.4 MG: 0.4 CAPSULE ORAL at 10:17

## 2019-03-14 RX ADMIN — ASPIRIN 81 MG: 81 TABLET, COATED ORAL at 10:17

## 2019-03-14 ASSESSMENT — PAIN SCALES - GENERAL
PAINLEVEL_OUTOF10: 0

## 2019-03-17 LAB
CULTURE: ABNORMAL
GRAM SMEAR: ABNORMAL
Lab: ABNORMAL
SPECIMEN: ABNORMAL

## 2019-03-22 ENCOUNTER — TELEPHONE (OUTPATIENT)
Dept: FAMILY MEDICINE CLINIC | Age: 79
End: 2019-03-22

## 2019-03-22 DIAGNOSIS — R53.1 WEAKNESS GENERALIZED: Primary | ICD-10-CM

## 2019-03-26 DIAGNOSIS — E03.9 ACQUIRED HYPOTHYROIDISM: ICD-10-CM

## 2019-03-26 RX ORDER — LEVOTHYROXINE SODIUM 0.05 MG/1
50 TABLET ORAL DAILY
Qty: 90 TABLET | Refills: 1 | Status: SHIPPED | OUTPATIENT
Start: 2019-03-26

## 2019-03-29 ENCOUNTER — OFFICE VISIT (OUTPATIENT)
Dept: FAMILY MEDICINE CLINIC | Age: 79
End: 2019-03-29
Payer: MEDICARE

## 2019-03-29 VITALS
BODY MASS INDEX: 27.26 KG/M2 | HEART RATE: 122 BPM | WEIGHT: 201 LBS | OXYGEN SATURATION: 95 % | DIASTOLIC BLOOD PRESSURE: 72 MMHG | SYSTOLIC BLOOD PRESSURE: 120 MMHG | TEMPERATURE: 95.9 F

## 2019-03-29 DIAGNOSIS — J18.9 PNEUMONIA OF BOTH LUNGS DUE TO INFECTIOUS ORGANISM, UNSPECIFIED PART OF LUNG: ICD-10-CM

## 2019-03-29 DIAGNOSIS — F03.90 DEMENTIA WITHOUT BEHAVIORAL DISTURBANCE, UNSPECIFIED DEMENTIA TYPE: ICD-10-CM

## 2019-03-29 DIAGNOSIS — C34.02 LUNG CANCER, MAIN BRONCHUS, LEFT (HCC): ICD-10-CM

## 2019-03-29 DIAGNOSIS — F41.9 ANXIETY: ICD-10-CM

## 2019-03-29 DIAGNOSIS — R62.51 FAILURE TO THRIVE (0-17): Primary | ICD-10-CM

## 2019-03-29 DIAGNOSIS — J90 PLEURAL EFFUSION ON LEFT: ICD-10-CM

## 2019-03-29 DIAGNOSIS — E11.69 HYPERLIPIDEMIA ASSOCIATED WITH TYPE 2 DIABETES MELLITUS (HCC): ICD-10-CM

## 2019-03-29 DIAGNOSIS — E11.9 TYPE 2 DIABETES MELLITUS WITHOUT COMPLICATION, WITHOUT LONG-TERM CURRENT USE OF INSULIN (HCC): ICD-10-CM

## 2019-03-29 DIAGNOSIS — J06.9 VIRAL URI: ICD-10-CM

## 2019-03-29 DIAGNOSIS — E78.5 HYPERLIPIDEMIA ASSOCIATED WITH TYPE 2 DIABETES MELLITUS (HCC): ICD-10-CM

## 2019-03-29 DIAGNOSIS — Z09 HOSPITAL DISCHARGE FOLLOW-UP: ICD-10-CM

## 2019-03-29 DIAGNOSIS — K59.00 CONSTIPATION, UNSPECIFIED CONSTIPATION TYPE: ICD-10-CM

## 2019-03-29 DIAGNOSIS — R42 LIGHTHEADEDNESS: ICD-10-CM

## 2019-03-29 PROCEDURE — 1111F DSCHRG MED/CURRENT MED MERGE: CPT | Performed by: FAMILY MEDICINE

## 2019-03-29 PROCEDURE — 99215 OFFICE O/P EST HI 40 MIN: CPT | Performed by: FAMILY MEDICINE

## 2019-03-29 PROCEDURE — G8428 CUR MEDS NOT DOCUMENT: HCPCS | Performed by: FAMILY MEDICINE

## 2019-03-29 PROCEDURE — G8417 CALC BMI ABV UP PARAM F/U: HCPCS | Performed by: FAMILY MEDICINE

## 2019-03-29 PROCEDURE — G8482 FLU IMMUNIZE ORDER/ADMIN: HCPCS | Performed by: FAMILY MEDICINE

## 2019-03-29 PROCEDURE — 1123F ACP DISCUSS/DSCN MKR DOCD: CPT | Performed by: FAMILY MEDICINE

## 2019-03-29 PROCEDURE — G8598 ASA/ANTIPLAT THER USED: HCPCS | Performed by: FAMILY MEDICINE

## 2019-03-29 PROCEDURE — 4040F PNEUMOC VAC/ADMIN/RCVD: CPT | Performed by: FAMILY MEDICINE

## 2019-03-29 PROCEDURE — 1036F TOBACCO NON-USER: CPT | Performed by: FAMILY MEDICINE

## 2019-03-29 RX ORDER — DONEPEZIL HYDROCHLORIDE 10 MG/1
10 TABLET, FILM COATED ORAL NIGHTLY
Qty: 90 TABLET | Refills: 1 | Status: SHIPPED | OUTPATIENT
Start: 2019-03-29

## 2019-03-29 RX ORDER — ATORVASTATIN CALCIUM 20 MG/1
20 TABLET, FILM COATED ORAL DAILY
Qty: 90 TABLET | Refills: 1 | Status: SHIPPED | OUTPATIENT
Start: 2019-03-29

## 2019-03-29 RX ORDER — PIOGLITAZONEHYDROCHLORIDE 45 MG/1
45 TABLET ORAL DAILY
Qty: 90 TABLET | Refills: 1 | Status: SHIPPED | OUTPATIENT
Start: 2019-03-29 | End: 2021-08-18

## 2019-03-30 ASSESSMENT — PATIENT HEALTH QUESTIONNAIRE - PHQ9
SUM OF ALL RESPONSES TO PHQ9 QUESTIONS 1 & 2: 0
2. FEELING DOWN, DEPRESSED OR HOPELESS: 0
SUM OF ALL RESPONSES TO PHQ QUESTIONS 1-9: 0
SUM OF ALL RESPONSES TO PHQ QUESTIONS 1-9: 0
1. LITTLE INTEREST OR PLEASURE IN DOING THINGS: 0

## 2019-04-03 ENCOUNTER — HOSPITAL ENCOUNTER (OUTPATIENT)
Age: 79
Discharge: HOME | End: 2019-04-03
Payer: MEDICARE

## 2019-04-03 ENCOUNTER — HOSPITAL ENCOUNTER (OUTPATIENT)
Dept: GENERAL RADIOLOGY | Age: 79
Discharge: HOME | End: 2019-04-03
Payer: MEDICARE

## 2019-04-03 DIAGNOSIS — R91.8 ENDOBRONCHIAL MASS: ICD-10-CM

## 2019-04-03 PROCEDURE — 71045 X-RAY EXAM CHEST 1 VIEW: CPT

## 2019-04-08 ENCOUNTER — ANESTHESIA EVENT (OUTPATIENT)
Dept: ENDOSCOPY | Age: 79
End: 2019-04-08
Payer: MEDICARE

## 2019-04-08 ASSESSMENT — ENCOUNTER SYMPTOMS: SHORTNESS OF BREATH: 1

## 2019-04-08 NOTE — ANESTHESIA PRE PROCEDURE
Department of Anesthesiology  Preprocedure Note       Name:  Isabela Musa   Age:  69 y.o.  :  1940                                          MRN:  [id number]         Date:  2019      Surgeon: Negrito Velasquez):  Rory Robb MD    Procedure: COLONOSCOPY DIAGNOSTIC (N/A )    Medications prior to admission:   Prior to Admission medications    Medication Sig Start Date End Date Taking? Authorizing Provider   donepezil (ARICEPT) 10 MG tablet Take 1 tablet by mouth nightly 3/29/19   Isaak Flores MD   atorvastatin (LIPITOR) 20 MG tablet Take 1 tablet by mouth daily 3/29/19   Isaak Flores MD   pioglitazone (ACTOS) 45 MG tablet Take 1 tablet by mouth daily 3/29/19   Isaak Flores MD   sertraline (ZOLOFT) 50 MG tablet Take 1 tablet by mouth daily 3/29/19   Isaak Flores MD   SITagliptin (JANUVIA) 100 MG tablet Take 1 tablet by mouth daily 3/29/19   Isaak Flores MD   levothyroxine (SYNTHROID) 50 MCG tablet Take 1 tablet by mouth Daily 3/26/19   Isaak Flores MD   lactobacillus (CULTURELLE) capsule Take 1 capsule by mouth daily (with breakfast) 3/15/19   Mackenzie Viramontes PA-C   lidocaine (LMX) 4 % cream Apply topically as needed.  3/14/19   Mackenzie Viramontes PA-C   docusate sodium (COLACE, DULCOLAX) 100 MG CAPS Take 100 mg by mouth 2 times daily 3/14/19   Mackenzie Viramontes PA-C   senna (SENOKOT) 8.6 MG tablet Take 2 tablets by mouth 2 times daily 3/14/19 4/13/19  Mackenzie Viramontes PA-C   [employer] GAS RELIEF 80 MG chewable tablet Take 1 tablet by mouth as needed 19   Historical Provider, MD   polyethylene glycol (GLYCOLAX) powder Take 17 g by mouth 2 times daily 19   Historical Provider, MD   aspirin 81 MG EC tablet Take 81 mg by mouth every morning Over The Counter    Historical Provider, MD   Cholecalciferol (VITAMIN D3) 5000 units TABS Take by mouth every morning Over The Counter    Historical Provider, MD   Cyanocobalamin (VITAMIN B-12 PO) Take 2,500 mcg by mouth every morning Over The Counter    Historical Provider, MD   Naproxen Sod-Diphenhydramine (ALEVE PM PO) Take by mouth as needed Over The Counter    Historical Provider, MD       Current medications:    No current facility-administered medications for this encounter. Current Outpatient Medications   Medication Sig Dispense Refill    donepezil (ARICEPT) 10 MG tablet Take 1 tablet by mouth nightly 90 tablet 1    atorvastatin (LIPITOR) 20 MG tablet Take 1 tablet by mouth daily 90 tablet 1    pioglitazone (ACTOS) 45 MG tablet Take 1 tablet by mouth daily 90 tablet 1    sertraline (ZOLOFT) 50 MG tablet Take 1 tablet by mouth daily 90 tablet 1    SITagliptin (JANUVIA) 100 MG tablet Take 1 tablet by mouth daily 90 tablet 1    levothyroxine (SYNTHROID) 50 MCG tablet Take 1 tablet by mouth Daily 90 tablet 1    lactobacillus (CULTURELLE) capsule Take 1 capsule by mouth daily (with breakfast) 30 capsule 3    lidocaine (LMX) 4 % cream Apply topically as needed.  1 Tube 2    docusate sodium (COLACE, DULCOLAX) 100 MG CAPS Take 100 mg by mouth 2 times daily 60 capsule 5    senna (SENOKOT) 8.6 MG tablet Take 2 tablets by mouth 2 times daily 120 tablet 0    [employer] GAS RELIEF 80 MG chewable tablet Take 1 tablet by mouth as needed  0    polyethylene glycol (GLYCOLAX) powder Take 17 g by mouth 2 times daily  6    aspirin 81 MG EC tablet Take 81 mg by mouth every morning Over The Counter      Cholecalciferol (VITAMIN D3) 5000 units TABS Take by mouth every morning Over The Counter      Cyanocobalamin (VITAMIN B-12 PO) Take 2,500 mcg by mouth every morning Over The Counter      Naproxen Sod-Diphenhydramine (ALEVE PM PO) Take by mouth as needed Over The Counter         Allergies:  No Known Allergies    Problem List:    Patient Active Problem List   Diagnosis Code    Diabetes mellitus ([facility] [address] E11.9    MVP (mitral valve prolapse) I34.1    Fatigue R53.83    Chest pain R07.9    Ataxia R27.0    H/O pneumothorax Z87.09    Hyperlipidemia associated with type 2 diabetes mellitus ([facility] [address] E11.69, E78.5    Acquired hypothyroidism E03.9    ASCVD (arteriosclerotic cardiovascular disease) I25.10    Overweight (BMI 25.0-29. 9) E66.3    JOSE CRUZ (obstructive sleep apnea) G47.33    Excessive daytime sleepiness G47.19    SOB (shortness of breath) on exertion R06.02    Interstitial lung disease (HCC) J84.9    Endobronchial mass R91.8    Lung cancer, main bronchus, left (HCC) C34.02    Pneumonia J18.9    Constipation K59.00    Failure to thrive (0-17) R62.51    Pleural effusion on left J90       Past Medical History:        Diagnosis Date    Arthritis     \"Back, Knees\"    Back pain     \"At Times\"    CAD (coronary artery disease)     Diabetes mellitus ([facility] [address]     dx 5+ yrs ago-     Glaucoma     Bilateral Eyes    H/O 24 hour EKG monitoring 11-    Predominantly SR - avg rate of 69bpm. Lowest rate 46 bpm at 0622. Frequent isolated 5954 PVCs noted mostly in bigemenal pattern w/out complex arrhythmias. Five beat run of atrial tachycardia at 1228. No palpitations or dizziness reported in patient's daily activity report. (12-, )    H/O cardiac catheterization 06-    Noncritical diffuse CAD w/no critical stenosis. Diag borderline significant stenosis, not large enough fo percutaneous intervention. No significant angiographic progression of atherosclerosis since cath in 1997.   ( per Dr. Audrey Morales at [facility]. Preserved vent function. MVP. CAD w/normal LM, 30-40% stenosis LAD, 70-80% stenosis of ostium & prox seg diag branch, normal left CX & RCA.)    H/O cardiovascular stress test 1/30/2013, 06- 1/30/2013-Normal study. Normal perfusion in all regions. EF 62%. 06--EF=60%. Normal. Normal pattern of perfusion. LV normal size. No wall abnormality. Exercise capacity good. (05-, 12-, 06-, , )    H/O chest pain     H/O chest x-ray 06-    Negative.  Dx was dyspnea and CAD.  (02-)    H/O dizziness     H/O Doppler ultrasound 11-    (Carotid) Intimal thickening but no significant atherosclerotic plaque noted in right or left ICA. Doppler flow velocities w/in right and left ICA WNL.  H/O echocardiogram 1/30/2013, 05-    1/30/2013- LVSF normal. EF 50-55%. Impaired LV relaxation. Trace MR. Trace TR;   5- Normal LV size and systolic function. EF=60%. Chamber dimensions WNL. Mild concentric LV hypertrophy. Valves appear structurally normal.-OICC       H/O gastroesophageal reflux (GERD)     H/O pneumothorax Dx 1960    \"Both Sides\"    History of cardiac monitoring 12/03/2018    9 beat run of SVT, no other arrhythmias noted, primarily sinus rythym    History of complete ECG 06/06/2011    (06-, 07-, 06-, 06-)    History of stress test 11/07/2018    EF 61%, Normal    [place] (hard of hearing)     Bilateral Hearing Aids    Hx of CT scan of chest 11/12/2018    3 mm indeterminate solid nonobstructing endobronchial nodule of the proximal left upper lobe bronchus. Further evaluation with endoscopy is recommended. Bilateral basilar predominant intersitial changes suggestive of nonspecific interstitial pneumonitis.     Hx of Doppler echocardiogram 11/20/2018    EF55-60%,no valvular disease    Hyperlipidemia     Hypertension     Left Lung Cancer Dx 11-18    LEFT UPPER LOBECTOMY 1/28/19    Lung nodule     \"they say I have a spot on my lung- and mass in left lung so thats why doing the bronch\"( 12/7/2018)    Memory loss     MVP (mitral valve prolapse)     follows with Dr Agustín Tolliver (myocardial infarction)     \"had heart attack 30 yrs ago a mild one\"    Shortness of breath on exertion     Teeth missing     Upper And Lower    Thyroid disease     Wears dentures     Full Upper    Wears glasses     Wears hearing aid     Bilateral Ears       Past Surgical History:        Procedure Laterality Date    BRONCHOSCOPY N/A 12/7/2018    BRONCHOSCOPY/TRANSBRONCHIAL LUNG BIOPSY performed by Tom Diaz MD at [address]  [address] Or 1990's    CHOLECYSTECTOMY, LAPAROSCOPIC  2002    COLONOSCOPY  Last Done In 2006    Polyps Removed In Past    DENTAL SURGERY      Teeth Extracted In Past    EYE SURGERY Bilateral 2000's    \"Laser For Glaucoma\"    KNEE ARTHROSCOPY Bilateral 1970's To 1990's    \"Numerous\"    KNEE SURGERY Left 1980's Or 1990's    LUNG REMOVAL, TOTAL Left 1/28/2019    THORACOTOMY LEFT UPPER LOBECTOMY ROBOTIC ASSISTED performed by Glenn Mcduffie MD at [address]  [address]       Social History:    Social History     Tobacco Use    Smoking status: Never Smoker    Smokeless tobacco: Never Used   Substance Use Topics    Alcohol use: Yes     Comment: \"Maybe Once A Month\"                                Counseling given: Not Answered      Vital Signs (Current): There were no vitals filed for this visit.                                            BP Readings from Last 3 Encounters:   04/03/19 138/82   03/29/19 120/72   03/14/19 (!) 100/55       NPO Status:                                                                                 BMI:   Wt Readings from Last 3 Encounters:   04/03/19 197 lb (89.4 kg)   03/29/19 201 lb (91.2 kg)   03/14/19 198 lb (89.8 kg)     There is no height or weight on file to calculate BMI.    CBC:   Lab Results   Component Value Date    WBC 7.4 03/13/2019    RBC 4.03 03/13/2019    HGB 12.2 03/13/2019    HCT 39.0 03/13/2019    MCV 96.8 03/13/2019    RDW 13.2 03/13/2019     03/13/2019       CMP:   Lab Results   Component Value Date     03/13/2019    K 4.0 03/13/2019     03/13/2019    CO2 25 03/13/2019    BUN 16 03/13/2019    CREATININE 1.2 03/13/2019    GFRAA >60 03/13/2019    AGRATIO 1.8 11/02/2018    LABGLOM 59 03/13/2019    LABGLOM 53 11/02/2018    GLUCOSE 139 03/13/2019    PROT 6.7 03/12/2019    CALCIUM 8.6 03/13/2019    BILITOT 0.5 03/12/2019    ALKPHOS 64 03/12/2019    AST 19 03/12/2019    ALT 12 03/12/2019       POC Tests: No results for input(s): POCGLU, POCNA, POCK, POCCL, POCBUN, POCHEMO, POCHCT in the last 72 hours. Coags:   Lab Results   Component Value Date    PROTIME 12.3 02/24/2019    INR 1.08 02/24/2019    APTT 34.5 01/21/2019       HCG (If Applicable): No results found for: PREGTESTUR, PREGSERUM, HCG, HCGQUANT     ABGs: No results found for: PHART, PO2ART, KCM0SJJ, OZK7OOQ, BEART, C3AXAAOV     Type & Screen (If Applicable):  No results found for: LABABO, [address]    Anesthesia Evaluation  Patient summary reviewed and Nursing notes reviewed no history of anesthetic complications:   Airway: Mallampati: II  TM distance: >3 FB   Neck ROM: full  Mouth opening: > = 3 FB Dental:    (+) edentulous  Comment: Uppers edentulous, lower poor dentition with missing teeth. Denies loose/chipped/cracked teeth    Pulmonary: breath sounds clear to auscultation  (+) pneumonia: resolved,  shortness of breath: chronic,  sleep apnea:  decreased breath sounds,      (-) not a current smoker                          PE comment: States becomes easily short of breath. S/p lung resection in January Cardiovascular:  Exercise tolerance: poor (<4 METS),   (+) hypertension:, past MI: > 6 months, CAD:, ALVES ( With minimal activity): after ambulating 1 flight of stairs,       ECG reviewed  Rhythm: regular  Rate: normal  Echocardiogram reviewed         Beta Blocker:  Not on Beta Blocker      ROS comment: Summary   Normal left ventricle structure and systolic function with an ejection   fraction of 55-60%.    Grade I diastolic dysfunction.   No significant valvular disease noted.   No evidence of pericardial effusion.   Essentially unremarkable echo.      Signature      ------------------------------------------------------------------   Electronically signed by Clinton Colon MD (Interpreting   [id number]) on 11/20/2018 at 06:38 PM   ------------------------------------------------------------------    HR 66 Sinus rhythm with short SD   Minimal voltage criteria for LVH, may be normal variant   Borderline ECG   When compared with ECG of 21-JAN-2019 11:04,   SD interval has decreased   Confirmed by [facility] MD, Vimal Pat ([id number]) on 2/24/2019 4:34:40 PM     Neuro/Psych:   Negative Neuro/Psych ROS              GI/Hepatic/Renal:   (+) bowel prep,           Endo/Other:    (+) DiabetesType II DM, , hypothyroidism::., malignancy/cancer ( lung cancer). Abdominal:   (+) obese,         Vascular: negative vascular ROS. Anesthesia Plan      MAC and TIVA     ASA 4       Induction: intravenous. Anesthetic plan and risks discussed with patient. Plan discussed with CRNA.     Attending anesthesiologist reviewed and agrees with Pre Eval content            ASHLEE Gardner - CRNA   4/8/2019

## 2019-04-09 ENCOUNTER — HOSPITAL ENCOUNTER (OUTPATIENT)
Age: 79
Setting detail: OUTPATIENT SURGERY
Discharge: HOME | End: 2019-04-09
Attending: INTERNAL MEDICINE | Admitting: INTERNAL MEDICINE
Payer: MEDICARE

## 2019-04-09 ENCOUNTER — ANESTHESIA (OUTPATIENT)
Dept: ENDOSCOPY | Age: 79
End: 2019-04-09
Payer: MEDICARE

## 2019-04-09 VITALS
WEIGHT: 197 LBS | BODY MASS INDEX: 26.68 KG/M2 | OXYGEN SATURATION: 96 % | TEMPERATURE: 97.2 F | SYSTOLIC BLOOD PRESSURE: 124 MMHG | HEIGHT: 72 IN | HEART RATE: 70 BPM | RESPIRATION RATE: 16 BRPM | DIASTOLIC BLOOD PRESSURE: 71 MMHG

## 2019-04-09 VITALS — SYSTOLIC BLOOD PRESSURE: 110 MMHG | OXYGEN SATURATION: 94 % | DIASTOLIC BLOOD PRESSURE: 58 MMHG

## 2019-04-09 LAB — GLUCOSE BLD-MCNC: 128 MG/DL (ref 70–99)

## 2019-04-09 PROCEDURE — 7100000010 HC PHASE II RECOVERY - FIRST 15 MIN: Performed by: INTERNAL MEDICINE

## 2019-04-09 PROCEDURE — 3700000001 HC ADD 15 MINUTES (ANESTHESIA): Performed by: INTERNAL MEDICINE

## 2019-04-09 PROCEDURE — 7100000011 HC PHASE II RECOVERY - ADDTL 15 MIN: Performed by: INTERNAL MEDICINE

## 2019-04-09 PROCEDURE — 82962 GLUCOSE BLOOD TEST: CPT

## 2019-04-09 PROCEDURE — 2500000003 HC RX 250 WO HCPCS: Performed by: NURSE ANESTHETIST, CERTIFIED REGISTERED

## 2019-04-09 PROCEDURE — 2709999900 HC NON-CHARGEABLE SUPPLY: Performed by: INTERNAL MEDICINE

## 2019-04-09 PROCEDURE — 3609009500 HC COLONOSCOPY DIAGNOSTIC OR SCREENING: Performed by: INTERNAL MEDICINE

## 2019-04-09 PROCEDURE — 3700000000 HC ANESTHESIA ATTENDED CARE: Performed by: INTERNAL MEDICINE

## 2019-04-09 PROCEDURE — 6360000002 HC RX W HCPCS: Performed by: NURSE ANESTHETIST, CERTIFIED REGISTERED

## 2019-04-09 PROCEDURE — 2580000003 HC RX 258

## 2019-04-09 RX ORDER — PROPOFOL 10 MG/ML
INJECTION, EMULSION INTRAVENOUS PRN
Status: DISCONTINUED | OUTPATIENT
Start: 2019-04-09 | End: 2019-04-09 | Stop reason: SDUPTHER

## 2019-04-09 RX ORDER — SODIUM CHLORIDE, SODIUM LACTATE, POTASSIUM CHLORIDE, CALCIUM CHLORIDE 600; 310; 30; 20 MG/100ML; MG/100ML; MG/100ML; MG/100ML
INJECTION, SOLUTION INTRAVENOUS CONTINUOUS
Status: DISCONTINUED | OUTPATIENT
Start: 2019-04-09 | End: 2019-04-09 | Stop reason: HOSPADM

## 2019-04-09 RX ORDER — SODIUM CHLORIDE, SODIUM LACTATE, POTASSIUM CHLORIDE, CALCIUM CHLORIDE 600; 310; 30; 20 MG/100ML; MG/100ML; MG/100ML; MG/100ML
INJECTION, SOLUTION INTRAVENOUS
Status: COMPLETED
Start: 2019-04-09 | End: 2019-04-09

## 2019-04-09 RX ORDER — LIDOCAINE HYDROCHLORIDE 20 MG/ML
INJECTION, SOLUTION INFILTRATION; PERINEURAL PRN
Status: DISCONTINUED | OUTPATIENT
Start: 2019-04-09 | End: 2019-04-09 | Stop reason: SDUPTHER

## 2019-04-09 RX ADMIN — LIDOCAINE HYDROCHLORIDE 100 MG: 20 INJECTION, SOLUTION INFILTRATION; PERINEURAL at 10:35

## 2019-04-09 RX ADMIN — SODIUM CHLORIDE, POTASSIUM CHLORIDE, SODIUM LACTATE AND CALCIUM CHLORIDE: 600; 310; 30; 20 INJECTION, SOLUTION INTRAVENOUS at 08:38

## 2019-04-09 RX ADMIN — PROPOFOL 20 MG: 10 INJECTION, EMULSION INTRAVENOUS at 10:40

## 2019-04-09 RX ADMIN — SODIUM CHLORIDE, SODIUM LACTATE, POTASSIUM CHLORIDE, CALCIUM CHLORIDE: 600; 310; 30; 20 INJECTION, SOLUTION INTRAVENOUS at 08:38

## 2019-04-09 RX ADMIN — PROPOFOL 20 MG: 10 INJECTION, EMULSION INTRAVENOUS at 10:42

## 2019-04-09 RX ADMIN — PROPOFOL 20 MG: 10 INJECTION, EMULSION INTRAVENOUS at 10:44

## 2019-04-09 RX ADMIN — PROPOFOL 20 MG: 10 INJECTION, EMULSION INTRAVENOUS at 10:47

## 2019-04-09 RX ADMIN — PROPOFOL 30 MG: 10 INJECTION, EMULSION INTRAVENOUS at 10:49

## 2019-04-09 RX ADMIN — PROPOFOL 20 MG: 10 INJECTION, EMULSION INTRAVENOUS at 10:38

## 2019-04-09 RX ADMIN — PROPOFOL 50 MG: 10 INJECTION, EMULSION INTRAVENOUS at 10:35

## 2019-04-09 ASSESSMENT — ENCOUNTER SYMPTOMS: DYSPNEA ACTIVITY LEVEL: AFTER AMBULATING 1 FLIGHT OF STAIRS

## 2019-04-09 ASSESSMENT — LIFESTYLE VARIABLES: SMOKING_STATUS: 0

## 2019-04-09 NOTE — PROGRESS NOTES
Patient discharge instructions  reviewed and verified. RN reviewed d/c instructions with patient and spouse. All questions answered. Discharge paperwork signed by RN and patients spouse. Armband removed.

## 2019-04-09 NOTE — H&P
[facility]   Pre-operative History and Physical    Patient: Joey Britton  : 1940      History Obtained From:  patient        HISTORY OF PRESENT ILLNESS:                The patient is a 69 y.o. male with significant past medical history as below who presents for C scope    Indication constipation, change in bowel habit    Past Medical History:        Diagnosis Date    Arthritis     \"Back, Knees\"    Back pain     \"At Times\"    CAD (coronary artery disease)     Diabetes mellitus ([address]     dx 5+ yrs ago-     Glaucoma     Bilateral Eyes    H/O 24 hour EKG monitoring 2005    Predominantly SR - avg rate of 69bpm. Lowest rate 46 bpm at 0622. Frequent isolated 5954 PVCs noted mostly in bigemenal pattern w/out complex arrhythmias. Five beat run of atrial tachycardia at 1228. No palpitations or dizziness reported in patient's daily activity report. (2000, )    H/O cardiac catheterization 2006    Noncritical diffuse CAD w/no critical stenosis. Diag borderline significant stenosis, not large enough fo percutaneous intervention. No significant angiographic progression of atherosclerosis since cath in .   ( per Dr. Ysabel Weir at [facility]. Preserved vent function. MVP. CAD w/normal LM, 30-40% stenosis LAD, 70-80% stenosis of ostium & prox seg diag branch, normal left CX & RCA.)    H/O cardiovascular stress test 2013, 2011-Normal study. Normal perfusion in all regions. EF 62%. 2011-EF=60%. Normal. Normal pattern of perfusion. LV normal size. No wall abnormality. Exercise capacity good. (2009, 2005, 2003, 4960, )    H/O chest pain     H/O chest x-ray 2006    Negative. Dx was dyspnea and CAD.  (2001)    H/O dizziness     H/O Doppler ultrasound 2005    (Carotid) Intimal thickening but no significant atherosclerotic plaque noted in right or left ICA.  Doppler flow velocities w/in right and left ICA WNL.  H/O echocardiogram 1/30/2013, 05-    1/30/2013- LVSF normal. EF 50-55%. Impaired LV relaxation. Trace MR. Trace TR;   5- Normal LV size and systolic function. EF=60%. Chamber dimensions WNL. Mild concentric LV hypertrophy. Valves appear structurally normal.-OICC       H/O gastroesophageal reflux (GERD)     H/O pneumothorax Dx 1960    \"Both Sides\"    History of cardiac monitoring 12/03/2018    9 beat run of SVT, no other arrhythmias noted, primarily sinus rythym    History of complete ECG 06/06/2011    (06-, 07-, 06-, 06-)    History of stress test 11/07/2018    EF 61%, Normal    [place] (hard of hearing)     Bilateral Hearing Aids    Hx of CT scan of chest 11/12/2018    3 mm indeterminate solid nonobstructing endobronchial nodule of the proximal left upper lobe bronchus. Further evaluation with endoscopy is recommended. Bilateral basilar predominant intersitial changes suggestive of nonspecific interstitial pneumonitis.     Hx of Doppler echocardiogram 11/20/2018    EF55-60%,no valvular disease    Hyperlipidemia     Hypertension     Left Lung Cancer Dx 11-18    LEFT UPPER LOBECTOMY 1/28/19    Lung nodule     \"they say I have a spot on my lung- and mass in left lung so thats why doing the bronch\"( 12/7/2018)    Memory loss     MVP (mitral valve prolapse)     follows with Dr Mateo Noe (myocardial infarction)     \"had heart attack 30 yrs ago a mild one\"    Shortness of breath on exertion     Teeth missing     Upper And Lower    Thyroid disease     Wears dentures     Full Upper    Wears glasses     Wears hearing aid     Bilateral Ears       Past Surgical History:        Procedure Laterality Date    BRONCHOSCOPY N/A 12/7/2018    BRONCHOSCOPY/TRANSBRONCHIAL LUNG BIOPSY performed by Itzel Fang MD at [address]  [address] Or 1990's    CHOLECYSTECTOMY, LAPAROSCOPIC  2002    COLONOSCOPY  Last Done In 2006    Polyps Removed In Past    DENTAL SURGERY      Teeth Extracted In Past    EYE SURGERY Bilateral 2000's    \"Laser For Glaucoma\"    KNEE ARTHROSCOPY Bilateral 1970's To 1990's    \"Numerous\"    KNEE SURGERY Left 1980's Or 1990's    LUNG REMOVAL, TOTAL Left 1/28/2019    THORACOTOMY LEFT UPPER LOBECTOMY ROBOTIC ASSISTED performed by Luzma Grove MD at [address]  1980's         Current Medications:    Medications    Scheduled Medications:   PRN Medications:   No current facility-administered medications on file prior to encounter. Current Outpatient Medications on File Prior to Encounter   Medication Sig Dispense Refill    aspirin 81 MG EC tablet Take 81 mg by mouth every morning Over The Counter      Cholecalciferol (VITAMIN D3) 5000 units TABS Take by mouth every morning Over The Counter      Cyanocobalamin (VITAMIN B-12 PO) Take 2,500 mcg by mouth every morning Over The Counter      Naproxen Sod-Diphenhydramine (ALEVE PM PO) Take by mouth as needed Over The Counter           Allergies:  Patient has no known allergies. Social History:   TOBACCO:   reports that he has never smoked. He has never used smokeless tobacco.  ETOH:   reports that he drinks alcohol.     Family History:       Problem Relation Age of Onset    Heart Disease Mother         Enlarged Heart    High Blood Pressure Mother     Heart Attack Father     Stroke Father     COPD Sister     Diabetes Brother        REVIEW OF SYSTEMS:    Negative except for HPI        PHYSICAL EXAM:      Vitals:    /70   Pulse 85   Temp 97 °F (36.1 °C) (Temporal)   Resp 19   Ht 6' (1.829 m)   Wt 197 lb (89.4 kg)   SpO2 93%   BMI 26.72 kg/m²     General Appearance:    Alert, cooperative, no distress, appears stated age   [de-identified]:    Anicteric, normocephalic, atraumatic   Neck:   Supple, symmetrical, trachea midline, no adenopathy;        Lungs:     Clear to auscultation bilaterally, respirations unlabored        Heart: Regular rate and rhythm, S1 and S2 normal, no murmur, rub   or gallop   Abdomen:     Soft, non-tender, bowel sounds active all four quadrants,     no masses, no organomegaly   Rectal:    Planned at time of C scope   Extremities:   Extremities normal, atraumatic, no cyanosis or edema       Skin:   Skin color, texture, turgor normal, no rashes or lesions       Neurologic:   No focal deficits, moving all four extremities      ASA Grade:  ASA 3 - Patient with moderate systemic disease with functional limitations  Air Way:    Prior Anesthesia complication: NILL    ASSESSMENT AND PLAN:    1. Patient is a 69 y.o. male here for colonoscopy with deep sedation  2. Procedure options, risks and benefits reviewed with patient. Patient expresses understanding.     Linda Baez  4/9/2019  10:22 AM

## 2019-04-09 NOTE — PROGRESS NOTES
Pre procedure checklist verified with pt including npo status, bowel prep and procedure. Pt states no allergies or blood thinners. Pt does not take a BB. Wife at bedside and support given. No questions voiced.

## 2019-04-09 NOTE — PROGRESS NOTES
Handoff report given in [facility] room 23 to [address] [facility]. Pt drowsy but arousable and denies pain. Wife remains at bedside. Vitals 110/58 89-16 97% on room air. Pt resting quietly in bed with calllight within reach.

## 2019-04-09 NOTE — PROGRESS NOTES
Patient returned to room from endoscopy. A+Ox4,  VSS (see doc flow), assessment completed as per doc flow. Patient has no c/o pain. Beverage offered. Call light in reach, bed in low position. RN to continue to monitor.  Family remains at bedside

## 2019-04-09 NOTE — ANESTHESIA POSTPROCEDURE EVALUATION
Department of Anesthesiology  Postprocedure Note    Patient: Mariann Mcintyre  MRN: [id number]  YOB: 1940  Date of evaluation: 4/9/2019  Time:  11:10 AM     Procedure Summary     Date:  04/09/19 Room / Location:  [facility] ENDO 01 / [facility] ENDOSCOPY    Anesthesia Start:  1030 Anesthesia Stop:  1106    Procedure:  COLONOSCOPY DIAGNOSTIC (N/A ) Diagnosis:  (CHANGE BOWEL HABITS)    Surgeon:  Aniket Lacey MD Responsible Provider:  Marquis Laine MD    Anesthesia Type:  MAC, TIVA ASA Status:  4          Anesthesia Type: MAC, TIVA    Marisela Phase I:      Marisela Phase II:      Last vitals: Reviewed and per EMR flowsheets.        Anesthesia Post Evaluation    Patient location during evaluation: bedside  Patient participation: complete - patient participated  Level of consciousness: awake and alert  Pain score: 0  Airway patency: patent  Nausea & Vomiting: no nausea and no vomiting  Complications: no  Cardiovascular status: blood pressure returned to baseline and hemodynamically stable  Respiratory status: acceptable, room air and spontaneous ventilation  Hydration status: euvolemic

## 2019-04-09 NOTE — OP NOTE
Full note in [facility]    Indication: constipation, change in bowel habits    Procedure: colonoscopy    Findings:  Medium internal hemorrhoids  Otherwise normal  Normal TI    EBL: none      Complications: none      Plan:  High fiber diet  No further colorectal cancer screening recommended  F/u prn

## 2019-04-17 ENCOUNTER — HOSPITAL ENCOUNTER (OUTPATIENT)
Dept: PULMONOLOGY | Age: 79
Discharge: HOME | End: 2019-04-17
Payer: MEDICARE

## 2019-04-17 LAB
DLCO %PRED: 31 %
DLCO PRED: NORMAL ML/MIN/MMHG
DLCO/VA %PRED: NORMAL %
DLCO/VA PRED: NORMAL ML/MIN/MMHG
DLCO/VA: NORMAL ML/MIN/MMHG
DLCO: NORMAL ML/MIN/MMHG
EXPIRATORY TIME-POST: NORMAL SEC
EXPIRATORY TIME: NORMAL SEC
FEF 25-75% %CHNG: NORMAL
FEF 25-75% %PRED-POST: NORMAL %
FEF 25-75% %PRED-PRE: NORMAL L/SEC
FEF 25-75% PRED: NORMAL L/SEC
FEF 25-75%-POST: NORMAL L/SEC
FEF 25-75%-PRE: NORMAL L/SEC
FEV1 %PRED-POST: 43 %
FEV1 %PRED-PRE: 35 %
FEV1 PRED: NORMAL L
FEV1-POST: NORMAL L
FEV1-PRE: NORMAL L
FEV1/FVC %PRED-POST: NORMAL %
FEV1/FVC %PRED-PRE: NORMAL %
FEV1/FVC PRED: NORMAL %
FEV1/FVC-POST: 45 %
FEV1/FVC-PRE: 40 %
FVC %PRED-POST: NORMAL L
FVC %PRED-PRE: NORMAL %
FVC PRED: NORMAL L
FVC-POST: NORMAL L
FVC-PRE: NORMAL L
GAW %PRED: NORMAL %
GAW PRED: NORMAL L/S/CMH2O
GAW: NORMAL L/S/CMH2O
IC %PRED: NORMAL %
IC PRED: NORMAL L
IC: NORMAL L
MEP: NORMAL
MIP: NORMAL
MVV %PRED-PRE: NORMAL %
MVV PRED: NORMAL L/MIN
MVV-PRE: NORMAL L/MIN
PEF %PRED-POST: NORMAL %
PEF %PRED-PRE: NORMAL L/SEC
PEF PRED: NORMAL L/SEC
PEF%CHNG: NORMAL
PEF-POST: NORMAL L/SEC
PEF-PRE: NORMAL L/SEC
RAW %PRED: NORMAL %
RAW PRED: NORMAL CMH2O/L/S
RAW: NORMAL CMH2O/L/S
RV %PRED: NORMAL %
RV PRED: NORMAL L
RV: NORMAL L
SVC %PRED: NORMAL %
SVC PRED: NORMAL L
SVC: NORMAL L
TLC %PRED: 35 %
TLC PRED: NORMAL L
TLC: NORMAL L
VA %PRED: NORMAL %
VA PRED: NORMAL L
VA: NORMAL L
VTG %PRED: NORMAL %
VTG PRED: NORMAL L
VTG: NORMAL L

## 2019-04-17 PROCEDURE — 94727 GAS DIL/WSHOT DETER LNG VOL: CPT

## 2019-04-17 PROCEDURE — 94729 DIFFUSING CAPACITY: CPT

## 2019-04-17 PROCEDURE — 94060 EVALUATION OF WHEEZING: CPT

## 2019-04-17 ASSESSMENT — PULMONARY FUNCTION TESTS
FEV1/FVC_PRE: 40
FEV1_PERCENT_PREDICTED_PRE: 35
FEV1/FVC_POST: 45
FEV1_PERCENT_PREDICTED_POST: 43

## 2019-04-23 ENCOUNTER — OFFICE VISIT (OUTPATIENT)
Dept: CARDIOLOGY CLINIC | Age: 79
End: 2019-04-23
Payer: MEDICARE

## 2019-04-23 ENCOUNTER — APPOINTMENT (OUTPATIENT)
Dept: CT IMAGING | Age: 79
DRG: 247 | End: 2019-04-23
Attending: INTERNAL MEDICINE
Payer: MEDICARE

## 2019-04-23 ENCOUNTER — TELEPHONE (OUTPATIENT)
Dept: CARDIOLOGY CLINIC | Age: 79
End: 2019-04-23

## 2019-04-23 ENCOUNTER — HOSPITAL ENCOUNTER (INPATIENT)
Age: 79
LOS: 2 days | Discharge: HOME HEALTH | DRG: 247 | End: 2019-04-25
Attending: INTERNAL MEDICINE | Admitting: INTERNAL MEDICINE
Payer: MEDICARE

## 2019-04-23 VITALS
SYSTOLIC BLOOD PRESSURE: 140 MMHG | DIASTOLIC BLOOD PRESSURE: 90 MMHG | HEART RATE: 68 BPM | WEIGHT: 196.4 LBS | HEIGHT: 72 IN | BODY MASS INDEX: 26.6 KG/M2

## 2019-04-23 DIAGNOSIS — R06.02 SOB (SHORTNESS OF BREATH) ON EXERTION: ICD-10-CM

## 2019-04-23 DIAGNOSIS — J90 PLEURAL EFFUSION: ICD-10-CM

## 2019-04-23 DIAGNOSIS — I25.10 ASCVD (ARTERIOSCLEROTIC CARDIOVASCULAR DISEASE): ICD-10-CM

## 2019-04-23 DIAGNOSIS — E78.5 HYPERLIPIDEMIA ASSOCIATED WITH TYPE 2 DIABETES MELLITUS (HCC): ICD-10-CM

## 2019-04-23 DIAGNOSIS — I34.1 MVP (MITRAL VALVE PROLAPSE): ICD-10-CM

## 2019-04-23 DIAGNOSIS — R07.9 CHEST PAIN, UNSPECIFIED TYPE: Primary | ICD-10-CM

## 2019-04-23 DIAGNOSIS — E11.69 HYPERLIPIDEMIA ASSOCIATED WITH TYPE 2 DIABETES MELLITUS (HCC): ICD-10-CM

## 2019-04-23 LAB
ALBUMIN SERPL-MCNC: 3.9 GM/DL (ref 3.4–5)
ALP BLD-CCNC: 60 IU/L (ref 40–129)
ALT SERPL-CCNC: 12 U/L (ref 10–40)
ANION GAP SERPL CALCULATED.3IONS-SCNC: 10 MMOL/L (ref 4–16)
AST SERPL-CCNC: 19 IU/L (ref 15–37)
BACTERIA: NEGATIVE /HPF
BASOPHILS ABSOLUTE: 0.1 K/CU MM
BASOPHILS RELATIVE PERCENT: 1 % (ref 0–1)
BILIRUB SERPL-MCNC: 0.5 MG/DL (ref 0–1)
BILIRUBIN DIRECT: 0.2 MG/DL (ref 0–0.3)
BILIRUBIN URINE: NEGATIVE MG/DL
BILIRUBIN, INDIRECT: 0.3 MG/DL (ref 0–0.7)
BLOOD, URINE: NEGATIVE
BUN BLDV-MCNC: 18 MG/DL (ref 6–23)
CALCIUM SERPL-MCNC: 9.5 MG/DL (ref 8.3–10.6)
CHLORIDE BLD-SCNC: 102 MMOL/L (ref 99–110)
CLARITY: CLEAR
CO2: 26 MMOL/L (ref 21–32)
COLOR: YELLOW
CREAT SERPL-MCNC: 1.1 MG/DL (ref 0.9–1.3)
DIFFERENTIAL TYPE: ABNORMAL
EOSINOPHILS ABSOLUTE: 0.4 K/CU MM
EOSINOPHILS RELATIVE PERCENT: 4.3 % (ref 0–3)
GFR AFRICAN AMERICAN: >60 ML/MIN/1.73M2
GFR NON-AFRICAN AMERICAN: >60 ML/MIN/1.73M2
GLUCOSE BLD-MCNC: 132 MG/DL (ref 70–99)
GLUCOSE BLD-MCNC: 134 MG/DL (ref 70–99)
GLUCOSE BLD-MCNC: 169 MG/DL (ref 70–99)
GLUCOSE BLD-MCNC: 247 MG/DL (ref 70–99)
GLUCOSE, URINE: >500 MG/DL
HCT VFR BLD CALC: 43.6 % (ref 42–52)
HEMOGLOBIN: 13.3 GM/DL (ref 13.5–18)
IMMATURE NEUTROPHIL %: 0.5 % (ref 0–0.43)
KETONES, URINE: ABNORMAL MG/DL
LEUKOCYTE ESTERASE, URINE: NEGATIVE
LYMPHOCYTES ABSOLUTE: 2.7 K/CU MM
LYMPHOCYTES RELATIVE PERCENT: 30.2 % (ref 24–44)
MAGNESIUM: 1.8 MG/DL (ref 1.8–2.4)
MCH RBC QN AUTO: 29.5 PG (ref 27–31)
MCHC RBC AUTO-ENTMCNC: 30.5 % (ref 32–36)
MCV RBC AUTO: 96.7 FL (ref 78–100)
MONOCYTES ABSOLUTE: 0.7 K/CU MM
MONOCYTES RELATIVE PERCENT: 8 % (ref 0–4)
NITRITE URINE, QUANTITATIVE: NEGATIVE
NUCLEATED RBC %: 0 %
PDW BLD-RTO: 14.6 % (ref 11.7–14.9)
PH, URINE: 7 (ref 5–8)
PLATELET # BLD: 207 K/CU MM (ref 140–440)
PMV BLD AUTO: 10.7 FL (ref 7.5–11.1)
POTASSIUM SERPL-SCNC: 5.1 MMOL/L (ref 3.5–5.1)
PROTEIN UA: NEGATIVE MG/DL
RBC # BLD: 4.51 M/CU MM (ref 4.6–6.2)
RBC URINE: <1 /HPF (ref 0–3)
SEGMENTED NEUTROPHILS ABSOLUTE COUNT: 5 K/CU MM
SEGMENTED NEUTROPHILS RELATIVE PERCENT: 56 % (ref 36–66)
SODIUM BLD-SCNC: 138 MMOL/L (ref 135–145)
SPECIFIC GRAVITY UA: 1.03 (ref 1–1.03)
TOTAL IMMATURE NEUTOROPHIL: 0.04 K/CU MM
TOTAL NUCLEATED RBC: 0 K/CU MM
TOTAL PROTEIN: 6.9 GM/DL (ref 6.4–8.2)
TRICHOMONAS: ABNORMAL /HPF
TROPONIN T: <0.01 NG/ML
TROPONIN T: <0.01 NG/ML
UROBILINOGEN, URINE: NORMAL MG/DL (ref 0.2–1)
WBC # BLD: 8.8 K/CU MM (ref 4–10.5)
WBC UA: ABNORMAL /HPF (ref 0–2)

## 2019-04-23 PROCEDURE — 6370000000 HC RX 637 (ALT 250 FOR IP): Performed by: INTERNAL MEDICINE

## 2019-04-23 PROCEDURE — 85025 COMPLETE CBC W/AUTO DIFF WBC: CPT

## 2019-04-23 PROCEDURE — 99214 OFFICE O/P EST MOD 30 MIN: CPT | Performed by: INTERNAL MEDICINE

## 2019-04-23 PROCEDURE — G0378 HOSPITAL OBSERVATION PER HR: HCPCS

## 2019-04-23 PROCEDURE — 6360000002 HC RX W HCPCS: Performed by: INTERNAL MEDICINE

## 2019-04-23 PROCEDURE — 82962 GLUCOSE BLOOD TEST: CPT

## 2019-04-23 PROCEDURE — 1123F ACP DISCUSS/DSCN MKR DOCD: CPT | Performed by: INTERNAL MEDICINE

## 2019-04-23 PROCEDURE — 71275 CT ANGIOGRAPHY CHEST: CPT

## 2019-04-23 PROCEDURE — 36415 COLL VENOUS BLD VENIPUNCTURE: CPT

## 2019-04-23 PROCEDURE — 83735 ASSAY OF MAGNESIUM: CPT

## 2019-04-23 PROCEDURE — 82248 BILIRUBIN DIRECT: CPT

## 2019-04-23 PROCEDURE — G8598 ASA/ANTIPLAT THER USED: HCPCS | Performed by: INTERNAL MEDICINE

## 2019-04-23 PROCEDURE — 87086 URINE CULTURE/COLONY COUNT: CPT

## 2019-04-23 PROCEDURE — 93000 ELECTROCARDIOGRAM COMPLETE: CPT | Performed by: INTERNAL MEDICINE

## 2019-04-23 PROCEDURE — 96374 THER/PROPH/DIAG INJ IV PUSH: CPT

## 2019-04-23 PROCEDURE — 96372 THER/PROPH/DIAG INJ SC/IM: CPT

## 2019-04-23 PROCEDURE — 6360000004 HC RX CONTRAST MEDICATION: Performed by: INTERNAL MEDICINE

## 2019-04-23 PROCEDURE — 80053 COMPREHEN METABOLIC PANEL: CPT

## 2019-04-23 PROCEDURE — 4040F PNEUMOC VAC/ADMIN/RCVD: CPT | Performed by: INTERNAL MEDICINE

## 2019-04-23 PROCEDURE — 81001 URINALYSIS AUTO W/SCOPE: CPT

## 2019-04-23 PROCEDURE — 84484 ASSAY OF TROPONIN QUANT: CPT

## 2019-04-23 PROCEDURE — G8427 DOCREV CUR MEDS BY ELIG CLIN: HCPCS | Performed by: INTERNAL MEDICINE

## 2019-04-23 PROCEDURE — 2580000003 HC RX 258: Performed by: INTERNAL MEDICINE

## 2019-04-23 PROCEDURE — G8417 CALC BMI ABV UP PARAM F/U: HCPCS | Performed by: INTERNAL MEDICINE

## 2019-04-23 PROCEDURE — 1036F TOBACCO NON-USER: CPT | Performed by: INTERNAL MEDICINE

## 2019-04-23 PROCEDURE — 94640 AIRWAY INHALATION TREATMENT: CPT

## 2019-04-23 RX ORDER — POLYETHYLENE GLYCOL 3350 17 G/17G
17 POWDER, FOR SOLUTION ORAL DAILY
Status: DISCONTINUED | OUTPATIENT
Start: 2019-04-23 | End: 2019-04-23 | Stop reason: CLARIF

## 2019-04-23 RX ORDER — DONEPEZIL HYDROCHLORIDE 10 MG/1
10 TABLET, FILM COATED ORAL NIGHTLY
Status: DISCONTINUED | OUTPATIENT
Start: 2019-04-23 | End: 2019-04-25 | Stop reason: HOSPADM

## 2019-04-23 RX ORDER — 0.9 % SODIUM CHLORIDE 0.9 %
10 VIAL (ML) INJECTION
Status: COMPLETED | OUTPATIENT
Start: 2019-04-23 | End: 2019-04-23

## 2019-04-23 RX ORDER — SODIUM CHLORIDE 9 MG/ML
INJECTION, SOLUTION INTRAVENOUS CONTINUOUS
Status: DISCONTINUED | OUTPATIENT
Start: 2019-04-23 | End: 2019-04-24 | Stop reason: ALTCHOICE

## 2019-04-23 RX ORDER — DOCUSATE SODIUM 100 MG/1
100 CAPSULE, LIQUID FILLED ORAL 2 TIMES DAILY
Status: DISCONTINUED | OUTPATIENT
Start: 2019-04-23 | End: 2019-04-25 | Stop reason: HOSPADM

## 2019-04-23 RX ORDER — ATORVASTATIN CALCIUM 20 MG/1
20 TABLET, FILM COATED ORAL NIGHTLY
Status: DISCONTINUED | OUTPATIENT
Start: 2019-04-23 | End: 2019-04-25 | Stop reason: HOSPADM

## 2019-04-23 RX ORDER — IPRATROPIUM BROMIDE AND ALBUTEROL SULFATE 2.5; .5 MG/3ML; MG/3ML
1 SOLUTION RESPIRATORY (INHALATION)
Status: DISCONTINUED | OUTPATIENT
Start: 2019-04-23 | End: 2019-04-25 | Stop reason: HOSPADM

## 2019-04-23 RX ORDER — ASPIRIN 81 MG/1
81 TABLET ORAL EVERY MORNING
Status: DISCONTINUED | OUTPATIENT
Start: 2019-04-23 | End: 2019-04-24 | Stop reason: ALTCHOICE

## 2019-04-23 RX ORDER — METHYLPREDNISOLONE SODIUM SUCCINATE 40 MG/ML
40 INJECTION, POWDER, LYOPHILIZED, FOR SOLUTION INTRAMUSCULAR; INTRAVENOUS EVERY 12 HOURS
Status: DISCONTINUED | OUTPATIENT
Start: 2019-04-23 | End: 2019-04-25 | Stop reason: HOSPADM

## 2019-04-23 RX ORDER — POLYETHYLENE GLYCOL 3350 17 G/17G
17 POWDER, FOR SOLUTION ORAL DAILY
Status: DISCONTINUED | OUTPATIENT
Start: 2019-04-23 | End: 2019-04-25 | Stop reason: HOSPADM

## 2019-04-23 RX ORDER — DEXTROSE MONOHYDRATE 50 MG/ML
100 INJECTION, SOLUTION INTRAVENOUS PRN
Status: DISCONTINUED | OUTPATIENT
Start: 2019-04-23 | End: 2019-04-25 | Stop reason: HOSPADM

## 2019-04-23 RX ORDER — DEXTROSE MONOHYDRATE 25 G/50ML
12.5 INJECTION, SOLUTION INTRAVENOUS PRN
Status: DISCONTINUED | OUTPATIENT
Start: 2019-04-23 | End: 2019-04-25 | Stop reason: HOSPADM

## 2019-04-23 RX ORDER — LEVOTHYROXINE SODIUM 0.05 MG/1
50 TABLET ORAL DAILY
Status: DISCONTINUED | OUTPATIENT
Start: 2019-04-24 | End: 2019-04-25 | Stop reason: HOSPADM

## 2019-04-23 RX ORDER — NICOTINE POLACRILEX 4 MG
15 LOZENGE BUCCAL PRN
Status: DISCONTINUED | OUTPATIENT
Start: 2019-04-23 | End: 2019-04-25 | Stop reason: HOSPADM

## 2019-04-23 RX ADMIN — SODIUM CHLORIDE 10 ML: 9 INJECTION, SOLUTION INTRAMUSCULAR; INTRAVENOUS; SUBCUTANEOUS at 12:21

## 2019-04-23 RX ADMIN — DOCUSATE SODIUM 100 MG: 100 CAPSULE, LIQUID FILLED ORAL at 21:34

## 2019-04-23 RX ADMIN — DOCUSATE SODIUM 100 MG: 100 CAPSULE, LIQUID FILLED ORAL at 14:33

## 2019-04-23 RX ADMIN — INSULIN LISPRO 1 UNITS: 100 INJECTION, SOLUTION INTRAVENOUS; SUBCUTANEOUS at 21:35

## 2019-04-23 RX ADMIN — IPRATROPIUM BROMIDE AND ALBUTEROL SULFATE 1 AMPULE: .5; 3 SOLUTION RESPIRATORY (INHALATION) at 22:04

## 2019-04-23 RX ADMIN — ENOXAPARIN SODIUM 40 MG: 40 INJECTION SUBCUTANEOUS at 14:33

## 2019-04-23 RX ADMIN — IOPAMIDOL 75 ML: 755 INJECTION, SOLUTION INTRAVENOUS at 12:20

## 2019-04-23 RX ADMIN — ASPIRIN 81 MG: 81 TABLET, COATED ORAL at 14:33

## 2019-04-23 RX ADMIN — METHYLPREDNISOLONE SODIUM SUCCINATE 40 MG: 40 INJECTION, POWDER, FOR SOLUTION INTRAMUSCULAR; INTRAVENOUS at 15:57

## 2019-04-23 RX ADMIN — DONEPEZIL HYDROCHLORIDE 10 MG: 10 TABLET, FILM COATED ORAL at 21:34

## 2019-04-23 RX ADMIN — ATORVASTATIN CALCIUM 20 MG: 20 TABLET, FILM COATED ORAL at 21:34

## 2019-04-23 RX ADMIN — SODIUM CHLORIDE: 9 INJECTION, SOLUTION INTRAVENOUS at 14:32

## 2019-04-23 ASSESSMENT — PAIN SCALES - GENERAL
PAINLEVEL_OUTOF10: 0
PAINLEVEL_OUTOF10: 0

## 2019-04-23 NOTE — TELEPHONE ENCOUNTER
Maya AVERY from [facility] called with bed assignment. Patient will be going to Room 1101 Bed A. Patient to be  advised by Nelly Tovar MA to check in at Registration desk.

## 2019-04-23 NOTE — TELEPHONE ENCOUNTER
[address], Spoke to [employer]. Patient will need direct admission to [facility] for chest pain and shortness of breath. Dr Buddy Sharma has spoken to Dr Dawit Corcoran before I spoke to [facility]GOO[facility]. Denise Whitmore will return call to myself once room assignment has been given.

## 2019-04-23 NOTE — PROGRESS NOTES
1/30/2013-Normal study. Normal perfusion in all regions. EF 62%. 06--EF=60%. Normal. Normal pattern of perfusion. LV normal size. No wall abnormality. Exercise capacity good. (05-, 12-, 06-, , )    H/O chest pain     H/O chest x-ray 06-    Negative. Dx was dyspnea and CAD.  (02-)    H/O dizziness     H/O Doppler ultrasound 11-    (Carotid) Intimal thickening but no significant atherosclerotic plaque noted in right or left ICA. Doppler flow velocities w/in right and left ICA WNL.  H/O echocardiogram 1/30/2013, 05-    1/30/2013- LVSF normal. EF 50-55%. Impaired LV relaxation. Trace MR. Trace TR;   5- Normal LV size and systolic function. EF=60%. Chamber dimensions WNL. Mild concentric LV hypertrophy. Valves appear structurally normal.-OICC       H/O gastroesophageal reflux (GERD)     H/O pneumothorax Dx 1960    \"Both Sides\"    History of cardiac monitoring 12/03/2018    9 beat run of SVT, no other arrhythmias noted, primarily sinus rythym    History of complete ECG 06/06/2011    (06-, 07-, 06-, 06-)    History of stress test 11/07/2018    EF 61%, Normal    [place] (hard of hearing)     Bilateral Hearing Aids    Hx of CT scan of chest 11/12/2018    3 mm indeterminate solid nonobstructing endobronchial nodule of the proximal left upper lobe bronchus. Further evaluation with endoscopy is recommended. Bilateral basilar predominant intersitial changes suggestive of nonspecific interstitial pneumonitis.     Hx of Doppler echocardiogram 11/20/2018    EF55-60%,no valvular disease    Hyperlipidemia     Hypertension     Left Lung Cancer Dx 11-18    LEFT UPPER LOBECTOMY 1/28/19    Lung nodule     \"they say I have a spot on my lung- and mass in left lung so thats why doing the bronch\"( 12/7/2018)    Memory loss     MVP (mitral valve prolapse)     follows with Dr Michelle Catherine (myocardial infarction) symptoms  · Psychiatric: insomnia  · Endocrine: No malaise, fatigue or temperature intolerance  · Hematologic/Lymphatic: No bleeding problems, blood clots or swollen lymph nodes  · Allergic/Immunologic: No nasal congestion or hives    Objective:      Physical Exam:  BP (!) 140/90   Pulse 68   Ht 6' (1.829 m)   Wt 196 lb 6.4 oz (89.1 kg)   BMI 26.64 kg/m²   Wt Readings from Last 3 Encounters:   04/23/19 196 lb 6.4 oz (89.1 kg)   04/15/19 198 lb (89.8 kg)   04/09/19 197 lb (89.4 kg)     Body mass index is 26.64 kg/m². Vitals:    04/23/19 0842   BP: (!) 140/90   Pulse: 68        General Appearance:  No distress, conversant  Constitutional:  Well developed, Well nourished, No acute distress, Non-toxic appearance. HENT:  Normocephalic, Atraumatic, Bilateral external ears normal, Oropharynx moist, No oral exudates, Nose normal. Neck- Normal range of motion, No tenderness, Supple, No stridor,no apical-carotid delay  Eyes:  PERRL, EOMI, Conjunctiva normal, No discharge. Respiratory:  Normal breath sounds, No respiratory distress, No wheezing, No chest tenderness. ,no use of accessory muscles, NO crackles  Cardiovascular: (PMI) apex non displaced,no lifts no thrills,S1 and S2 audible, No added heart sounds, No signs of ankle edema, or volume overload, No evidence of JVD, No crackles  GI:  Bowel sounds normal, Soft, No tenderness, No masses, No gross visceromegaly   :  No costovertebral angle tenderness   Musculoskeletal:  No edema, no tenderness, no deformities.  Back- no tenderness  Integument:  Well hydrated, no rash   Lymphatic:  No lymphadenopathy noted   Neurologic:  Alert & oriented x 3, CN 2-12 normal, normal motor function, normal sensory function, no focal deficits noted   Psychiatric:  Speech and behavior appropriate       Medical decision making and Data review:  DATA:  Lab Results   Component Value Date    TROPONINT <0.010 02/24/2019     BNP:    Lab Results   Component Value Date    PROBNP 140.2 effusion    6. SOB (shortness of breath) on exertion           Chest pain  He is mainly exhausted on minimal exertion. He has had some tingling in chest associated with it . HE had cardiac cath many years ago showing disease. Stress test in nov 2018 was normal , He had SVT on holter which could be due to lung , however, I'm also concerned about his lung issues. His reduce it, intrinsic left side. We will send him for direct admission to hospital.  I have called and discussed with hospitalist on call. He will need a CT chest, possible thoracic surgery evaluation. We will then plan cardiac workup, potentially a cardiac cath. Plan discussed with patient and his wife    ASCVD (arteriosclerotic cardiovascular disease)  As per cath many years ago but stress test was normal. He is on aspirin and statins. Dyslipidemia :  All available lab work was reviewed. Patient was advised to repeat lab work before next visit      Counseled extensively and medication compliance urged. We discussed that for the  prevention of ASCVD our  goal is aggressive risk modification. Patient is encouraged to exercise even a brisk walk for 30 minutes  at least 3 to 4 times a week   Various goals were discussed and questions answered. Continue current medications. Appropriate prescriptions are addressed and refills ordered. Questions answered and patient verbalizes understanding. Call for any problems, questions, or concerns. Continue all other medications of all above medical condition listed as is. Return in about 3 weeks (around 5/14/2019). Please note this report has been partially produced using speech recognition software and may contain errors related to that system including errors in grammar, punctuation, and spelling, as well as words and phrases that may be inappropriate.  If there are any questions or concerns please feel free to contact the dictating provider for clarification.

## 2019-04-23 NOTE — H&P
HISTORY AND PHYSICAL  (Hospitalist, Internal Medicine)  IDENTIFYING INFORMATION   PATIENT:  Karina De Souza  MRN:  [id number]  ADMIT DATE: 4/23/2019  TIME OF EVALUATION: 4/23/2019 12:11 PM    CHIEF COMPLAINT   Chest pain  SOB    HISTORY OF PRESENT ILLNESS   Jefferson Herman is a 69 y. o. 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 His function has declined since surgery in Jan 2019 but has been slowly improving with outpatient therapy. However, on Saturday, he reported feeling a heaviness in his mid chest with pain radiation into the left arm. He did not tell family. On Sunday, he felt tired and laid around in bed. On Monday, he went to PT and was placed on a bicycle where after a short session, became as \"white as a sheet\". BP measured was low in 90 SBP. Was advised to follow up with cardiology which he did today. On cardiology review in  clinic, he reports ongoing SOB on light exertion. Her PMH listed below:    PAST MEDICAL, SURGICAL, FAMILY, and SOCIAL HISTORY     Past Medical History:   Diagnosis Date    Arthritis     \"Back, Knees\"    Back pain     \"At Times\"    CAD (coronary artery disease)     Diabetes mellitus ([facility] [address]     dx 5+ yrs ago-     Glaucoma     Bilateral Eyes    H/O 24 hour EKG monitoring 11-    Predominantly SR - avg rate of 69bpm. Lowest rate 46 bpm at 0622. Frequent isolated 5954 PVCs noted mostly in bigemenal pattern w/out complex arrhythmias. Five beat run of atrial tachycardia at 1228. No palpitations or dizziness reported in patient's daily activity report. (12-, )    H/O cardiac catheterization 06-    Noncritical diffuse CAD w/no critical stenosis. Diag borderline significant stenosis, not large enough fo percutaneous intervention.  No significant angiographic progression of atherosclerosis since cath in 1997.   ( per Dr. Harepr Guerar at [facility]. Preserved vent function. MVP. CAD w/normal LM, 30-40% stenosis LAD, 70-80% stenosis of ostium & prox seg diag branch, normal left CX & RCA.)    H/O cardiovascular stress test 1/30/2013, 06- 1/30/2013-Normal study. Normal perfusion in all regions. EF 62%. 06--EF=60%. Normal. Normal pattern of perfusion. LV normal size. No wall abnormality. Exercise capacity good. (05-, 12-, 06-, , )    H/O chest pain     H/O chest x-ray 06-    Negative. Dx was dyspnea and CAD.  (02-)    H/O dizziness     H/O Doppler ultrasound 11-    (Carotid) Intimal thickening but no significant atherosclerotic plaque noted in right or left ICA. Doppler flow velocities w/in right and left ICA WNL.  H/O echocardiogram 1/30/2013, 05-    1/30/2013- LVSF normal. EF 50-55%. Impaired LV relaxation. Trace MR. Trace TR;   5- Normal LV size and systolic function. EF=60%. Chamber dimensions WNL. Mild concentric LV hypertrophy. Valves appear structurally normal.-OICC       H/O gastroesophageal reflux (GERD)     H/O pneumothorax Dx 1960    \"Both Sides\"    History of cardiac monitoring 12/03/2018    9 beat run of SVT, no other arrhythmias noted, primarily sinus rythym    History of complete ECG 06/06/2011    (06-, 07-, 06-, 06-)    History of stress test 11/07/2018    EF 61%, Normal    [place] (hard of hearing)     Bilateral Hearing Aids    Hx of CT scan of chest 11/12/2018    3 mm indeterminate solid nonobstructing endobronchial nodule of the proximal left upper lobe bronchus. Further evaluation with endoscopy is recommended. Bilateral basilar predominant intersitial changes suggestive of nonspecific interstitial pneumonitis.     Hx of Doppler echocardiogram 11/20/2018    EF55-60%,no valvular disease    Hyperlipidemia     Hypertension     Left Not on file    Food insecurity:     Worry: Not on file     Inability: Not on file    Transportation needs:     Medical: Not on file     Non-medical: Not on file   Tobacco Use    Smoking status: Never Smoker    Smokeless tobacco: Never Used   Substance and Sexual Activity    Alcohol use: Yes     Comment: \"Maybe Once A Month\"    Drug use: No    Sexual activity: Never   Lifestyle    Physical activity:     Days per week: Not on file     Minutes per session: Not on file    Stress: Not on file   Relationships    Social connections:     Talks on phone: Not on file     Gets together: Not on file     Attends Mu-ism service: Not on file     Active member of club or organization: Not on file     Attends meetings of clubs or organizations: Not on file     Relationship status: Not on file    Intimate partner violence:     Fear of current or ex partner: Not on file     Emotionally abused: Not on file     Physically abused: Not on file     Forced sexual activity: Not on file   Other Topics Concern    Not on file   Social History Narrative    Not on file       MEDICATIONS   Medications Prior to Admission  Medications Prior to Admission: donepezil (ARICEPT) 10 MG tablet, Take 1 tablet by mouth nightly  atorvastatin (LIPITOR) 20 MG tablet, Take 1 tablet by mouth daily  pioglitazone (ACTOS) 45 MG tablet, Take 1 tablet by mouth daily  sertraline (ZOLOFT) 50 MG tablet, Take 1 tablet by mouth daily  SITagliptin (JANUVIA) 100 MG tablet, Take 1 tablet by mouth daily  levothyroxine (SYNTHROID) 50 MCG tablet, Take 1 tablet by mouth Daily  lactobacillus (CULTURELLE) capsule, Take 1 capsule by mouth daily (with breakfast)  lidocaine (LMX) 4 % cream, Apply topically as needed.   docusate sodium (COLACE, DULCOLAX) 100 MG CAPS, Take 100 mg by mouth 2 times daily  [employer] GAS RELIEF 80 MG chewable tablet, Take 1 tablet by mouth as needed  polyethylene glycol (GLYCOLAX) powder, Take 17 g by mouth 2 times daily  aspirin 81 MG EC tablet, Known Allergies    REVIEW OF SYSTEMS   Within above limitations. 14 point review of systems reviewed. Pertinent positive or negative as per HPI or otherwise negative per 14 point systems review. PHYSICAL EXAM     Wt Readings from Last 3 Encounters:   04/23/19 196 lb 6.4 oz (89.1 kg)   04/15/19 198 lb (89.8 kg)   04/09/19 197 lb (89.4 kg)       Blood pressure (!) 153/85, pulse 76, temperature 97.7 °F (36.5 °C), temperature source Oral, resp. rate 17, SpO2 95 %. General - AAO x 3  Psych - Appropriate affect/speech. No agitation  Eyes - Eye lids intact. No scleral icterus  ENT - Lips wnl. External ear clear/dry/intact. No thyromegaly on inspection  Neuro - No gross peripheral or central neuro deficits on inspection  Heart - Sinus. RRR. S1 and S2 present. No elevated JVD appreciated  Lung - Adequate air entry b/l, No crackles/wheezes appreciated  GI - Soft. No guarding/rigidity. BS+   - No CVA/suprapubic tenderness or palpable bladder distension  Skin - Intact. No rash/petechiae/ecchymosis.  Warm extremities      LABS AND IMAGING   CBC  [unfilled]    Last 3 Hemoglobin  Lab Results   Component Value Date    HGB 13.3 04/23/2019    HGB 12.2 03/13/2019    HGB 12.6 03/12/2019     Last 3 WBC/ANC  Lab Results   Component Value Date    WBC 8.8 04/23/2019    WBC 7.4 03/13/2019    WBC 7.9 03/12/2019     No components found for: GRNLOCTYABS  Last 3 Platelets  No results found for: PLATELET  Chemistry  [unfilled]  [unfilled]  No results found for: LDH  Coagulation Studies  Lab Results   Component Value Date    INR 1.08 02/24/2019     Liver Function Studies  Lab Results   Component Value Date    ALT 12 04/23/2019    AST 19 04/23/2019    ALKPHOS 60 04/23/2019           ASSESSMENT AND PLAN       Chest pain   ALVES  - d/w outpatient cards admit for cardiac-pulm evaluation, CTA chest  - trend trop, labs  - IVF for CTA contrast ppx  - patient tells me that a cath was considered but unconfirmed  - reviewed pre-op TTE and stress 11/2018 that was non acute    Fatigue, tiredness  - could be deconditioning post lobectomy  - recent TFT 3/2019 normal    DMII  - hold oral med  - ISS for now    Hypothyroid on synthroid    Depression     Lovenox ppx    Case d/w ED physician    [facility], Internal Medicine  4/23/2019 at 12:11 PM

## 2019-04-24 PROBLEM — I20.8 ANGINA AT REST (HCC): Status: ACTIVE | Noted: 2019-04-24

## 2019-04-24 LAB
ACTIVATED CLOTTING TIME, LOW RANGE: 277 SEC
ANION GAP SERPL CALCULATED.3IONS-SCNC: 11 MMOL/L (ref 4–16)
BASOPHILS ABSOLUTE: 0.1 K/CU MM
BASOPHILS RELATIVE PERCENT: 0.6 % (ref 0–1)
BUN BLDV-MCNC: 19 MG/DL (ref 6–23)
CALCIUM SERPL-MCNC: 9.3 MG/DL (ref 8.3–10.6)
CHLORIDE BLD-SCNC: 104 MMOL/L (ref 99–110)
CO2: 23 MMOL/L (ref 21–32)
CREAT SERPL-MCNC: 1 MG/DL (ref 0.9–1.3)
DIFFERENTIAL TYPE: ABNORMAL
EOSINOPHILS ABSOLUTE: 0 K/CU MM
EOSINOPHILS RELATIVE PERCENT: 0 % (ref 0–3)
GFR AFRICAN AMERICAN: >60 ML/MIN/1.73M2
GFR NON-AFRICAN AMERICAN: >60 ML/MIN/1.73M2
GLUCOSE BLD-MCNC: 166 MG/DL (ref 70–99)
GLUCOSE BLD-MCNC: 171 MG/DL (ref 70–99)
GLUCOSE BLD-MCNC: 216 MG/DL (ref 70–99)
GLUCOSE BLD-MCNC: 230 MG/DL (ref 70–99)
GLUCOSE BLD-MCNC: 261 MG/DL (ref 70–99)
HCT VFR BLD CALC: 38.9 % (ref 42–52)
HEMOGLOBIN: 12.3 GM/DL (ref 13.5–18)
IMMATURE NEUTROPHIL %: 0.4 % (ref 0–0.43)
LACTIC ACID, SEPSIS: 1.7 MMOL/L (ref 0.5–1.9)
LYMPHOCYTES ABSOLUTE: 2.5 K/CU MM
LYMPHOCYTES RELATIVE PERCENT: 24 % (ref 24–44)
MCH RBC QN AUTO: 29.9 PG (ref 27–31)
MCHC RBC AUTO-ENTMCNC: 31.6 % (ref 32–36)
MCV RBC AUTO: 94.6 FL (ref 78–100)
MONOCYTES ABSOLUTE: 0.4 K/CU MM
MONOCYTES RELATIVE PERCENT: 3.4 % (ref 0–4)
NUCLEATED RBC %: 0 %
PDW BLD-RTO: 14.5 % (ref 11.7–14.9)
PLATELET # BLD: 196 K/CU MM (ref 140–440)
PMV BLD AUTO: 10.4 FL (ref 7.5–11.1)
POTASSIUM SERPL-SCNC: 4.3 MMOL/L (ref 3.5–5.1)
PROCALCITONIN: 0.05
RBC # BLD: 4.11 M/CU MM (ref 4.6–6.2)
SEGMENTED NEUTROPHILS ABSOLUTE COUNT: 7.3 K/CU MM
SEGMENTED NEUTROPHILS RELATIVE PERCENT: 71.6 % (ref 36–66)
SODIUM BLD-SCNC: 138 MMOL/L (ref 135–145)
TOTAL IMMATURE NEUTOROPHIL: 0.04 K/CU MM
TOTAL NUCLEATED RBC: 0 K/CU MM
WBC # BLD: 10.2 K/CU MM (ref 4–10.5)

## 2019-04-24 PROCEDURE — 2580000003 HC RX 258: Performed by: INTERNAL MEDICINE

## 2019-04-24 PROCEDURE — 6360000002 HC RX W HCPCS

## 2019-04-24 PROCEDURE — 96376 TX/PRO/DX INJ SAME DRUG ADON: CPT

## 2019-04-24 PROCEDURE — 80048 BASIC METABOLIC PNL TOTAL CA: CPT

## 2019-04-24 PROCEDURE — 94761 N-INVAS EAR/PLS OXIMETRY MLT: CPT

## 2019-04-24 PROCEDURE — 6360000004 HC RX CONTRAST MEDICATION

## 2019-04-24 PROCEDURE — 027034Z DILATION OF CORONARY ARTERY, ONE ARTERY WITH DRUG-ELUTING INTRALUMINAL DEVICE, PERCUTANEOUS APPROACH: ICD-10-PCS | Performed by: INTERNAL MEDICINE

## 2019-04-24 PROCEDURE — 2140000000 HC CCU INTERMEDIATE R&B

## 2019-04-24 PROCEDURE — 6360000002 HC RX W HCPCS: Performed by: INTERNAL MEDICINE

## 2019-04-24 PROCEDURE — 6370000000 HC RX 637 (ALT 250 FOR IP): Performed by: INTERNAL MEDICINE

## 2019-04-24 PROCEDURE — 92928 PRQ TCAT PLMT NTRAC ST 1 LES: CPT | Performed by: INTERNAL MEDICINE

## 2019-04-24 PROCEDURE — 92928 PRQ TCAT PLMT NTRAC ST 1 LES: CPT

## 2019-04-24 PROCEDURE — G0378 HOSPITAL OBSERVATION PER HR: HCPCS

## 2019-04-24 PROCEDURE — 2709999900 HC NON-CHARGEABLE SUPPLY

## 2019-04-24 PROCEDURE — 2500000003 HC RX 250 WO HCPCS

## 2019-04-24 PROCEDURE — 93571 IV DOP VEL&/PRESS C FLO 1ST: CPT | Performed by: INTERNAL MEDICINE

## 2019-04-24 PROCEDURE — 84145 PROCALCITONIN (PCT): CPT

## 2019-04-24 PROCEDURE — C1769 GUIDE WIRE: HCPCS

## 2019-04-24 PROCEDURE — B2111ZZ FLUOROSCOPY OF MULTIPLE CORONARY ARTERIES USING LOW OSMOLAR CONTRAST: ICD-10-PCS | Performed by: INTERNAL MEDICINE

## 2019-04-24 PROCEDURE — C1887 CATHETER, GUIDING: HCPCS

## 2019-04-24 PROCEDURE — 96372 THER/PROPH/DIAG INJ SC/IM: CPT

## 2019-04-24 PROCEDURE — 93458 L HRT ARTERY/VENTRICLE ANGIO: CPT

## 2019-04-24 PROCEDURE — 6370000000 HC RX 637 (ALT 250 FOR IP)

## 2019-04-24 PROCEDURE — 82962 GLUCOSE BLOOD TEST: CPT

## 2019-04-24 PROCEDURE — 83605 ASSAY OF LACTIC ACID: CPT

## 2019-04-24 PROCEDURE — 94640 AIRWAY INHALATION TREATMENT: CPT

## 2019-04-24 PROCEDURE — 93571 IV DOP VEL&/PRESS C FLO 1ST: CPT

## 2019-04-24 PROCEDURE — 93458 L HRT ARTERY/VENTRICLE ANGIO: CPT | Performed by: INTERNAL MEDICINE

## 2019-04-24 PROCEDURE — C1874 STENT, COATED/COV W/DEL SYS: HCPCS

## 2019-04-24 PROCEDURE — C1894 INTRO/SHEATH, NON-LASER: HCPCS

## 2019-04-24 PROCEDURE — 85347 COAGULATION TIME ACTIVATED: CPT

## 2019-04-24 PROCEDURE — 85025 COMPLETE CBC W/AUTO DIFF WBC: CPT

## 2019-04-24 RX ORDER — SODIUM CHLORIDE 0.9 % (FLUSH) 0.9 %
10 SYRINGE (ML) INJECTION PRN
Status: DISCONTINUED | OUTPATIENT
Start: 2019-04-24 | End: 2019-04-25 | Stop reason: HOSPADM

## 2019-04-24 RX ORDER — CLOPIDOGREL BISULFATE 75 MG/1
75 TABLET ORAL DAILY
Status: DISCONTINUED | OUTPATIENT
Start: 2019-04-25 | End: 2019-04-25 | Stop reason: HOSPADM

## 2019-04-24 RX ORDER — ASPIRIN 81 MG/1
81 TABLET, CHEWABLE ORAL DAILY
Status: DISCONTINUED | OUTPATIENT
Start: 2019-04-25 | End: 2019-04-25 | Stop reason: HOSPADM

## 2019-04-24 RX ORDER — ISOSORBIDE MONONITRATE 30 MG/1
30 TABLET, EXTENDED RELEASE ORAL DAILY
Status: DISCONTINUED | OUTPATIENT
Start: 2019-04-24 | End: 2019-04-25 | Stop reason: HOSPADM

## 2019-04-24 RX ORDER — SODIUM CHLORIDE 9 MG/ML
INJECTION, SOLUTION INTRAVENOUS CONTINUOUS
Status: DISCONTINUED | OUTPATIENT
Start: 2019-04-24 | End: 2019-04-25 | Stop reason: HOSPADM

## 2019-04-24 RX ORDER — ZOLPIDEM TARTRATE 5 MG/1
5 TABLET ORAL NIGHTLY PRN
Status: DISCONTINUED | OUTPATIENT
Start: 2019-04-24 | End: 2019-04-25 | Stop reason: HOSPADM

## 2019-04-24 RX ORDER — ONDANSETRON 2 MG/ML
4 INJECTION INTRAMUSCULAR; INTRAVENOUS EVERY 6 HOURS PRN
Status: DISCONTINUED | OUTPATIENT
Start: 2019-04-24 | End: 2019-04-25 | Stop reason: HOSPADM

## 2019-04-24 RX ORDER — SODIUM CHLORIDE 0.9 % (FLUSH) 0.9 %
10 SYRINGE (ML) INJECTION EVERY 12 HOURS SCHEDULED
Status: DISCONTINUED | OUTPATIENT
Start: 2019-04-24 | End: 2019-04-25 | Stop reason: HOSPADM

## 2019-04-24 RX ORDER — ACETAMINOPHEN 325 MG/1
650 TABLET ORAL EVERY 4 HOURS PRN
Status: DISCONTINUED | OUTPATIENT
Start: 2019-04-24 | End: 2019-04-25 | Stop reason: HOSPADM

## 2019-04-24 RX ADMIN — SODIUM CHLORIDE: 9 INJECTION, SOLUTION INTRAVENOUS at 00:19

## 2019-04-24 RX ADMIN — INSULIN LISPRO 1 UNITS: 100 INJECTION, SOLUTION INTRAVENOUS; SUBCUTANEOUS at 09:30

## 2019-04-24 RX ADMIN — SODIUM CHLORIDE, PRESERVATIVE FREE 10 ML: 5 INJECTION INTRAVENOUS at 21:25

## 2019-04-24 RX ADMIN — DOCUSATE SODIUM 100 MG: 100 CAPSULE, LIQUID FILLED ORAL at 09:26

## 2019-04-24 RX ADMIN — INSULIN LISPRO 1 UNITS: 100 INJECTION, SOLUTION INTRAVENOUS; SUBCUTANEOUS at 21:25

## 2019-04-24 RX ADMIN — ZOLPIDEM TARTRATE 5 MG: 5 TABLET ORAL at 00:16

## 2019-04-24 RX ADMIN — INSULIN LISPRO 3 UNITS: 100 INJECTION, SOLUTION INTRAVENOUS; SUBCUTANEOUS at 11:34

## 2019-04-24 RX ADMIN — ENOXAPARIN SODIUM 40 MG: 40 INJECTION SUBCUTANEOUS at 13:22

## 2019-04-24 RX ADMIN — IPRATROPIUM BROMIDE AND ALBUTEROL SULFATE 1 AMPULE: .5; 3 SOLUTION RESPIRATORY (INHALATION) at 11:15

## 2019-04-24 RX ADMIN — IPRATROPIUM BROMIDE AND ALBUTEROL SULFATE 1 AMPULE: .5; 3 SOLUTION RESPIRATORY (INHALATION) at 21:30

## 2019-04-24 RX ADMIN — DOCUSATE SODIUM 100 MG: 100 CAPSULE, LIQUID FILLED ORAL at 21:25

## 2019-04-24 RX ADMIN — METOPROLOL TARTRATE 25 MG: 25 TABLET ORAL at 09:26

## 2019-04-24 RX ADMIN — SODIUM CHLORIDE: 9 INJECTION, SOLUTION INTRAVENOUS at 13:22

## 2019-04-24 RX ADMIN — DONEPEZIL HYDROCHLORIDE 10 MG: 10 TABLET, FILM COATED ORAL at 21:25

## 2019-04-24 RX ADMIN — ATORVASTATIN CALCIUM 20 MG: 20 TABLET, FILM COATED ORAL at 21:26

## 2019-04-24 RX ADMIN — SERTRALINE HYDROCHLORIDE 50 MG: 50 TABLET ORAL at 09:26

## 2019-04-24 RX ADMIN — INSULIN LISPRO 2 UNITS: 100 INJECTION, SOLUTION INTRAVENOUS; SUBCUTANEOUS at 16:39

## 2019-04-24 RX ADMIN — METHYLPREDNISOLONE SODIUM SUCCINATE 40 MG: 40 INJECTION, POWDER, FOR SOLUTION INTRAMUSCULAR; INTRAVENOUS at 05:20

## 2019-04-24 RX ADMIN — ZOLPIDEM TARTRATE 5 MG: 5 TABLET ORAL at 21:26

## 2019-04-24 RX ADMIN — LEVOTHYROXINE SODIUM 50 MCG: 50 TABLET ORAL at 05:19

## 2019-04-24 RX ADMIN — METOPROLOL TARTRATE 25 MG: 25 TABLET ORAL at 21:26

## 2019-04-24 RX ADMIN — IPRATROPIUM BROMIDE AND ALBUTEROL SULFATE 1 AMPULE: .5; 3 SOLUTION RESPIRATORY (INHALATION) at 15:20

## 2019-04-24 RX ADMIN — ISOSORBIDE MONONITRATE 30 MG: 30 TABLET, EXTENDED RELEASE ORAL at 09:26

## 2019-04-24 RX ADMIN — METHYLPREDNISOLONE SODIUM SUCCINATE 40 MG: 40 INJECTION, POWDER, FOR SOLUTION INTRAMUSCULAR; INTRAVENOUS at 15:24

## 2019-04-24 ASSESSMENT — PAIN SCALES - GENERAL: PAINLEVEL_OUTOF10: 0

## 2019-04-24 NOTE — PROGRESS NOTES
[facility] TR band intact without hematoma. Site check by Gautam Ramirez RN and Farooq Chavarria.

## 2019-04-24 NOTE — PROGRESS NOTES
Progress Note (Hospitalist, Internal Medicine)  IDENTIFYING INFORMATION   PATIENT:  Diego Sousa  MRN:  [id number]  ADMIT DATE: 4/23/2019  TIME OF EVALUATION: 4/24/2019 12:39 PM      HISTORY OF PRESENT ILLNESS   Jefferson Herman is a 69 y. o. 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 His function has declined since surgery in Jan 2019 but has been slowly improving with outpatient therapy. However, on Saturday, he reported feeling a heaviness in his mid chest with pain radiation into the left arm. He did not tell family. On Sunday, he felt tired and laid around in bed. On Monday, he went to PT and was placed on a bicycle where after a short session, became as \"white as a sheet\". BP measured was low in 90 SBP. Was advised to follow up with cardiology which he did today. On cardiology review in  clinic, he reports ongoing SOB on light exertion. SUBJECTIVE         MEDICATIONS   Medications Prior to Admission  Medications Prior to Admission: donepezil (ARICEPT) 10 MG tablet, Take 1 tablet by mouth nightly  atorvastatin (LIPITOR) 20 MG tablet, Take 1 tablet by mouth daily  pioglitazone (ACTOS) 45 MG tablet, Take 1 tablet by mouth daily  sertraline (ZOLOFT) 50 MG tablet, Take 1 tablet by mouth daily  SITagliptin (JANUVIA) 100 MG tablet, Take 1 tablet by mouth daily  levothyroxine (SYNTHROID) 50 MCG tablet, Take 1 tablet by mouth Daily  lactobacillus (CULTURELLE) capsule, Take 1 capsule by mouth daily (with breakfast)  lidocaine (LMX) 4 % cream, Apply topically as needed.   docusate sodium (COLACE, DULCOLAX) 100 MG CAPS, Take 100 mg by mouth 2 times daily  [employer] GAS RELIEF 80 MG chewable tablet, Take 1 tablet by mouth as needed  polyethylene glycol (GLYCOLAX) powder, Take 17 g by mouth 2 times daily  aspirin 81 MG EC Nightkatrina Montesinos MD   1 Units at 04/23/19 2135    glucose (GLUTOSE) 40 % oral gel 15 g  15 g Oral PRN Sue Montesinos MD        dextrose 50 % solution 12.5 g  12.5 g Intravenous PRN Sue Montesinos MD        glucagon (rDNA) injection 1 mg  1 mg Intramuscular PRN Sue Montesinos MD        dextrose 5 % solution  100 mL/hr Intravenous PRN Sue Montesinos MD        atorvastatin (LIPITOR) tablet 20 mg  20 mg Oral Nightly Sue Montesinos MD   20 mg at 04/23/19 2134    donepezil (ARICEPT) tablet 10 mg  10 mg Oral Nightly Sue Montesinos MD   10 mg at 04/23/19 2134    docusate sodium (COLACE) capsule 100 mg  100 mg Oral BID Sue Montesinos MD   100 mg at 04/24/19 0926    levothyroxine (SYNTHROID) tablet 50 mcg  50 mcg Oral Daily Sue Montesinos MD   50 mcg at 04/24/19 0519    sertraline (ZOLOFT) tablet 50 mg  50 mg Oral Daily Sue Montesinos MD   50 mg at 04/24/19 0926    polyethylene glycol (GLYCOLAX) packet 17 g  17 g Oral Daily Sue Montesinos MD        ipratropium-albuterol (DUONEB) nebulizer solution 1 ampule  1 ampule Inhalation Q4H WA Sue Montesinos MD   1 ampule at 04/24/19 1115    methylPREDNISolone sodium (SOLU-MEDROL) injection 40 mg  40 mg Intravenous Q12H Sue Montesinos MD   40 mg at 04/24/19 9488         Allergies  No Known Allergies    REVIEW OF SYSTEMS     Within above limitations. 14 point review of systems reviewed. Pertinent positive or negative as per HPI or otherwise negative per 14 point systems review. Reviewed 4/24/2019 at 12:39 PM    PHYSICAL EXAM       Blood pressure 116/70, pulse 73, temperature 97.7 °F (36.5 °C), temperature source Oral, resp. rate 30, height 6' 0.05\" (1.83 m), weight 196 lb 6.9 oz (89.1 kg), SpO2 97 %. General - AAO x 3  Psych - Appropriate affect/speech. No agitation  Eyes - Eye lids intact. No scleral icterus  ENT - Lips wnl. External ear clear/dry/intact.  No thyromegaly on inspection  Neuro - No gross peripheral or central neuro deficits on inspection  Heart - Sinus. RRR. S1 and S2 present. No elevated JVD appreciated  Lung - Adequate air entry b/l, No crackles/wheezes appreciated  GI -  Soft. No guarding/rigidity. BS+  Skin - Intact. No rash/petechiae/ecchymosis. Warm extremities      LABS AND IMAGING   CBC  [unfilled]    Last 3 Hemoglobin  Lab Results   Component Value Date    HGB 12.3 04/24/2019    HGB 13.3 04/23/2019    HGB 12.2 03/13/2019     Last 3 WBC/ANC  Lab Results   Component Value Date    WBC 10.2 04/24/2019    WBC 8.8 04/23/2019    WBC 7.4 03/13/2019     No components found for: GRNLOCTYABS  Last 3 Platelets  No results found for: PLATELET  Chemistry  [unfilled]  [unfilled]  No results found for: LDH  Coagulation Studies  Lab Results   Component Value Date    INR 1.08 02/24/2019     Liver Function Studies  Lab Results   Component Value Date    ALT 12 04/23/2019    AST 19 04/23/2019    ALKPHOS 60 04/23/2019       Recent Imaging    Jayme Finn #[id number] ([id number]) (69 y.o. M) (Adm: 04/23/19)    [address] [place], [place] [id number]         Imaging Results (last 7 days)          Procedure Component Value Ref Range Date/Time     CTA CHEST W CONTRAST [[id number]] Collected: 04/23/19 1234     Order Status: Completed Updated: 04/23/19 1312     Narrative:       EXAMINATION:  CTA OF THE CHEST, 4/23/2019 12:08 pm    TECHNIQUE:  CTA of the chest was performed after the administration of intravenous  contrast.  Multiplanar reformatted images are provided for review.  MIP  images are provided for review. Dose modulation, iterative reconstruction,  and/or weight based adjustment of the mA/kV was utilized to reduce the  radiation dose to as low as reasonably achievable.     COMPARISON:  02/24/2019 and 11/12/2018    HISTORY:  ORDERING SYSTEM PROVIDED HISTORY: SOB, check PNA, PE  TECHNOLOGIST PROVIDED HISTORY:  Ordering Physician Provided Reason for Exam: SOB, check PNA, PE  Acuity: Acute  Type of Exam: caregiver.             Relevant labs and imaging reviewed    ASSESSMENT AND PLAN     Chest pain   ALVES  - d/w outpatient cards admit for cardiac-pulm evaluation, CTA chest negative. Received IVF for CTA contrast ppx  - reviewed pre-op TTE and stress 11/2018 that was non acute  - due to high suspicion, completed LHC with CAD and s/p PCI LAD.  Now on DAPT  - plan for home tomorrow per post-cath protocol    Fatigue, tiredness  - could be deconditioning post lobectomy  - recent TFT 3/2019 normal    Interstitial lung disease  Possible JOES CRUZ  - follows with Dr Salena De Oliveira with plans for outpatient sleep study and associated pulmonary function testing    DMII  - hold oral med  - ISS for now     Hypothyroid on synthroid     Depression      Lovenox ppx      [facility], Internal Medicine  4/24/2019 at 12:39 PM

## 2019-04-24 NOTE — CONSULTS
Subjective:   CHIEF COMPLAINT / HPI:  69year old male admitted with increasing sob. He jhas mild cough without any sputum production. he had a ct chest done which is abnormal.he was started on duoneb and iv solumedrol and feels better today. He has s/s of caridad and scheduled for sleep study on may 2      Past Medical History:  Past Medical History:   Diagnosis Date    Arthritis     \"Back, Knees\"    Back pain     \"At Times\"    CAD (coronary artery disease)     Diabetes mellitus ([address]     dx 5+ yrs ago-     Glaucoma     Bilateral Eyes    H/O 24 hour EKG monitoring 11-    Predominantly SR - avg rate of 69bpm. Lowest rate 46 bpm at 0622. Frequent isolated 5954 PVCs noted mostly in bigemenal pattern w/out complex arrhythmias. Five beat run of atrial tachycardia at 1228. No palpitations or dizziness reported in patient's daily activity report. (12-, )    H/O cardiac catheterization 06-    Noncritical diffuse CAD w/no critical stenosis. Diag borderline significant stenosis, not large enough fo percutaneous intervention. No significant angiographic progression of atherosclerosis since cath in 1997.   ( per Dr. John Mosqueda at [facility]. Preserved vent function. MVP. CAD w/normal LM, 30-40% stenosis LAD, 70-80% stenosis of ostium & prox seg diag branch, normal left CX & RCA.)    H/O cardiovascular stress test 1/30/2013, 06- 1/30/2013-Normal study. Normal perfusion in all regions. EF 62%. 06--EF=60%. Normal. Normal pattern of perfusion. LV normal size. No wall abnormality. Exercise capacity good. (05-, 12-, 06-, , )    H/O chest pain     H/O chest x-ray 06-    Negative. Dx was dyspnea and CAD.  (02-)    H/O dizziness     H/O Doppler ultrasound 11-    (Carotid) Intimal thickening but no significant atherosclerotic plaque noted in right or left ICA. Doppler flow velocities w/in right and left ICA WNL.     H/O Past    COLONOSCOPY N/A 4/9/2019    COLONOSCOPY DIAGNOSTIC performed by Johnny Garcia MD at [facility]      Teeth Extracted In Past    EYE SURGERY Bilateral 2000's    \"Laser For Glaucoma\"    KNEE ARTHROSCOPY Bilateral 1970's To 1990's    \"Numerous\"    KNEE SURGERY Left 1980's Or 1990's    LUNG REMOVAL, TOTAL Left 1/28/2019    THORACOTOMY LEFT UPPER LOBECTOMY ROBOTIC ASSISTED performed by Raleigh Rosario MD at [facility]  1980's       Current Medications:    Current Facility-Administered Medications: zolpidem (AMBIEN) tablet 5 mg, 5 mg, Oral, Nightly PRN  0.9 % sodium chloride infusion, , Intravenous, Continuous  sodium chloride flush 0.9 % injection 10 mL, 10 mL, Intravenous, 2 times per day  sodium chloride flush 0.9 % injection 10 mL, 10 mL, Intravenous, PRN  acetaminophen (TYLENOL) tablet 650 mg, 650 mg, Oral, Q4H PRN  magnesium hydroxide (MILK OF MAGNESIA) 400 MG/5ML suspension 30 mL, 30 mL, Oral, Daily PRN  ondansetron (ZOFRAN) injection 4 mg, 4 mg, Intravenous, Q6H PRN  [START ON 4/25/2019] aspirin chewable tablet 81 mg, 81 mg, Oral, Daily  [START ON 4/25/2019] clopidogrel (PLAVIX) tablet 75 mg, 75 mg, Oral, Daily  isosorbide mononitrate (IMDUR) extended release tablet 30 mg, 30 mg, Oral, Daily  metoprolol tartrate (LOPRESSOR) tablet 25 mg, 25 mg, Oral, BID  enoxaparin (LOVENOX) injection 40 mg, 40 mg, Subcutaneous, Daily  insulin lispro (HUMALOG) injection vial 0-6 Units, 0-6 Units, Subcutaneous, TID WC  insulin lispro (HUMALOG) injection vial 0-3 Units, 0-3 Units, Subcutaneous, Nightly  glucose (GLUTOSE) 40 % oral gel 15 g, 15 g, Oral, PRN  dextrose 50 % solution 12.5 g, 12.5 g, Intravenous, PRN  glucagon (rDNA) injection 1 mg, 1 mg, Intramuscular, PRN  dextrose 5 % solution, 100 mL/hr, Intravenous, PRN  atorvastatin (LIPITOR) tablet 20 mg, 20 mg, Oral, Nightly  donepezil (ARICEPT) tablet 10 mg, 10 mg, Oral, Nightly  docusate sodium (COLACE) capsule 100 mg, 100 mg, Oral, BID  levothyroxine (SYNTHROID) tablet 50 mcg, 50 mcg, Oral, Daily  sertraline (ZOLOFT) tablet 50 mg, 50 mg, Oral, Daily  polyethylene glycol (GLYCOLAX) packet 17 g, 17 g, Oral, Daily  ipratropium-albuterol (DUONEB) nebulizer solution 1 ampule, 1 ampule, Inhalation, Q4H WA  methylPREDNISolone sodium (SOLU-MEDROL) injection 40 mg, 40 mg, Intravenous, Q12H    No Known Allergies    Social History:    Social History     Socioeconomic History    Marital status:      Spouse name: Not on file    Number of children: 3    Years of education: Not on file    Highest education level: Not on file   Occupational History    Occupation: Retired     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   Social Needs    Financial resource strain: Not on file    Food insecurity:     Worry: Not on file     Inability: Not on file   Aneumed needs:     Medical: Not on file     Non-medical: Not on file   Tobacco Use    Smoking status: Never Smoker    Smokeless tobacco: Never Used   Substance and Sexual Activity    Alcohol use: Yes     Comment: \"Maybe Once A Month\"    Drug use: No    Sexual activity: Never   Lifestyle    Physical activity:     Days per week: Not on file     Minutes per session: Not on file    Stress: Not on file   Relationships    Social connections:     Talks on phone: Not on file     Gets together: Not on file     Attends [employer] service: Not on file     Active member of club or organization: Not on file     Attends meetings of clubs or organizations: Not on file     Relationship status: Not on file    Intimate partner violence:     Fear of current or ex partner: Not on file     Emotionally abused: Not on file     Physically abused: Not on file     Forced sexual activity: Not on file   Other Topics Concern    Not on file   Social History Narrative    Not on file       Family History:   Family History   Problem Relation Age of Onset    Heart Disease Mother         Enlarged Heart    High Blood CONSTITUTIONAL:  awake, alert, cooperative, no apparent distress, and appears stated age  NECK:  Supple, symmetrical, trachea midline, no adenopathy, thyroid symmetric, not enlarged and no tenderness  CHEST: Chest expansion equal and symmetrical, no intercostal retraction. LUNGS:  no increased work of breathing, has expiratory wheezes both lungs, no crackles. CARDIOVASCULAR: S1 and S2, no edema and no JVD  ABDOMEN:  normal bowel sounds, non-distended and no masses palpated, and no tenderness to palpation. No hepatospleenomegaly  LYMPHADENOPATHY:  no axillary or supraclavicular adenopathy. No cervical adnenopathy  PSYCHIATRIC: Oriented to person place and time. No obvious depression or anxiety. MUSCULOSKELETAL: No obvious misalignment or effusion of the joints. No clubbing, cyanosis of the digits. RIGHT AND LEFT LOWER EXTREMITIES: No edema, no inflammation, no tenderness. SKIN:  normal skin color, texture, turgor and no redness, warmth, or swelling. No palpable nodules    DATA:         EXAMINATION:   CTA OF THE CHEST, 4/23/2019 12:08 pm       TECHNIQUE:   CTA of the chest was performed after the administration of intravenous   contrast.  Multiplanar reformatted images are provided for review.  MIP   images are provided for review.  Dose modulation, iterative reconstruction,   and/or weight based adjustment of the mA/kV was utilized to reduce the   radiation dose to as low as reasonably achievable.       COMPARISON:   02/24/2019 and 11/12/2018       HISTORY:   ORDERING SYSTEM PROVIDED HISTORY: SOB, check PNA, PE   TECHNOLOGIST PROVIDED HISTORY:   Ordering Physician Provided Reason for Exam: SOB, check PNA, PE   Acuity: Acute   Type of Exam: Initial   Additional signs and symptoms: None   Relevant Medical/Surgical History: 75 mL Isovue 370/GFR>60       FINDINGS:   Pulmonary Arteries: Pulmonary arteries are adequately opacified for   evaluation.  However suboptimal evaluation of the distal segmental and and wife  2. o2 as needed  3. Bd rx  4. Iv steroids  5. Review pft  6. Thanks will follow  7.  He had a left pleural tap done last month and cytology was negative

## 2019-04-25 VITALS
HEIGHT: 72 IN | OXYGEN SATURATION: 94 % | DIASTOLIC BLOOD PRESSURE: 69 MMHG | TEMPERATURE: 98 F | RESPIRATION RATE: 30 BRPM | SYSTOLIC BLOOD PRESSURE: 135 MMHG | HEART RATE: 72 BPM | WEIGHT: 199.6 LBS | BODY MASS INDEX: 27.04 KG/M2

## 2019-04-25 LAB
CULTURE: NORMAL
GLUCOSE BLD-MCNC: 171 MG/DL (ref 70–99)
HCT VFR BLD CALC: 34 % (ref 42–52)
HEMOGLOBIN: 10.4 GM/DL (ref 13.5–18)
Lab: NORMAL
MCH RBC QN AUTO: 29.8 PG (ref 27–31)
MCHC RBC AUTO-ENTMCNC: 30.6 % (ref 32–36)
MCV RBC AUTO: 97.4 FL (ref 78–100)
PDW BLD-RTO: 14.7 % (ref 11.7–14.9)
PLATELET # BLD: 177 K/CU MM (ref 140–440)
PMV BLD AUTO: 11.2 FL (ref 7.5–11.1)
RBC # BLD: 3.49 M/CU MM (ref 4.6–6.2)
SPECIMEN: NORMAL
WBC # BLD: 13.4 K/CU MM (ref 4–10.5)

## 2019-04-25 PROCEDURE — APPSS30 APP SPLIT SHARED TIME 16-30 MINUTES: Performed by: NURSE PRACTITIONER

## 2019-04-25 PROCEDURE — 85027 COMPLETE CBC AUTOMATED: CPT

## 2019-04-25 PROCEDURE — 82962 GLUCOSE BLOOD TEST: CPT

## 2019-04-25 PROCEDURE — 2580000003 HC RX 258: Performed by: INTERNAL MEDICINE

## 2019-04-25 PROCEDURE — 6370000000 HC RX 637 (ALT 250 FOR IP): Performed by: INTERNAL MEDICINE

## 2019-04-25 PROCEDURE — 94640 AIRWAY INHALATION TREATMENT: CPT

## 2019-04-25 PROCEDURE — 6360000002 HC RX W HCPCS: Performed by: INTERNAL MEDICINE

## 2019-04-25 PROCEDURE — 36415 COLL VENOUS BLD VENIPUNCTURE: CPT

## 2019-04-25 PROCEDURE — 94761 N-INVAS EAR/PLS OXIMETRY MLT: CPT

## 2019-04-25 PROCEDURE — 99232 SBSQ HOSP IP/OBS MODERATE 35: CPT | Performed by: INTERNAL MEDICINE

## 2019-04-25 RX ORDER — PREDNISONE 20 MG/1
TABLET ORAL
Qty: 14 TABLET | Refills: 0 | Status: SHIPPED | OUTPATIENT
Start: 2019-04-25 | End: 2019-07-05

## 2019-04-25 RX ORDER — ASPIRIN 81 MG/1
81 TABLET ORAL EVERY MORNING
Qty: 90 TABLET | Refills: 1 | Status: SHIPPED | OUTPATIENT
Start: 2019-04-25

## 2019-04-25 RX ORDER — ISOSORBIDE MONONITRATE 30 MG/1
30 TABLET, EXTENDED RELEASE ORAL DAILY
Qty: 90 TABLET | Refills: 1 | Status: SHIPPED | OUTPATIENT
Start: 2019-04-26 | End: 2019-11-06 | Stop reason: ALTCHOICE

## 2019-04-25 RX ORDER — CLOPIDOGREL BISULFATE 75 MG/1
75 TABLET ORAL DAILY
Qty: 90 TABLET | Refills: 1 | Status: SHIPPED | OUTPATIENT
Start: 2019-04-26 | End: 2019-07-05 | Stop reason: SDUPTHER

## 2019-04-25 RX ADMIN — ISOSORBIDE MONONITRATE 30 MG: 30 TABLET, EXTENDED RELEASE ORAL at 08:19

## 2019-04-25 RX ADMIN — SODIUM CHLORIDE: 9 INJECTION, SOLUTION INTRAVENOUS at 05:39

## 2019-04-25 RX ADMIN — INSULIN LISPRO 1 UNITS: 100 INJECTION, SOLUTION INTRAVENOUS; SUBCUTANEOUS at 08:17

## 2019-04-25 RX ADMIN — DOCUSATE SODIUM 100 MG: 100 CAPSULE, LIQUID FILLED ORAL at 08:19

## 2019-04-25 RX ADMIN — ASPIRIN 81 MG 81 MG: 81 TABLET ORAL at 08:19

## 2019-04-25 RX ADMIN — ENOXAPARIN SODIUM 40 MG: 40 INJECTION SUBCUTANEOUS at 08:18

## 2019-04-25 RX ADMIN — METHYLPREDNISOLONE SODIUM SUCCINATE 40 MG: 40 INJECTION, POWDER, FOR SOLUTION INTRAMUSCULAR; INTRAVENOUS at 05:39

## 2019-04-25 RX ADMIN — METOPROLOL TARTRATE 12.5 MG: 25 TABLET ORAL at 08:19

## 2019-04-25 RX ADMIN — SERTRALINE HYDROCHLORIDE 50 MG: 50 TABLET ORAL at 08:19

## 2019-04-25 RX ADMIN — IPRATROPIUM BROMIDE AND ALBUTEROL SULFATE 1 AMPULE: .5; 3 SOLUTION RESPIRATORY (INHALATION) at 07:17

## 2019-04-25 RX ADMIN — CLOPIDOGREL BISULFATE 75 MG: 75 TABLET ORAL at 08:19

## 2019-04-25 RX ADMIN — LEVOTHYROXINE SODIUM 50 MCG: 50 TABLET ORAL at 05:39

## 2019-04-25 RX ADMIN — POLYETHYLENE GLYCOL (3350) 17 G: 17 POWDER, FOR SOLUTION ORAL at 08:18

## 2019-04-25 ASSESSMENT — PAIN SCALES - GENERAL: PAINLEVEL_OUTOF10: 0

## 2019-04-25 NOTE — PROGRESS NOTES
pulmonary      SUBJECTIVE: no sob at rest     OBJECTIVE    VITALS:  /68   Pulse (!) 47   Temp 98 °F (36.7 °C) (Oral)   Resp 17   Ht 6' (1.829 m)   Wt 199 lb 9.6 oz (90.5 kg)   SpO2 97%   BMI 27.07 kg/m²   HEAD AND FACE EXAM:  No throat injection, no active exudate,no thrush  NECK EXAM;No JVD, no masses, symmetrical  CHEST EXAM; Expansion equal and symmetrical, no masses  LUNG EXAM; Good breath sounds bilaterally. There are expiratory wheezes both lungs, there are crackles at both lung bases  CARDIOVASCULAR EXAM: Positive S1 and S2, no S3 or S4, no clicks ,no murmurs  RIGHT AND LEFT LOWER EXTRIMITY EXAM: No edema, no swelling, no inflamation  CNS EXAM: Alert and oriented X3          LABS   Lab Results   Component Value Date    WBC 13.4 (H) 04/25/2019    HGB 10.4 (L) 04/25/2019    HCT 34.0 (L) 04/25/2019    MCV 97.4 04/25/2019     04/25/2019     Lab Results   Component Value Date    CREATININE 1.0 04/24/2019    BUN 19 04/24/2019     04/24/2019    K 4.3 04/24/2019     04/24/2019    CO2 23 04/24/2019     Lab Results   Component Value Date    INR 1.08 02/24/2019    PROTIME 12.3 02/24/2019        No results found for: PHOS   No results for input(s): PH, PO2ART, REQ2KQI, HCO3, BEART, O2SAT in the last 72 hours. Wt Readings from Last 3 Encounters:   04/25/19 199 lb 9.6 oz (90.5 kg)   04/23/19 196 lb 6.4 oz (89.1 kg)   04/15/19 198 lb (89.8 kg)               ASSESMENT  pulm fibrosis  Ac bronchospasm  Poss asthma  prob caridad        PLAN  1. Sleep study scheduled as outpt  2. pft done. Will review  3. cpm  4. Taper steroids  5. Requesting to have nebulizer machine and rx at home.  I will arrange it    4/25/2019  Emma Alonzo M.D.

## 2019-04-25 NOTE — PROGRESS NOTES
Affect and Mood :pleasant     Medications:    metoprolol tartrate  12.5 mg Oral BID    sodium chloride flush  10 mL Intravenous 2 times per day    aspirin  81 mg Oral Daily    clopidogrel  75 mg Oral Daily    isosorbide mononitrate  30 mg Oral Daily    enoxaparin  40 mg Subcutaneous Daily    insulin lispro  0-6 Units Subcutaneous TID WC    insulin lispro  0-3 Units Subcutaneous Nightly    atorvastatin  20 mg Oral Nightly    donepezil  10 mg Oral Nightly    docusate sodium  100 mg Oral BID    levothyroxine  50 mcg Oral Daily    sertraline  50 mg Oral Daily    polyethylene glycol  17 g Oral Daily    ipratropium-albuterol  1 ampule Inhalation Q4H WA    methylPREDNISolone  40 mg Intravenous Q12H      sodium chloride 75 mL/hr at 04/25/19 0539    dextrose       zolpidem, sodium chloride flush, acetaminophen, magnesium hydroxide, ondansetron, glucose, dextrose, glucagon (rDNA), dextrose    Lab Data:  CBC:   Recent Labs     04/23/19  1035 04/24/19  0710 04/25/19  0330   WBC 8.8 10.2 13.4*   HGB 13.3* 12.3* 10.4*   HCT 43.6 38.9* 34.0*   MCV 96.7 94.6 97.4    196 177     BMP:   Recent Labs     04/23/19  1035 04/24/19  0710    138   K 5.1 4.3    104   CO2 26 23   BUN 18 19   CREATININE 1.1 1.0     PT/INR: No results for input(s): PROTIME, INR in the last 72 hours. BNP:  No results for input(s): PROBNP in the last 72 hours. TROPONIN:   Recent Labs     04/23/19  1417 04/23/19 2002   TROPONINT <0.010 <0.010          Left heart cath 04/24/2019  Procedure Summary   Access : Radial   Indication: unstable angina Class IV   1. FFR of proximal LAD was 0.78   2. PCI to Proximal LAD using 3.25 X 38 KUMAR inflated to 18 atms ,   distal LAD towards apex has 90 % lesion   3. mid circ has 20- 30 % lesion   4. RCA is patent but small       Impression:  Active Problems:    Chest pain    Angina at rest [facility])  Resolved Problems:    * No resolved hospital problems.  *       All labs, medications and tests reviewed by myself, continue all other medications of all above medical condition listed as is except for changes mentioned above. Thank you   Please call with questions. Electronically signed by ASHLEE WESTFALL CNP on 4/25/2019 at 9:50 AM   I have seen ,spoken to  and examined this patient personally, independently of the nurse practitioner. I have reviewed the hospital care given to date and reviewed all pertinent labs and imaging. The plan was developed mutually at the time of the visit with the patient, Clinical Nurse Practitioner  and myself. I have spoken with patient, nursing staff and provided written and verbal instructions . The above note has been reviewed and I agree with the assessment, diagnosis, and treatment plan with changes made by me as follows     CARDIOLOGY ATTENDING ADDENDUM    HPI:  I have reviewed the above HPI  And agree with above   Zackery Jaime is a 69 y. o.year old who and presents with had no chief complaint listed for this encounter. No chief complaint on file. Interval history:  Better, says not slept for three nights, wants to go home. Physical Exam:  General:  Awake, alert, NAD  Head:normal  Eye:normal  Neck:  No JVD   Chest:  Clear to auscultation, respiration easy  Cardiovascular:  RRR S1S2  Abdomen:   nontender  Extremities:  no edema  Pulses; palpable  Neuro: grossly normal      MEDICAL DECISION MAKING;    I agree with the above plan, which was planned by myself and discussed with NP.       Matteo Hernandez MD [facility]

## 2019-04-25 NOTE — DISCHARGE SUMMARY
Mary Donahue 1940 [id number]  PCP:  Billy Mims MD    Admit date: 4/23/2019  Admitting Physician: German Gusman MD    Discharge date: 4/25/2019 Discharge Physician: German Gusman MD      Reason for admission: No chief complaint on file. Present on Admission:   Chest pain   Angina at rest [facility])       Discharge Diagnoses & Hospital Course[de-identified]       Chest pain   ALVES  - d/w outpatient cards admit for cardiac-pulm evaluation, CTA chest negative. Received IVF for CTA contrast ppx  - reviewed pre-op TTE and stress 11/2018 that was non acute  - due to high suspicion, completed LHC with CAD and s/p PCI LAD. Now on DAPT  - outpatient f/u with cards      Fatigue, tiredness  - could be deconditioning post lobectomy  - recent TFT 3/2019 normal     Interstitial lung disease  Possible JOSE CRUZ  - follows with Dr Beau Carlson with plans for outpatient sleep study and associated pulmonary function testing  - wean steroids for possible bronchospasm  - outpatient pulm f/u     DMII  - resume home med    Hypothyroid on synthroid     Depression              Exam:   Wt Readings from Last 3 Encounters:   04/25/19 199 lb 9.6 oz (90.5 kg)   04/23/19 196 lb 6.4 oz (89.1 kg)   04/15/19 198 lb (89.8 kg)       Blood pressure 135/69, pulse 72, temperature 98 °F (36.7 °C), temperature source Oral, resp. rate 30, height 6' (1.829 m), weight 199 lb 9.6 oz (90.5 kg), SpO2 94 %. General - AAO x 3  Psych - Appropriate affect/speech. No agitation  Eyes - Eye lids intact. No scleral icterus  ENT - Lips wnl. External ear clear/dry/intact. No thyromegaly on inspection  Neuro - No gross peripheral or central neuro deficits on inspection  Heart - Sinus. RRR. S1 and S2 present. No elevated JVD appreciated  Lung - Adequate air entry b/l, No crackles/wheezes appreciated  GI -  Soft. No guarding/rigidity. No hepatosplenomegaly/ascites. BS+   - No CVA/suprapubic tenderness or palpable bladder distension  Skin - Intact.  No with     Follow-up With  Details  Why  Contact Info   Keshawn Landeros MD      [address]  [address]   Albino Saavedra MD  Schedule an appointment as soon as possible for a visit in 1 week  call to follow up with card in 1 week for post hospitalization check   [address] [address]  [phone]   Jose Luis Taveras MD  Schedule an appointment as soon as possible for a visit in 2 weeks  call to follow up with pulmonology in 2 weeks post hospitalizatoin check   [facility]  [phone]            Discharge Physician Signed:   [address] [facility], Internal medicine  4/25/2019 at 10:08 AM    The patient was seen and examined on day of discharge and this discharge summary is in conjunction with any daily progress note from day of discharge.   Time spent on discharge in the examination, evaluation, counseling and review of medications and discharge plan: <30 minutes    Please forward this discharge summary to patient's PCP

## 2019-04-25 NOTE — PLAN OF CARE
Problem: Falls - Risk of:  Goal: Will remain free from falls  Description  Will remain free from falls  Outcome: Ongoing  Goal: Absence of physical injury  Description  Absence of physical injury  Outcome: Ongoing     Problem: Pain:  Goal: Pain level will decrease  Description  Pain level will decrease  Outcome: Ongoing  Goal: Control of acute pain  Description  Control of acute pain  Outcome: Ongoing  Goal: Control of chronic pain  Description  Control of chronic pain  Outcome: Ongoing     Problem: Discharge Planning:  Goal: Discharged to appropriate level of care  Description  Discharged to appropriate level of care  Outcome: Ongoing

## 2019-05-02 ENCOUNTER — HOSPITAL ENCOUNTER (OUTPATIENT)
Dept: SLEEP CENTER | Age: 79
Discharge: HOME | End: 2019-05-02
Payer: MEDICARE

## 2019-05-03 NOTE — PROGRESS NOTES
5/3/2019  sleep study  for Theo Turner  1940 is complete. Results are pending physician review.     Electronically signed by Angy Bower on 5/3/2019 at 7:49 AM

## 2019-05-06 ENCOUNTER — OFFICE VISIT (OUTPATIENT)
Dept: CARDIOLOGY CLINIC | Age: 79
End: 2019-05-06
Payer: MEDICARE

## 2019-05-06 VITALS
DIASTOLIC BLOOD PRESSURE: 70 MMHG | WEIGHT: 197.4 LBS | HEIGHT: 72 IN | HEART RATE: 76 BPM | SYSTOLIC BLOOD PRESSURE: 100 MMHG | BODY MASS INDEX: 26.74 KG/M2

## 2019-05-06 DIAGNOSIS — R06.02 SOB (SHORTNESS OF BREATH) ON EXERTION: ICD-10-CM

## 2019-05-06 DIAGNOSIS — I25.110 CORONARY ARTERY DISEASE INVOLVING NATIVE CORONARY ARTERY OF NATIVE HEART WITH UNSTABLE ANGINA PECTORIS (HCC): Primary | ICD-10-CM

## 2019-05-06 PROCEDURE — G8417 CALC BMI ABV UP PARAM F/U: HCPCS | Performed by: INTERNAL MEDICINE

## 2019-05-06 PROCEDURE — 4040F PNEUMOC VAC/ADMIN/RCVD: CPT | Performed by: INTERNAL MEDICINE

## 2019-05-06 PROCEDURE — G8598 ASA/ANTIPLAT THER USED: HCPCS | Performed by: INTERNAL MEDICINE

## 2019-05-06 PROCEDURE — 1123F ACP DISCUSS/DSCN MKR DOCD: CPT | Performed by: INTERNAL MEDICINE

## 2019-05-06 PROCEDURE — 99214 OFFICE O/P EST MOD 30 MIN: CPT | Performed by: INTERNAL MEDICINE

## 2019-05-06 PROCEDURE — 1036F TOBACCO NON-USER: CPT | Performed by: INTERNAL MEDICINE

## 2019-05-06 PROCEDURE — 1111F DSCHRG MED/CURRENT MED MERGE: CPT | Performed by: INTERNAL MEDICINE

## 2019-05-06 PROCEDURE — 93000 ELECTROCARDIOGRAM COMPLETE: CPT | Performed by: INTERNAL MEDICINE

## 2019-05-06 PROCEDURE — G8427 DOCREV CUR MEDS BY ELIG CLIN: HCPCS | Performed by: INTERNAL MEDICINE

## 2019-05-06 NOTE — PROGRESS NOTES
CARDIOLOGY  NOTE    Chief Complaint: Chest pain and shortness of breath    HPI:   Kashmir Phillip is a 69y.o. year old who has history as noted below. He is here after LAD stent due to abnormal FFR in April 2019 . He as having a lot of chest pain but symptoms are better now . He is struggling with depression as well. CT chest shows left lung post resection changes and also loculated effusion . Norma Villegas He feels He is tired a lot. He cannot sleep. Norma Villegas He had lung surgery due to lung mass and Jan 2019. He says that he has not felt good since then. He has had pleural tap once but he feels breathing is not good      Current Outpatient Medications   Medication Sig Dispense Refill    aspirin 81 MG EC tablet Take 1 tablet by mouth every morning Over The Counter 90 tablet 1    metoprolol tartrate (LOPRESSOR) 25 MG tablet Take 0.5 tablets by mouth 2 times daily 90 tablet 1    isosorbide mononitrate (IMDUR) 30 MG extended release tablet Take 1 tablet by mouth daily 90 tablet 1    clopidogrel (PLAVIX) 75 MG tablet Take 1 tablet by mouth daily 90 tablet 1    predniSONE (DELTASONE) 20 MG tablet Prednisone taper - 40mg QD for 4 days, 20mg QD for 4 days, 20 mg every other day for 4 days 14 tablet 0    donepezil (ARICEPT) 10 MG tablet Take 1 tablet by mouth nightly 90 tablet 1    atorvastatin (LIPITOR) 20 MG tablet Take 1 tablet by mouth daily 90 tablet 1    pioglitazone (ACTOS) 45 MG tablet Take 1 tablet by mouth daily 90 tablet 1    sertraline (ZOLOFT) 50 MG tablet Take 1 tablet by mouth daily 90 tablet 1    SITagliptin (JANUVIA) 100 MG tablet Take 1 tablet by mouth daily 90 tablet 1    levothyroxine (SYNTHROID) 50 MCG tablet Take 1 tablet by mouth Daily 90 tablet 1    lactobacillus (CULTURELLE) capsule Take 1 capsule by mouth daily (with breakfast) 30 capsule 3    lidocaine (LMX) 4 % cream Apply topically as needed.  1 Tube 2    docusate sodium (COLACE, DULCOLAX) 100 MG CAPS Take 100 mg by mouth 2 times daily 60 capsule 5    [employer]S GAS RELIEF 80 MG chewable tablet Take 1 tablet by mouth as needed  0    polyethylene glycol (GLYCOLAX) powder Take 17 g by mouth 2 times daily  6    Cholecalciferol (VITAMIN D3) 5000 units TABS Take by mouth every morning Over The Counter      Cyanocobalamin (VITAMIN B-12 PO) Take 2,500 mcg by mouth every morning Over The Counter      Naproxen Sod-Diphenhydramine (ALEVE PM PO) Take by mouth as needed Over The Counter       No current facility-administered medications for this visit. Allergies:   Patient has no known allergies. Patient History:  Past Medical History:   Diagnosis Date    Arthritis     \"Back, Knees\"    Back pain     \"At Times\"    CAD (coronary artery disease)     Diabetes mellitus ([address]     dx 5+ yrs ago-     Glaucoma     Bilateral Eyes    H/O 24 hour EKG monitoring 11-    Predominantly SR - avg rate of 69bpm. Lowest rate 46 bpm at 0622. Frequent isolated 5954 PVCs noted mostly in bigemenal pattern w/out complex arrhythmias. Five beat run of atrial tachycardia at 1228. No palpitations or dizziness reported in patient's daily activity report. (12-, )    H/O cardiac catheterization 06-    Noncritical diffuse CAD w/no critical stenosis. Diag borderline significant stenosis, not large enough fo percutaneous intervention. No significant angiographic progression of atherosclerosis since cath in 1997.   ( per Dr. Aden Hodgkins at [facility]. Preserved vent function. MVP. CAD w/normal LM, 30-40% stenosis LAD, 70-80% stenosis of ostium & prox seg diag branch, normal left CX & RCA.)    H/O cardiovascular stress test 1/30/2013, 06- 1/30/2013-Normal study. Normal perfusion in all regions. EF 62%. 06--EF=60%. Normal. Normal pattern of perfusion. LV normal size. No wall abnormality. Exercise capacity good.  (05-, 12-, 06-, , )    H/O chest pain     H/O chest x-ray 06- Negative. Dx was dyspnea and CAD.  (02-)    H/O dizziness     H/O Doppler ultrasound 11-    (Carotid) Intimal thickening but no significant atherosclerotic plaque noted in right or left ICA. Doppler flow velocities w/in right and left ICA WNL.  H/O echocardiogram 1/30/2013, 05-    1/30/2013- LVSF normal. EF 50-55%. Impaired LV relaxation. Trace MR. Trace TR;   5- Normal LV size and systolic function. EF=60%. Chamber dimensions WNL. Mild concentric LV hypertrophy. Valves appear structurally normal.-OICC       H/O gastroesophageal reflux (GERD)     H/O pneumothorax Dx 1960    \"Both Sides\"    History of cardiac monitoring 12/03/2018    9 beat run of SVT, no other arrhythmias noted, primarily sinus rythym    History of complete ECG 06/06/2011    (06-, 07-, 06-, 06-)    History of stress test 11/07/2018    EF 61%, Normal    [place] (hard of hearing)     Bilateral Hearing Aids    Hx of CT scan of chest 11/12/2018    3 mm indeterminate solid nonobstructing endobronchial nodule of the proximal left upper lobe bronchus. Further evaluation with endoscopy is recommended. Bilateral basilar predominant intersitial changes suggestive of nonspecific interstitial pneumonitis.     Hx of Doppler echocardiogram 11/20/2018    EF55-60%,no valvular disease    Hyperlipidemia     Hypertension     Left Lung Cancer Dx 11-18    LEFT UPPER LOBECTOMY 1/28/19    Lung nodule     \"they say I have a spot on my lung- and mass in left lung so thats why doing the bronch\"( 12/7/2018)    Memory loss     MVP (mitral valve prolapse)     follows with Dr Sam Kwok (myocardial infarction)     \"had heart attack 30 yrs ago a mild one\"    Shortness of breath on exertion     Teeth missing     Upper And Lower    Thyroid disease     Wears dentures     Full Upper    Wears glasses     Wears hearing aid     Bilateral Ears     Past Surgical History:   Procedure Laterality Date    BRONCHOSCOPY N/A 12/7/2018    BRONCHOSCOPY/TRANSBRONCHIAL LUNG BIOPSY performed by Lani Landeros MD at [address]  [address]'[address] Or 1990's    CHOLECYSTECTOMY, LAPAROSCOPIC  2002    COLONOSCOPY  Last Done In 2006    Polyps Removed In Past    COLONOSCOPY N/A 4/9/2019    COLONOSCOPY DIAGNOSTIC performed by Delfino Stanley MD at [address] [facility]      Teeth Extracted In Past    EYE SURGERY Bilateral 2000's    \"Laser For Glaucoma\"    KNEE ARTHROSCOPY Bilateral 1970's To 1990's    \"Numerous\"    KNEE SURGERY Left 1980's Or 1990's    LUNG REMOVAL, TOTAL Left 1/28/2019    THORACOTOMY LEFT UPPER LOBECTOMY ROBOTIC ASSISTED performed by Reta Rice MD at [address]  1980's     Family History   Problem Relation Age of Onset    Heart Disease Mother         Enlarged Heart    High Blood Pressure Mother     Heart Attack Father     Stroke Father     COPD Sister     Diabetes Brother      Social History     Tobacco Use    Smoking status: Never Smoker    Smokeless tobacco: Never Used   Substance Use Topics    Alcohol use: Yes     Comment: \"Maybe Once A Month\"        Review of Systems:   · Constitutional: No Fever or Weight Loss   · Eyes: No Decreased Vision  · ENT: No Headaches, Hearing Loss or Vertigo  · Cardiovascular: as per note above   · Respiratory: as per HPI  · Gastrointestinal: No abdominal pain, appetite loss, blood in stools, constipation, diarrhea or heartburn  · Genitourinary: No dysuria, trouble voiding, or hematuria  · Musculoskeletal:  None  · Integumentary: No rash or pruritis  · Neurological: No TIA or stroke symptoms  · Psychiatric: insomnia  · Endocrine: No malaise, fatigue or temperature intolerance  · Hematologic/Lymphatic: No bleeding problems, blood clots or swollen lymph nodes  · Allergic/Immunologic: No nasal congestion or hives    Objective:      Physical Exam:  /70   Pulse 76   Ht 6' (1.829 m)   Wt 197 lb 6.4 oz (89.5 kg) BMI 26.77 kg/m²   Wt Readings from Last 3 Encounters:   05/06/19 197 lb 6.4 oz (89.5 kg)   04/25/19 199 lb 9.6 oz (90.5 kg)   04/23/19 196 lb 6.4 oz (89.1 kg)     Body mass index is 26.77 kg/m². Vitals:    05/06/19 1000   BP: 100/70   Pulse: 76        General Appearance:  No distress, conversant  Constitutional:  Well developed, Well nourished, No acute distress, Non-toxic appearance. HENT:  Normocephalic, Atraumatic, Bilateral external ears normal, Oropharynx moist, No oral exudates, Nose normal. Neck- Normal range of motion, No tenderness, Supple, No stridor,no apical-carotid delay  Eyes:  PERRL, EOMI, Conjunctiva normal, No discharge. Respiratory:  Normal breath sounds, No respiratory distress, No wheezing, No chest tenderness. ,no use of accessory muscles, NO crackles  Cardiovascular: (PMI) apex non displaced,no lifts no thrills,S1 and S2 audible, No added heart sounds, No signs of ankle edema, or volume overload, No evidence of JVD, No crackles  GI:  Bowel sounds normal, Soft, No tenderness, No masses, No gross visceromegaly   :  No costovertebral angle tenderness   Musculoskeletal:  No edema, no tenderness, no deformities.  Back- no tenderness  Integument:  Well hydrated, no rash   Lymphatic:  No lymphadenopathy noted   Neurologic:  Alert & oriented x 3, CN 2-12 normal, normal motor function, normal sensory function, no focal deficits noted   Psychiatric:  Speech and behavior appropriate       Medical decision making and Data review:  DATA:  Lab Results   Component Value Date    TROPONINT <0.010 04/23/2019     BNP:    Lab Results   Component Value Date    PROBNP 140.2 02/24/2019     PT/INR:  No results found for: PTINR  Lab Results   Component Value Date    LABA1C 7.1 11/02/2018    LABA1C 7.2 08/02/2018     Lab Results   Component Value Date    CHOL 117 11/02/2018    TRIG 178 (H) 11/02/2018    HDL 43 11/02/2018    LDLCALC 54 03/12/2013     Lab Results   Component Value Date    ALT 12 04/23/2019    AST 19 04/23/2019     TSH:   Lab Results   Component Value Date    TSH 2.370 11/02/2018     Lab Results   Component Value Date    AST 19 04/23/2019    ALT 12 04/23/2019    BILIDIR 0.2 04/23/2019    BILITOT 0.5 04/23/2019    ALKPHOS 60 04/23/2019     Lab Results   Component Value Date    PROBNP 140.2 02/24/2019    PROBNP 110.0 11/07/2018     Lab Results   Component Value Date    LABA1C 7.1 11/02/2018    LABA1C 7.2 08/02/2018     Lab Results   Component Value Date    WBC 13.4 (H) 04/25/2019    HGB 10.4 (L) 04/25/2019    HCT 34.0 (L) 04/25/2019     04/25/2019   echo 11*/20/18   Summary   Normal left ventricle structure and systolic function with an ejection   fraction of 55-60%. Grade I diastolic dysfunction. No significant valvular disease noted. No evidence of pericardial effusion. Essentially unremarkable echo. Stress test 11/20/18     Pippa Baptiste .   [place] portion of stress test is negative for ischemia by diagnostic criteria.    Normal EF 61 % with normal ventricular contractility.    No infarct or ischemia noted.    Normal stress myocardial perfusion.    Normal study     holter 12/3/18  9 beat run of SVT , no other arrhythmias noted , No diary provided, primarily sinus rythym  Clinical correlation needed    Cath 4/23/19  Conclusions      Procedure Summary   Access : Radial   Indication: unstable angina Class IV   1. FFR of proximal LAD was 0.78   2. PCI to Proximal LAD using 3.25 X 38 KUMAR inflated to 18 atms ,   distal LAD towards apex has 90 % lesion   3. mid circ has 20- 30 % lesion   4. RCA is patent but small       Angiographic Findings    Diagnostic Findings      Cardiac Arteries and Lesion Findings     LMCA: Normal, Normal (no stenosis %) and No Angiographicalyl Significant  Disease. heavily calcified    LAD: Abnormal.proximal segment has long 70 to 80 % lesion, first diag is  diffusely diseased has 80% lesion , distal lad at apex has 90 % lesion ( small  vessel after apex       Lesion on Prox LAD: 80% stenosis. Culprit lesion.       Devices used       - [place] Pressure Wire. Number of passes: 1.       - 3.25 x 38 RX 10 [facility] KUMAR. 2 inflation(s) to a max pressure of:    20 kian.     LCx: Normal, Normal (no stenosis %) and No Angiographicalyl Significant  Disease. mid circ at OM take off has 20 to 30 % stenosis, OM 1 is widely patent    RCA: Normal (no stenosis %), No Angiographicalyl Significant Disease and Mild  Lumen Irregularity. diffusely calcified small calibre vessel but widely patent        All labs, medications and tests reviewed by myself including data and history from outside source , patient and available family . Assessment & Plan:      1. Coronary artery disease involving native coronary artery of native heart with unstable angina pectoris ([address]    2. SOB (shortness of breath) on exertion           Chest pain  Chest pain improved after PCI to LAD , He still has apical LAD disease we will manage it medically     ASCVD (arteriosclerotic cardiovascular disease)  S/p PCi to LAd in April 2019 . Continue DAPT for 6 months , refer to cardiac rehab , continue Imdur , HE dose not want to do treadmill      Dyslipidemia :  All available lab work was reviewed. Patient was advised to repeat lab work before next visit      Counseled extensively and medication compliance urged. We discussed that for the  prevention of ASCVD our  goal is aggressive risk modification. Patient is encouraged to exercise even a brisk walk for 30 minutes  at least 3 to 4 times a week   Various goals were discussed and questions answered. Continue current medications. Appropriate prescriptions are addressed and refills ordered. Questions answered and patient verbalizes understanding. Call for any problems, questions, or concerns. Continue all other medications of all above medical condition listed as is. Return in about 6 months (around 11/6/2019).     Please note this report has been partially produced using speech recognition software and may contain errors related to that system including errors in grammar, punctuation, and spelling, as well as words and phrases that may be inappropriate.  If there are any questions or concerns please feel free to contact the dictating provider for clarification.

## 2019-05-08 DIAGNOSIS — F03.90 DEMENTIA WITHOUT BEHAVIORAL DISTURBANCE, UNSPECIFIED DEMENTIA TYPE: ICD-10-CM

## 2019-05-08 RX ORDER — DONEPEZIL HYDROCHLORIDE 10 MG/1
10 TABLET, FILM COATED ORAL NIGHTLY
Qty: 90 TABLET | Refills: 1 | OUTPATIENT
Start: 2019-05-08

## 2019-05-08 NOTE — PROGRESS NOTES
Results for the most recent sleep study on Jackie Albright  1940 are finalized and available. Please see media tab.     Electronically signed by Jaylen Boone on 5/7/2019 at 11:38 PM

## 2019-05-16 DIAGNOSIS — F03.90 DEMENTIA WITHOUT BEHAVIORAL DISTURBANCE, UNSPECIFIED DEMENTIA TYPE: ICD-10-CM

## 2019-05-16 RX ORDER — DONEPEZIL HYDROCHLORIDE 10 MG/1
10 TABLET, FILM COATED ORAL NIGHTLY
Qty: 90 TABLET | Refills: 1 | OUTPATIENT
Start: 2019-05-16

## 2019-05-21 ENCOUNTER — PROCEDURE VISIT (OUTPATIENT)
Dept: CARDIOLOGY CLINIC | Age: 79
End: 2019-05-21
Payer: MEDICARE

## 2019-05-21 DIAGNOSIS — R94.31 ABNORMAL EKG: ICD-10-CM

## 2019-05-21 DIAGNOSIS — I25.110 CORONARY ARTERY DISEASE INVOLVING NATIVE CORONARY ARTERY OF NATIVE HEART WITH UNSTABLE ANGINA PECTORIS (HCC): ICD-10-CM

## 2019-05-21 DIAGNOSIS — R10.84 GENERALIZED ABDOMINAL PAIN: ICD-10-CM

## 2019-05-21 DIAGNOSIS — R06.02 SOB (SHORTNESS OF BREATH) ON EXERTION: ICD-10-CM

## 2019-05-21 PROCEDURE — 93015 CV STRESS TEST SUPVJ I&R: CPT | Performed by: INTERNAL MEDICINE

## 2019-05-28 ENCOUNTER — HOSPITAL ENCOUNTER (OUTPATIENT)
Dept: SLEEP CENTER | Age: 79
Discharge: HOME | End: 2019-05-28
Payer: MEDICARE

## 2019-05-28 PROCEDURE — 95811 POLYSOM 6/>YRS CPAP 4/> PARM: CPT

## 2019-05-29 ENCOUNTER — HOSPITAL ENCOUNTER (OUTPATIENT)
Dept: CARDIAC REHAB | Age: 79
Setting detail: THERAPIES SERIES
Discharge: HOME | End: 2019-05-29
Payer: MEDICARE

## 2019-05-29 PROCEDURE — 93798 PHYS/QHP OP CAR RHAB W/ECG: CPT

## 2019-06-03 ENCOUNTER — HOSPITAL ENCOUNTER (OUTPATIENT)
Dept: CARDIAC REHAB | Age: 79
Setting detail: THERAPIES SERIES
Discharge: HOME | End: 2019-06-03
Payer: MEDICARE

## 2019-06-03 LAB
GLUCOSE BLD-MCNC: 198 MG/DL (ref 70–99)
GLUCOSE BLD-MCNC: 208 MG/DL (ref 70–99)

## 2019-06-03 PROCEDURE — 93798 PHYS/QHP OP CAR RHAB W/ECG: CPT

## 2019-06-03 PROCEDURE — 82962 GLUCOSE BLOOD TEST: CPT

## 2019-06-04 ENCOUNTER — APPOINTMENT (OUTPATIENT)
Dept: CARDIAC REHAB | Age: 79
End: 2019-06-04
Payer: MEDICARE

## 2019-06-06 ENCOUNTER — HOSPITAL ENCOUNTER (OUTPATIENT)
Dept: CARDIAC REHAB | Age: 79
Setting detail: THERAPIES SERIES
Discharge: HOME | End: 2019-06-06
Payer: MEDICARE

## 2019-06-06 ENCOUNTER — OFFICE VISIT (OUTPATIENT)
Dept: FAMILY MEDICINE CLINIC | Age: 79
End: 2019-06-06
Payer: MEDICARE

## 2019-06-06 VITALS
SYSTOLIC BLOOD PRESSURE: 104 MMHG | TEMPERATURE: 97.2 F | BODY MASS INDEX: 26.79 KG/M2 | HEART RATE: 105 BPM | DIASTOLIC BLOOD PRESSURE: 64 MMHG | OXYGEN SATURATION: 98 % | WEIGHT: 197.5 LBS

## 2019-06-06 DIAGNOSIS — I25.10 CORONARY ARTERY DISEASE INVOLVING NATIVE CORONARY ARTERY OF NATIVE HEART WITHOUT ANGINA PECTORIS: ICD-10-CM

## 2019-06-06 DIAGNOSIS — R42 LIGHTHEADEDNESS: ICD-10-CM

## 2019-06-06 DIAGNOSIS — I95.0 IDIOPATHIC HYPOTENSION: ICD-10-CM

## 2019-06-06 DIAGNOSIS — Z09 HOSPITAL DISCHARGE FOLLOW-UP: ICD-10-CM

## 2019-06-06 DIAGNOSIS — D64.9 NORMOCHROMIC ANEMIA: Primary | ICD-10-CM

## 2019-06-06 LAB
ALBUMIN SERPL-MCNC: 4.6 G/DL
ALP BLD-CCNC: 70 U/L
ALT SERPL-CCNC: 11 U/L
ANION GAP SERPL CALCULATED.3IONS-SCNC: 1.5 MMOL/L
AST SERPL-CCNC: 20 U/L
BASOPHILS ABSOLUTE: 0.1 /ΜL
BASOPHILS RELATIVE PERCENT: 1 %
BILIRUB SERPL-MCNC: 0.5 MG/DL (ref 0.1–1.4)
BUN BLDV-MCNC: 26 MG/DL
CALCIUM SERPL-MCNC: 10 MG/DL
CHLORIDE BLD-SCNC: 102 MMOL/L
CO2: 21 MMOL/L
CREAT SERPL-MCNC: 1.3 MG/DL
EOSINOPHILS ABSOLUTE: 0.2 /ΜL
EOSINOPHILS RELATIVE PERCENT: 2.3 %
GFR CALCULATED: 53
GLUCOSE BLD-MCNC: 124 MG/DL (ref 70–99)
GLUCOSE BLD-MCNC: 128 MG/DL (ref 70–99)
GLUCOSE BLD-MCNC: 172 MG/DL
HCT VFR BLD CALC: 43 % (ref 41–53)
HEMOGLOBIN: 13.9 G/DL (ref 13.5–17.5)
LYMPHOCYTES ABSOLUTE: 2.9 /ΜL
LYMPHOCYTES RELATIVE PERCENT: 30.3 %
MCH RBC QN AUTO: 30.1 PG
MCHC RBC AUTO-ENTMCNC: 32.3 G/DL
MCV RBC AUTO: 93.1 FL
MONOCYTES ABSOLUTE: 0.6 /ΜL
MONOCYTES RELATIVE PERCENT: 6 %
NEUTROPHILS ABSOLUTE: 5.7 /ΜL
NEUTROPHILS RELATIVE PERCENT: 60.4 %
PLATELET # BLD: 245 K/ΜL
PMV BLD AUTO: NORMAL FL
POTASSIUM SERPL-SCNC: 4.4 MMOL/L
RBC # BLD: 4.61 10^6/ΜL
SODIUM BLD-SCNC: 138 MMOL/L
TOTAL PROTEIN: 7.7
WBC # BLD: 9.5 10^3/ML

## 2019-06-06 PROCEDURE — 1036F TOBACCO NON-USER: CPT | Performed by: FAMILY MEDICINE

## 2019-06-06 PROCEDURE — G8417 CALC BMI ABV UP PARAM F/U: HCPCS | Performed by: FAMILY MEDICINE

## 2019-06-06 PROCEDURE — 1123F ACP DISCUSS/DSCN MKR DOCD: CPT | Performed by: FAMILY MEDICINE

## 2019-06-06 PROCEDURE — 1111F DSCHRG MED/CURRENT MED MERGE: CPT | Performed by: FAMILY MEDICINE

## 2019-06-06 PROCEDURE — G8428 CUR MEDS NOT DOCUMENT: HCPCS | Performed by: FAMILY MEDICINE

## 2019-06-06 PROCEDURE — 82962 GLUCOSE BLOOD TEST: CPT

## 2019-06-06 PROCEDURE — 99214 OFFICE O/P EST MOD 30 MIN: CPT | Performed by: FAMILY MEDICINE

## 2019-06-06 PROCEDURE — G8598 ASA/ANTIPLAT THER USED: HCPCS | Performed by: FAMILY MEDICINE

## 2019-06-06 PROCEDURE — 93798 PHYS/QHP OP CAR RHAB W/ECG: CPT

## 2019-06-06 PROCEDURE — 4040F PNEUMOC VAC/ADMIN/RCVD: CPT | Performed by: FAMILY MEDICINE

## 2019-06-06 RX ORDER — ISOSORBIDE MONONITRATE 30 MG/1
30 TABLET, EXTENDED RELEASE ORAL DAILY
Qty: 90 TABLET | Refills: 1 | Status: CANCELLED | OUTPATIENT
Start: 2019-06-06

## 2019-06-06 RX ORDER — CLOPIDOGREL BISULFATE 75 MG/1
75 TABLET ORAL DAILY
Qty: 90 TABLET | Refills: 1 | Status: CANCELLED | OUTPATIENT
Start: 2019-06-06

## 2019-06-06 ASSESSMENT — PATIENT HEALTH QUESTIONNAIRE - PHQ9
SUM OF ALL RESPONSES TO PHQ QUESTIONS 1-9: 2
2. FEELING DOWN, DEPRESSED OR HOPELESS: 1
1. LITTLE INTEREST OR PLEASURE IN DOING THINGS: 1
SUM OF ALL RESPONSES TO PHQ9 QUESTIONS 1 & 2: 2
SUM OF ALL RESPONSES TO PHQ QUESTIONS 1-9: 2

## 2019-06-06 NOTE — PROGRESS NOTES
Post-Discharge Transitional Care Management Services or Hospital Follow Up      Mary Donahue   YOB: 1940    Date of Office Visit:  6/6/2019  Date of Hospital Admission: 4/23/19  Date of Hospital Discharge: 4/25/19  Risk of hospital readmission (high >=14%. Medium >=10%) :Readmission Risk Score: 19      Care management risk score Rising risk (score 2-5) and Complex Care (Scores >=6): 2     Non face to face  following discharge, date last encounter closed (first attempt may have been earlier): *No documented post hospital discharge outreach found in the last 14 days    Call initiated 2 business days of discharge: *No response recorded in the last 14 days    Patient Active Problem List   Diagnosis    Diabetes mellitus ([facility] [address]    MVP (mitral valve prolapse)    Fatigue    Chest pain    Ataxia    H/O pneumothorax    Hyperlipidemia associated with type 2 diabetes mellitus ([facility] [address]    Acquired hypothyroidism    ASCVD (arteriosclerotic cardiovascular disease)    Overweight (BMI 25.0-29. 9)    JOSE CRUZ (obstructive sleep apnea)    Excessive daytime sleepiness    SOB (shortness of breath) on exertion    Interstitial lung disease (HCC)    Endobronchial mass    Lung cancer, main bronchus, left (HCC)    Pneumonia    Constipation    Failure to thrive (0-17)    Pleural effusion    Angina at rest [facility])    Coronary artery disease involving native coronary artery of native heart with unstable angina pectoris (HCC)       No Known Allergies    Medications listed as ordered at the time of discharge from hospital   Rosa Isela SALGADO   [place] Medication Instructions JOSE M:    Printed on:06/06/19 6864   Medication Information                      aspirin 81 MG EC tablet  Take 1 tablet by mouth every morning Over The Counter             atorvastatin (LIPITOR) 20 MG tablet  Take 1 tablet by mouth daily             Cholecalciferol (VITAMIN D3) 5000 units TABS  Take by mouth every morning Over The Counter clopidogrel (PLAVIX) 75 MG tablet  Take 1 tablet by mouth daily             [employer]S GAS RELIEF 80 MG chewable tablet  Take 1 tablet by mouth as needed             Cyanocobalamin (VITAMIN B-12 PO)  Take 2,500 mcg by mouth every morning Over The Counter             docusate sodium (COLACE, DULCOLAX) 100 MG CAPS  Take 100 mg by mouth 2 times daily             donepezil (ARICEPT) 10 MG tablet  Take 1 tablet by mouth nightly             ipratropium-albuterol (DUONEB) 0.5-2.5 (3) MG/3ML SOLN nebulizer solution  Inhale 1 vial into the lungs 4 times daily             ipratropium-albuterol (DUONEB) 0.5-2.5 (3) MG/3ML SOLN nebulizer solution  Inhale 3 mLs into the lungs every 4 hours DX: Mod Asthma J45.42             isosorbide mononitrate (IMDUR) 30 MG extended release tablet  Take 1 tablet by mouth daily             lactobacillus (CULTURELLE) capsule  Take 1 capsule by mouth daily (with breakfast)             levothyroxine (SYNTHROID) 50 MCG tablet  Take 1 tablet by mouth Daily             lidocaine (LMX) 4 % cream  Apply topically as needed.              metoprolol tartrate (LOPRESSOR) 25 MG tablet  Take 0.5 tablets by mouth 2 times daily             Naproxen Sod-Diphenhydramine (ALEVE PM PO)  Take by mouth as needed Over The Counter             pioglitazone (ACTOS) 45 MG tablet  Take 1 tablet by mouth daily             polyethylene glycol (GLYCOLAX) powder  Take 17 g by mouth 2 times daily             predniSONE (DELTASONE) 20 MG tablet  Prednisone taper - 40mg QD for 4 days, 20mg QD for 4 days, 20 mg every other day for 4 days             sertraline (ZOLOFT) 50 MG tablet  Take 1 tablet by mouth daily             SITagliptin (JANUVIA) 100 MG tablet  Take 1 tablet by mouth daily                   Medications marked \"taking\" at this time  No outpatient medications have been marked as taking for the 6/6/19 encounter (Office Visit) with Thomas Patel MD.        Medications patient taking as of now reconciled against medications ordered at time of hospital discharge: Yes    Chief Complaint   Patient presents with    Follow-Up from Hospital     vertigo        History of Present illness - Follow up of Hospital diagnosis(es): Chest pain, coronary disease status post LAD stent, fatigue, interstitial lung disease, diabetes type 2, hypothyroidism, depression    Inpatient course: Discharge summary reviewed- see chart. Interval history/Current status: Denies any chest pain or shortness of breath, but does have lightheadedness and hypotension whenever he stands up. He is currently undergoing cardiac rehab but has had to stop it due to lightheadedness. Apparently he did not stop his Pamelor that was discontinued in March due to lightheadedness. Last blood draw in late April showed a hemoglobin of 10.4    A comprehensive review of systems was negative except for what was noted in the HPI. Vitals:    06/06/19 1559   BP: 104/64   Site: Left Upper Arm   Position: Sitting   Cuff Size: Medium Adult   Pulse: 105   Temp: 97.2 °F (36.2 °C)   TempSrc: Temporal   SpO2: 98%   Weight: 197 lb 8 oz (89.6 kg)     Body mass index is 26.79 kg/m².    Wt Readings from Last 3 Encounters:   06/06/19 197 lb 8 oz (89.6 kg)   05/08/19 198 lb (89.8 kg)   05/06/19 197 lb 6.4 oz (89.5 kg)     BP Readings from Last 3 Encounters:   06/06/19 104/64   05/06/19 100/70   04/25/19 135/69        Physical Exam:  General Appearance: alert and oriented to person, place and time, well developed and well- nourished, in no acute distress  Skin: warm and dry, no rash or erythema  Head: normocephalic and atraumatic  Eyes: pupils equal, round, and reactive to light, extraocular eye movements intact, conjunctivae normal  ENT: tympanic membrane, external ear and ear canal normal bilaterally, nose without deformity, nasal mucosa and turbinates normal without polyps  Neck: supple and non-tender without mass, no thyromegaly or thyroid nodules, no cervical lymphadenopathy  Pulmonary/Chest: clear to auscultation bilaterally- no wheezes, rales or rhonchi, normal air movement, no respiratory distress  Cardiovascular: normal rate, regular rhythm, normal S1 and S2, no murmurs, rubs, clicks, or gallops, distal pulses intact, no carotid bruits  Abdomen: soft, non-tender, non-distended, normal bowel sounds, no masses or organomegaly  Extremities: no cyanosis, clubbing or edema  Musculoskeletal: normal range of motion, no joint swelling, deformity or tenderness  Neurologic: reflexes normal and symmetric, no cranial nerve deficit, gait, coordination and speech normal    Assessment/Plan:  1. Normochromic anemia  Could be cause of lightheadedness and dizziness.  - CBC    2. Idiopathic hypotension  Possibly due to hypovolemia  - Comprehensive Metabolic Panel    3. Lightheadedness, possibly due to the above diagnoses versus continuation of Pamelor. Stop Pamelor, discussed with daughter. 4.  Depression  We'll continue sertraline 50 mg for now. We'll add increased dose if needed      Medical Decision Making: high complexity    Follow-up one week.

## 2019-06-06 NOTE — PROGRESS NOTES
Results for the most recent sleep study on Ben Oviedo  1940 are finalized and available. Please see media tab.     Electronically signed by Justin Rand on 6/6/2019 at 12:25 PM

## 2019-06-06 NOTE — PATIENT INSTRUCTIONS
Patient Education        Anemia From Heavy Bleeding: Care Instructions  Your Care Instructions    Anemia means that your body does not have enough red blood cells. Red blood cells carry oxygen around the body. When you have anemia, you may feel dizzy, tired, and weak. You may also feel your heart pounding. For some people, it's hard to focus and think clearly. One common cause of anemia is bleeding. Bleeding from ulcers, hemorrhoids, cancer, or other problems can cause anemia. It may also be caused by heavy menstrual periods. Your treatment may include iron pills. Iron helps your body make hemoglobin. Hemoglobin is the part of the red blood cell that carries oxygen. If you have severe anemia, you may need a blood transfusion to give you red blood cells as quickly as possible. Sometimes it takes several months to get iron levels back to normal.  Follow-up care is a key part of your treatment and safety. Be sure to make and go to all appointments, and call your doctor if you are having problems. It's also a good idea to know your test results and keep a list of the medicines you take. How can you care for yourself at home? · Be safe with medicines. Take your medicines exactly as prescribed. Call your doctor if you think you are having a problem with your medicine. · Follow your doctor's advice about eating foods that have a lot of iron in them. These include red meat, shellfish, poultry, and eggs. They also include beans, raisins, whole-grain bread, and leafy green vegetables. · Steam your vegetables. This is the best way to prepare them if you want to get as much iron as possible. · Iron pills can cause constipation. If you take them, there are things you can do to avoid constipation. Drink plenty of fluids, eat foods with a lot of fiber, and exercise every day. When should you call for help? Call 911 anytime you think you may need emergency care.  For example, call if:    · You passed out (lost consciousness).     · Your stools are maroon or very bloody.    Call your doctor now or seek immediate medical care if:    · You are short of breath.     · You have new or worse bleeding.     · You are dizzy or light-headed, or you feel like you may faint.    Watch closely for changes in your health, and be sure to contact your doctor if:    · You feel weaker or more tired than usual.     · You do not get better as expected. Where can you learn more? Go to [email or web address] and sign in to your [employer] [address] account. Enter W200 in the Coll[employer] box to learn more about \"Anemia From Heavy Bleeding: Care Instructions. \"     If you do not have an account, please click on the \"Sign Up Now\" link. Current as of: May 6, 2018  Content Version: 12.0  © 4831-8606 [employer]wise, Incorporated. Care instructions adapted under license by [facility] ([facility]). If you have questions about a medical condition or this instruction, always ask your healthcare professional. [address] any warranty or liability for your use of this information.

## 2019-06-07 ENCOUNTER — TELEPHONE (OUTPATIENT)
Dept: FAMILY MEDICINE CLINIC | Age: 79
End: 2019-06-07

## 2019-06-07 LAB
A/G RATIO: 1.5 (CALC) (ref 0.8–2.6)
ALBUMIN SERPL-MCNC: 4.6 GM/DL (ref 3.5–5.2)
ALP BLD-CCNC: 70 U/L (ref 23–144)
ALT SERPL-CCNC: 11 U/L (ref 0–60)
AST SERPL-CCNC: 20 U/L (ref 0–55)
BASOPHILS ABSOLUTE: 0.1 K/MM3 (ref 0–0.3)
BASOPHILS RELATIVE PERCENT: 1 % (ref 0–2)
BILIRUB SERPL-MCNC: 0.5 MG/DL (ref 0–1.2)
BUN / CREAT RATIO: 20 (CALC) (ref 7–25)
BUN BLDV-MCNC: 26 MG/DL (ref 3–29)
CALCIUM SERPL-MCNC: 10 MG/DL (ref 8.5–10.5)
CHLORIDE BLD-SCNC: 102 MEQ/L (ref 96–110)
CO2: 21 MEQ/L (ref 19–32)
CREAT SERPL-MCNC: 1.3 MG/DL
EOSINOPHILS ABSOLUTE: 0.2 K/MM3 (ref 0–0.6)
EOSINOPHILS RELATIVE PERCENT: 2.3 % (ref 0–7)
GFR SERPL CREATININE-BSD FRML MDRD: 53 ML/MIN/1.73M2
GLOBULIN: 3.1 GM/DL (CALC) (ref 1.9–3.6)
GLUCOSE BLD-MCNC: 172 MG/DL
HCT VFR BLD CALC: 43 % (ref 41–50)
HEMOGLOBIN: 13.9 G/DL (ref 13.8–17.2)
LEUKOCYTES, UA: 9.5 K/MM3 (ref 3.8–10.8)
LYMPHOCYTES ABSOLUTE: 2.9 K/MM3 (ref 0.9–4.1)
LYMPHOCYTES RELATIVE PERCENT: 30.3 % (ref 14–51)
MCH RBC QN AUTO: 30.1 PG (ref 27–33)
MCHC RBC AUTO-ENTMCNC: 32.3 G/DL (ref 32–36)
MCV RBC AUTO: 93.1 FL (ref 80–100)
MONOCYTES ABSOLUTE: 0.6 K/MM3 (ref 0.2–1.1)
MONOCYTES RELATIVE PERCENT: 6 % (ref 0–14)
NEUTROPHILS ABSOLUTE: 5.7 K/MM3 (ref 1.5–7.8)
PDW BLD-RTO: 16.5 % (ref 9–15)
PLATELET # BLD: 245 K/MM3 (ref 130–400)
POTASSIUM SERPL-SCNC: 4.4 MEQ/L (ref 3.4–5.3)
RBC # BLD: 4.61 M/MM3 (ref 4.4–5.8)
SEGMENTED NEUTROPHILS RELATIVE PERCENT: 60.4 % (ref 40–76)
SODIUM BLD-SCNC: 138 MEQ/L (ref 135–148)
TOTAL PROTEIN: 7.7 GM/DL (ref 6–8.3)

## 2019-06-07 NOTE — TELEPHONE ENCOUNTER
Pt daughter called asking for labs. Dr. Lisa Mary reviewed and states that everything looks WNL besides slightly low kidney function. Daughter was notified of these results and states you mentioned starting him on a medication to help with the dizziness. Is this something I can call in for him?  Please advise

## 2019-06-08 RX ORDER — FLUDROCORTISONE ACETATE 0.1 MG/1
0.1 TABLET ORAL DAILY
Qty: 30 TABLET | Refills: 1 | Status: SHIPPED | OUTPATIENT
Start: 2019-06-08 | End: 2019-07-02 | Stop reason: SDUPTHER

## 2019-06-10 ENCOUNTER — HOSPITAL ENCOUNTER (OUTPATIENT)
Dept: CARDIAC REHAB | Age: 79
Setting detail: THERAPIES SERIES
Discharge: HOME | End: 2019-06-10
Payer: MEDICARE

## 2019-06-10 LAB
GLUCOSE BLD-MCNC: 132 MG/DL (ref 70–99)
GLUCOSE BLD-MCNC: 141 MG/DL (ref 70–99)

## 2019-06-10 PROCEDURE — 82962 GLUCOSE BLOOD TEST: CPT

## 2019-06-10 PROCEDURE — 93798 PHYS/QHP OP CAR RHAB W/ECG: CPT

## 2019-06-11 ENCOUNTER — HOSPITAL ENCOUNTER (OUTPATIENT)
Dept: CARDIAC REHAB | Age: 79
Setting detail: THERAPIES SERIES
Discharge: HOME | End: 2019-06-11
Payer: MEDICARE

## 2019-06-11 LAB
GLUCOSE BLD-MCNC: 142 MG/DL (ref 70–99)
GLUCOSE BLD-MCNC: 178 MG/DL (ref 70–99)

## 2019-06-11 PROCEDURE — 82962 GLUCOSE BLOOD TEST: CPT

## 2019-06-11 PROCEDURE — 93798 PHYS/QHP OP CAR RHAB W/ECG: CPT

## 2019-06-13 ENCOUNTER — HOSPITAL ENCOUNTER (OUTPATIENT)
Dept: CARDIAC REHAB | Age: 79
Setting detail: THERAPIES SERIES
Discharge: HOME | End: 2019-06-13
Payer: MEDICARE

## 2019-06-13 LAB
GLUCOSE BLD-MCNC: 137 MG/DL (ref 70–99)
GLUCOSE BLD-MCNC: 141 MG/DL (ref 70–99)

## 2019-06-13 PROCEDURE — 82962 GLUCOSE BLOOD TEST: CPT

## 2019-06-13 PROCEDURE — 93798 PHYS/QHP OP CAR RHAB W/ECG: CPT

## 2019-06-17 ENCOUNTER — HOSPITAL ENCOUNTER (OUTPATIENT)
Dept: CARDIAC REHAB | Age: 79
Setting detail: THERAPIES SERIES
Discharge: HOME | End: 2019-06-17
Payer: MEDICARE

## 2019-06-17 PROCEDURE — 93798 PHYS/QHP OP CAR RHAB W/ECG: CPT

## 2019-06-18 ENCOUNTER — HOSPITAL ENCOUNTER (OUTPATIENT)
Dept: CARDIAC REHAB | Age: 79
Setting detail: THERAPIES SERIES
Discharge: HOME | End: 2019-06-18
Payer: MEDICARE

## 2019-06-18 LAB
GLUCOSE BLD-MCNC: 118 MG/DL (ref 70–99)
GLUCOSE BLD-MCNC: 169 MG/DL (ref 70–99)

## 2019-06-18 PROCEDURE — 93798 PHYS/QHP OP CAR RHAB W/ECG: CPT

## 2019-06-18 PROCEDURE — 82962 GLUCOSE BLOOD TEST: CPT

## 2019-06-20 ENCOUNTER — HOSPITAL ENCOUNTER (OUTPATIENT)
Dept: CARDIAC REHAB | Age: 79
Setting detail: THERAPIES SERIES
Discharge: HOME | End: 2019-06-20
Payer: MEDICARE

## 2019-06-20 PROCEDURE — 93798 PHYS/QHP OP CAR RHAB W/ECG: CPT

## 2019-06-24 ENCOUNTER — HOSPITAL ENCOUNTER (OUTPATIENT)
Dept: CARDIAC REHAB | Age: 79
Setting detail: THERAPIES SERIES
Discharge: HOME | End: 2019-06-24
Payer: MEDICARE

## 2019-06-24 PROCEDURE — 93798 PHYS/QHP OP CAR RHAB W/ECG: CPT

## 2019-06-25 ENCOUNTER — HOSPITAL ENCOUNTER (OUTPATIENT)
Dept: CARDIAC REHAB | Age: 79
Setting detail: THERAPIES SERIES
Discharge: HOME | End: 2019-06-25
Payer: MEDICARE

## 2019-06-25 PROCEDURE — 93798 PHYS/QHP OP CAR RHAB W/ECG: CPT

## 2019-06-27 ENCOUNTER — HOSPITAL ENCOUNTER (OUTPATIENT)
Dept: CARDIAC REHAB | Age: 79
Setting detail: THERAPIES SERIES
Discharge: HOME | End: 2019-06-27
Payer: MEDICARE

## 2019-06-27 PROCEDURE — 93798 PHYS/QHP OP CAR RHAB W/ECG: CPT

## 2019-07-01 ENCOUNTER — HOSPITAL ENCOUNTER (OUTPATIENT)
Dept: CARDIAC REHAB | Age: 79
Setting detail: THERAPIES SERIES
Discharge: HOME | End: 2019-07-01
Payer: MEDICARE

## 2019-07-01 PROCEDURE — 93798 PHYS/QHP OP CAR RHAB W/ECG: CPT

## 2019-07-02 ENCOUNTER — HOSPITAL ENCOUNTER (EMERGENCY)
Age: 79
Discharge: HOME | End: 2019-07-02
Attending: EMERGENCY MEDICINE
Payer: MEDICARE

## 2019-07-02 ENCOUNTER — APPOINTMENT (OUTPATIENT)
Dept: GENERAL RADIOLOGY | Age: 79
End: 2019-07-02
Payer: MEDICARE

## 2019-07-02 ENCOUNTER — APPOINTMENT (OUTPATIENT)
Dept: CT IMAGING | Age: 79
End: 2019-07-02
Payer: MEDICARE

## 2019-07-02 ENCOUNTER — HOSPITAL ENCOUNTER (OUTPATIENT)
Dept: CARDIAC REHAB | Age: 79
Setting detail: THERAPIES SERIES
Discharge: HOME | End: 2019-07-02
Payer: MEDICARE

## 2019-07-02 VITALS
SYSTOLIC BLOOD PRESSURE: 148 MMHG | RESPIRATION RATE: 16 BRPM | WEIGHT: 197 LBS | HEART RATE: 75 BPM | HEIGHT: 72 IN | OXYGEN SATURATION: 95 % | BODY MASS INDEX: 26.68 KG/M2 | DIASTOLIC BLOOD PRESSURE: 73 MMHG

## 2019-07-02 DIAGNOSIS — J90 PLEURAL EFFUSION: ICD-10-CM

## 2019-07-02 DIAGNOSIS — R06.02 SHORTNESS OF BREATH: ICD-10-CM

## 2019-07-02 DIAGNOSIS — R53.83 FATIGUE, UNSPECIFIED TYPE: Primary | ICD-10-CM

## 2019-07-02 LAB
ALBUMIN SERPL-MCNC: 3.5 GM/DL (ref 3.4–5)
ALP BLD-CCNC: 53 IU/L (ref 40–129)
ALT SERPL-CCNC: 8 U/L (ref 10–40)
ANION GAP SERPL CALCULATED.3IONS-SCNC: 9 MMOL/L (ref 4–16)
AST SERPL-CCNC: 14 IU/L (ref 15–37)
BASOPHILS ABSOLUTE: 0.1 K/CU MM
BASOPHILS RELATIVE PERCENT: 1.2 % (ref 0–1)
BILIRUB SERPL-MCNC: 0.3 MG/DL (ref 0–1)
BUN BLDV-MCNC: 18 MG/DL (ref 6–23)
CALCIUM SERPL-MCNC: 10 MG/DL (ref 8.3–10.6)
CHLORIDE BLD-SCNC: 107 MMOL/L (ref 99–110)
CO2: 28 MMOL/L (ref 21–32)
CREAT SERPL-MCNC: 1.2 MG/DL (ref 0.9–1.3)
DIFFERENTIAL TYPE: ABNORMAL
EOSINOPHILS ABSOLUTE: 0.3 K/CU MM
EOSINOPHILS RELATIVE PERCENT: 3 % (ref 0–3)
GFR AFRICAN AMERICAN: >60 ML/MIN/1.73M2
GFR NON-AFRICAN AMERICAN: 59 ML/MIN/1.73M2
GLUCOSE BLD-MCNC: 107 MG/DL (ref 70–99)
HCT VFR BLD CALC: 40.3 % (ref 42–52)
HEMOGLOBIN: 12.4 GM/DL (ref 13.5–18)
IMMATURE NEUTROPHIL %: 0.5 % (ref 0–0.43)
LYMPHOCYTES ABSOLUTE: 2.8 K/CU MM
LYMPHOCYTES RELATIVE PERCENT: 33.8 % (ref 24–44)
MCH RBC QN AUTO: 29.9 PG (ref 27–31)
MCHC RBC AUTO-ENTMCNC: 30.8 % (ref 32–36)
MCV RBC AUTO: 97.1 FL (ref 78–100)
MONOCYTES ABSOLUTE: 0.7 K/CU MM
MONOCYTES RELATIVE PERCENT: 8.2 % (ref 0–4)
NUCLEATED RBC %: 0 %
PDW BLD-RTO: 15.2 % (ref 11.7–14.9)
PLATELET # BLD: 202 K/CU MM (ref 140–440)
PMV BLD AUTO: 10.3 FL (ref 7.5–11.1)
POTASSIUM SERPL-SCNC: 5 MMOL/L (ref 3.5–5.1)
PRO-BNP: 404.4 PG/ML
RBC # BLD: 4.15 M/CU MM (ref 4.6–6.2)
SEGMENTED NEUTROPHILS ABSOLUTE COUNT: 4.5 K/CU MM
SEGMENTED NEUTROPHILS RELATIVE PERCENT: 53.3 % (ref 36–66)
SODIUM BLD-SCNC: 144 MMOL/L (ref 135–145)
TOTAL IMMATURE NEUTOROPHIL: 0.04 K/CU MM
TOTAL NUCLEATED RBC: 0 K/CU MM
TOTAL PROTEIN: 6.9 GM/DL (ref 6.4–8.2)
TROPONIN T: <0.01 NG/ML
WBC # BLD: 8.4 K/CU MM (ref 4–10.5)

## 2019-07-02 PROCEDURE — 83880 ASSAY OF NATRIURETIC PEPTIDE: CPT

## 2019-07-02 PROCEDURE — 36415 COLL VENOUS BLD VENIPUNCTURE: CPT

## 2019-07-02 PROCEDURE — 93798 PHYS/QHP OP CAR RHAB W/ECG: CPT

## 2019-07-02 PROCEDURE — 6360000004 HC RX CONTRAST MEDICATION: Performed by: EMERGENCY MEDICINE

## 2019-07-02 PROCEDURE — 71046 X-RAY EXAM CHEST 2 VIEWS: CPT

## 2019-07-02 PROCEDURE — 71275 CT ANGIOGRAPHY CHEST: CPT

## 2019-07-02 PROCEDURE — 85025 COMPLETE CBC W/AUTO DIFF WBC: CPT

## 2019-07-02 PROCEDURE — 80053 COMPREHEN METABOLIC PANEL: CPT

## 2019-07-02 PROCEDURE — 93005 ELECTROCARDIOGRAM TRACING: CPT | Performed by: EMERGENCY MEDICINE

## 2019-07-02 PROCEDURE — 84484 ASSAY OF TROPONIN QUANT: CPT

## 2019-07-02 PROCEDURE — 99285 EMERGENCY DEPT VISIT HI MDM: CPT

## 2019-07-02 PROCEDURE — 93010 ELECTROCARDIOGRAM REPORT: CPT | Performed by: INTERNAL MEDICINE

## 2019-07-02 RX ADMIN — IOPAMIDOL 80 ML: 755 INJECTION, SOLUTION INTRAVENOUS at 15:17

## 2019-07-02 SDOH — HEALTH STABILITY: MENTAL HEALTH: HOW OFTEN DO YOU HAVE A DRINK CONTAINING ALCOHOL?: NEVER

## 2019-07-02 NOTE — PROGRESS NOTES
Inhale 3 mLs into the lungs every 4 hours DX: Mod Asthma J45.42 5/8/19 7/2/19  Greg Beck MD   metoprolol tartrate (LOPRESSOR) 25 MG tablet Take 0.5 tablets by mouth 2 times daily 4/25/19   Luz Maria Katz MD   predniSONE (DELTASONE) 20 MG tablet Prednisone taper - 40mg QD for 4 days, 20mg QD for 4 days, 20 mg every other day for 4 days 4/25/19   Luz Maria Katz MD   Cyanocobalamin (VITAMIN B-12 PO) Take 2,500 mcg by mouth every morning Over The Counter    Historical Provider, MD   Naproxen Sod-Diphenhydramine (ALEVE PM PO) Take by mouth as needed Over The Counter    Historical Provider, MD     Medications removed from list (include reason, ex. noncompliance, medication cost, therapy complete etc.):   Gas relief patient no longer takes this  Lactobacillus patient no longer takes this  Polyethylene patient no longer takes this  Lidocaine cream no longer using    Other Comments:  Medication list reviewed with family and insurance claims verified. Family reports patient has taken first doses of medications today. Recent claims for nortriptyline per PCP this is discontinued.     To my knowledge the above medication history is accurate as of 7/2/2019 4:21 PM.   Amber Sanchez CPhT   7/2/2019 4:21 PM

## 2019-07-02 NOTE — ED PROVIDER NOTES
stress test 11/07/2018    EF 61%, Normal    [place] (hard of hearing)     Bilateral Hearing Aids    Hx of CT scan of chest 11/12/2018    3 mm indeterminate solid nonobstructing endobronchial nodule of the proximal left upper lobe bronchus. Further evaluation with endoscopy is recommended. Bilateral basilar predominant intersitial changes suggestive of nonspecific interstitial pneumonitis.     Hx of Doppler echocardiogram 11/20/2018    EF55-60%,no valvular disease    Hyperlipidemia     Hypertension     Left Lung Cancer Dx 11-18    LEFT UPPER LOBECTOMY 1/28/19    Lung nodule     \"they say I have a spot on my lung- and mass in left lung so thats why doing the bronch\"( 12/7/2018)    Memory loss     MVP (mitral valve prolapse)     follows with Dr Garner Killer (myocardial infarction)     \"had heart attack 30 yrs ago a mild one\"    Shortness of breath on exertion     Teeth missing     Upper And Lower    Thyroid disease     Wears dentures     Full Upper    Wears glasses     Wears hearing aid     Bilateral Ears     Past Surgical History:   Procedure Laterality Date    BRONCHOSCOPY N/A 12/7/2018    BRONCHOSCOPY/TRANSBRONCHIAL LUNG BIOPSY performed by Berlin Collins MD at [address]  [address] 1990's    CHOLECYSTECTOMY, LAPAROSCOPIC  2002    COLONOSCOPY  Last Done In 2006    Polyps Removed In Past    COLONOSCOPY N/A 4/9/2019    COLONOSCOPY DIAGNOSTIC performed by Yuval Harris MD at [address] [facility]      Teeth Extracted In Past    EYE SURGERY Bilateral 2000's    \"Laser For Glaucoma\"    KNEE ARTHROSCOPY Bilateral 1970's To 1990's    \"Numerous\"    KNEE SURGERY Left 1980's Or 1990's    LUNG REMOVAL, TOTAL Left 1/28/2019    THORACOTOMY LEFT UPPER LOBECTOMY ROBOTIC ASSISTED performed by Annette Dangelo MD at [address]  1980's     Family History   Problem Relation Age of Onset    Heart Disease Mother         Enlarged Heart    High Blood morning Over The Counter 90 tablet 1    isosorbide mononitrate (IMDUR) 30 MG extended release tablet Take 1 tablet by mouth daily 90 tablet 1    clopidogrel (PLAVIX) 75 MG tablet Take 1 tablet by mouth daily 90 tablet 1    donepezil (ARICEPT) 10 MG tablet Take 1 tablet by mouth nightly 90 tablet 1    atorvastatin (LIPITOR) 20 MG tablet Take 1 tablet by mouth daily 90 tablet 1    pioglitazone (ACTOS) 45 MG tablet Take 1 tablet by mouth daily 90 tablet 1    sertraline (ZOLOFT) 50 MG tablet Take 1 tablet by mouth daily 90 tablet 1    SITagliptin (JANUVIA) 100 MG tablet Take 1 tablet by mouth daily 90 tablet 1    levothyroxine (SYNTHROID) 50 MCG tablet Take 1 tablet by mouth Daily 90 tablet 1    docusate sodium (COLACE, DULCOLAX) 100 MG CAPS Take 100 mg by mouth 2 times daily 60 capsule 5    Cholecalciferol (VITAMIN D3) 5000 units TABS Take by mouth every morning Over The Counter      predniSONE (DELTASONE) 20 MG tablet Prednisone taper - 40mg QD for 4 days, 20mg QD for 4 days, 20 mg every other day for 4 days 14 tablet 0    Cyanocobalamin (VITAMIN B-12 PO) Take 2,500 mcg by mouth every morning Over The Counter      Naproxen Sod-Diphenhydramine (ALEVE PM PO) Take by mouth as needed Over The Counter       No Known Allergies    Nursing Notes Reviewed    Physical Exam:  Triage VS:    ED Triage Vitals [07/02/19 1253]   Enc Vitals Group      /82      Pulse 63      Resp 16      Temp       Temp src       SpO2 95 %      Weight 197 lb (89.4 kg)      Height 6' (1.829 m)      Head Circumference       Peak Flow       Pain Score       Pain Loc       Pain Edu? Excl. in [address]? My pulse ox interpretation is - normal    General appearance:  No acute distress. Skin:  Warm. Dry. Eye:  Extraocular movements intact. Ears, nose, mouth and throat:  Oral mucosa moist   Neck:  Trachea midline. Extremity:  No swelling. Normal ROM    Heart:  Regular rate and rhythm, normal S1 & S2, no extra heart sounds. <300 PG/ML   Troponin   Result Value Ref Range    Troponin T <0.010 <0.01 NG/ML   EKG 12 Lead   Result Value Ref Range    Ventricular Rate 58 BPM    Atrial Rate 58 BPM    P-R Interval 160 ms    QRS Duration 94 ms    Q-T Interval 456 ms    QTc Calculation (Bazett) 447 ms    P Axis 11 degrees    R Axis -10 degrees    T Axis 14 degrees    Diagnosis       Sinus bradycardia  Minimal voltage criteria for LVH, may be normal variant  Borderline ECG  When compared with ECG of 24-FEB-2019 11:48,  No significant change was found  Confirmed by Reynold Latham MD, Priyanka Morales ([id number]) on 7/2/2019 5:56:07 PM        Radiographs (if obtained):    [] Radiologist's Report Reviewed:  CTA PULMONARY W CONTRAST   Final Result   Postsurgical scratch changes from prior left upper lobectomy with no evidence   for pulmonary emboli      Left-sided pleural effusion with chronic fibrotic changes seen in both lungs. No acute cardiopulmonary process seen. XR CHEST STANDARD (2 VW)   Final Result   Stable chest demonstrating fibrotic changes throughout both lungs status post   left upper lobectomy               EKG (if obtained): (All EKG's are interpreted by myself in the absence of a cardiologist)  Sinus bradycardia with a rate of 77 beats per minutes, no ST elevation. Normal intervals. he had no previous to compare. Chart review shows recent radiographs:  No results found. MDM:  67-year-old male with history as above presents with concern for hypotensive episode and hypoxic episode at cardiac rehab, he is in no acute distress with completely normal vitals on arrival.  He has had a little bit of an off-and-on cough, has had issues for months it sounds like. Basic labs were ordered and his troponin is normal, chest x-ray is stable, he had an known previous small pleural effusion on the left. I did order a CT angios pulmonary given the recent admission, cath, stent, multiple issues, cancer.   This was negative other than the

## 2019-07-03 LAB
EKG ATRIAL RATE: 58 BPM
EKG DIAGNOSIS: NORMAL
EKG P AXIS: 11 DEGREES
EKG P-R INTERVAL: 160 MS
EKG Q-T INTERVAL: 456 MS
EKG QRS DURATION: 94 MS
EKG QTC CALCULATION (BAZETT): 447 MS
EKG R AXIS: -10 DEGREES
EKG T AXIS: 14 DEGREES
EKG VENTRICULAR RATE: 58 BPM

## 2019-07-05 ENCOUNTER — OFFICE VISIT (OUTPATIENT)
Dept: CARDIOLOGY CLINIC | Age: 79
End: 2019-07-05
Payer: MEDICARE

## 2019-07-05 VITALS
HEART RATE: 80 BPM | BODY MASS INDEX: 26.84 KG/M2 | WEIGHT: 198.2 LBS | DIASTOLIC BLOOD PRESSURE: 90 MMHG | SYSTOLIC BLOOD PRESSURE: 130 MMHG | HEIGHT: 72 IN

## 2019-07-05 DIAGNOSIS — I25.10 CORONARY ARTERY DISEASE INVOLVING NATIVE CORONARY ARTERY OF NATIVE HEART WITHOUT ANGINA PECTORIS: Primary | ICD-10-CM

## 2019-07-05 PROCEDURE — 1036F TOBACCO NON-USER: CPT | Performed by: INTERNAL MEDICINE

## 2019-07-05 PROCEDURE — G8598 ASA/ANTIPLAT THER USED: HCPCS | Performed by: INTERNAL MEDICINE

## 2019-07-05 PROCEDURE — 4040F PNEUMOC VAC/ADMIN/RCVD: CPT | Performed by: INTERNAL MEDICINE

## 2019-07-05 PROCEDURE — 1123F ACP DISCUSS/DSCN MKR DOCD: CPT | Performed by: INTERNAL MEDICINE

## 2019-07-05 PROCEDURE — G8417 CALC BMI ABV UP PARAM F/U: HCPCS | Performed by: INTERNAL MEDICINE

## 2019-07-05 PROCEDURE — G8427 DOCREV CUR MEDS BY ELIG CLIN: HCPCS | Performed by: INTERNAL MEDICINE

## 2019-07-05 PROCEDURE — 99214 OFFICE O/P EST MOD 30 MIN: CPT | Performed by: INTERNAL MEDICINE

## 2019-07-05 RX ORDER — FLUDROCORTISONE ACETATE 0.1 MG/1
TABLET ORAL
Qty: 30 TABLET | Refills: 5 | Status: ON HOLD | OUTPATIENT
Start: 2019-07-05 | End: 2019-10-10 | Stop reason: ALTCHOICE

## 2019-07-05 RX ORDER — CLOPIDOGREL BISULFATE 75 MG/1
75 TABLET ORAL DAILY
Qty: 90 TABLET | Refills: 1 | Status: SHIPPED | OUTPATIENT
Start: 2019-07-05 | End: 2020-10-06

## 2019-07-05 NOTE — PROGRESS NOTES
by mouth daily 90 tablet 1    donepezil (ARICEPT) 10 MG tablet Take 1 tablet by mouth nightly 90 tablet 1    atorvastatin (LIPITOR) 20 MG tablet Take 1 tablet by mouth daily 90 tablet 1    pioglitazone (ACTOS) 45 MG tablet Take 1 tablet by mouth daily 90 tablet 1    sertraline (ZOLOFT) 50 MG tablet Take 1 tablet by mouth daily 90 tablet 1    SITagliptin (JANUVIA) 100 MG tablet Take 1 tablet by mouth daily 90 tablet 1    levothyroxine (SYNTHROID) 50 MCG tablet Take 1 tablet by mouth Daily 90 tablet 1    docusate sodium (COLACE, DULCOLAX) 100 MG CAPS Take 100 mg by mouth 2 times daily 60 capsule 5    Cholecalciferol (VITAMIN D3) 5000 units TABS Take by mouth every morning Over The Counter       No current facility-administered medications for this visit. Allergies: Patient has no known allergies. Past Medical History:   Diagnosis Date    Arthritis     \"Back, Knees\"    Asthma     Back pain     \"At Times\"    CAD (coronary artery disease)     Diabetes mellitus ([address]     dx 5+ yrs ago-     Glaucoma     Bilateral Eyes    H/O 24 hour EKG monitoring 11-    Predominantly SR - avg rate of 69bpm. Lowest rate 46 bpm at 0622. Frequent isolated 5954 PVCs noted mostly in bigemenal pattern w/out complex arrhythmias. Five beat run of atrial tachycardia at 1228. No palpitations or dizziness reported in patient's daily activity report. (12-, )    H/O cardiac catheterization 06-    Noncritical diffuse CAD w/no critical stenosis. Diag borderline significant stenosis, not large enough fo percutaneous intervention. No significant angiographic progression of atherosclerosis since cath in 1997.   ( per Dr. Lindsey Alvarado at [facility]. Preserved vent function. MVP. CAD w/normal LM, 30-40% stenosis LAD, 70-80% stenosis of ostium & prox seg diag branch, normal left CX & RCA.)    H/O cardiovascular stress test 1/30/2013, 06- 1/30/2013-Normal study. Normal perfusion in all regions. EF 62%. 06--EF=60%. Normal. Normal pattern of perfusion. LV normal size. No wall abnormality. Exercise capacity good. (05-, 12-, 06-, , )    H/O chest pain     H/O chest x-ray 06-    Negative. Dx was dyspnea and CAD.  (02-)    H/O dizziness     H/O Doppler ultrasound 11-    (Carotid) Intimal thickening but no significant atherosclerotic plaque noted in right or left ICA. Doppler flow velocities w/in right and left ICA WNL.  H/O echocardiogram 1/30/2013, 05-    1/30/2013- LVSF normal. EF 50-55%. Impaired LV relaxation. Trace MR. Trace TR;   5- Normal LV size and systolic function. EF=60%. Chamber dimensions WNL. Mild concentric LV hypertrophy. Valves appear structurally normal.-OICC       H/O gastroesophageal reflux (GERD)     H/O pneumothorax Dx 1960    \"Both Sides\"    History of cardiac monitoring 12/03/2018    9 beat run of SVT, no other arrhythmias noted, primarily sinus rythym    History of complete ECG 06/06/2011    (06-, 07-, 06-, 06-)    History of exercise stress test 05/21/2019    Treadmill, Stopped due to fatigue and  Dyspnea.  History of stress test 11/07/2018    EF 61%, Normal    [place] (hard of hearing)     Bilateral Hearing Aids    Hx of CT scan of chest 11/12/2018    3 mm indeterminate solid nonobstructing endobronchial nodule of the proximal left upper lobe bronchus. Further evaluation with endoscopy is recommended. Bilateral basilar predominant intersitial changes suggestive of nonspecific interstitial pneumonitis.     Hx of Doppler echocardiogram 11/20/2018    EF55-60%,no valvular disease    Hyperlipidemia     Hypertension     Left Lung Cancer Dx 11-18    LEFT UPPER LOBECTOMY 1/28/19    Lung nodule     \"they say I have a spot on my lung- and mass in left lung so thats why doing the bronch\"( 12/7/2018)    Memory loss     MVP (mitral valve prolapse)     follows with Dr Lucila Mistry

## 2019-07-08 ENCOUNTER — APPOINTMENT (OUTPATIENT)
Dept: CARDIAC REHAB | Age: 79
End: 2019-07-08
Payer: MEDICARE

## 2019-07-12 ENCOUNTER — HOSPITAL ENCOUNTER (OUTPATIENT)
Dept: PULMONOLOGY | Age: 79
Discharge: HOME | End: 2019-07-12
Payer: MEDICARE

## 2019-07-12 PROCEDURE — 94618 PULMONARY STRESS TESTING: CPT

## 2019-07-12 PROCEDURE — 94762 N-INVAS EAR/PLS OXIMTRY CONT: CPT

## 2019-07-12 NOTE — PROGRESS NOTES
7/12/2019 1:25 PM  Patient Room #: Room/bed info not found  Patient Name: Tanner Ritchie                    [x] Patient Qualifies nocturnal      (Step 1 Done by RN if possible otherwise call Pulmonary Diagnostics)  1. Place patient on room air at rest for at least 30 minutes. If patient falls below 88% before 30 minutes then you can record the level and stop. Record room air saturation level 97__ %. If patient is at 88% or below, they will qualify for home oxygen and you can stop. If level does not fall below 88%, fill in level above. If indicated continue to Step 2. Signature: Jovi Mcdowell RRT Date: 07/12/2019  (Step 2&3 Done by P)  2. Ambulate patient on room air until saturation falls below 89%. Record level of room air saturation with ambulation_90_ %. Next, place patient back on ___lpm oxygen and ambulate, record level __%. (Note:  this level must show improvement from room air level done with ambulation.)  If patients saturation on room air with ambulation is 88% or below AND patient shows improvement with oxygen during ambulation, they will qualify for home oxygen and you can stop. If patient does not drop below 89%, then patient should have an overnight oximetry trending on room air to see if level falls below 88%. Complete level in Step 3 below. 3. Room air overnight oximetry level 88 % for_29:32_  cumulative minutes. If patients room air oxygen level is < 89% for at least 5 cumulative minutes, patient will qualify for home oxygen and you can stop. (Attach Night Trending Report)  Complete order below: Diagnosis:_ASTHMA_________  Home oxygen at:  Length of Need: X Lifetime ?  3 Months     _2__lpm or __%   via  [x] nasal cannula  []mask  [] other:___         []continuous []  with activity  [x]  Nocturnal   [x] Portable Tanks [x]  Concentrator        Therapist Signature:_Jefferson Diggs RRT______     Date:  07/12/2019    Physician Signature:  ________________________ Date: ___________    Physician Printed Name:  Yamile Calles M.D.___   NPI # _[id number]    [x] Patient Qualifies      [] Patient Does NOT qualify

## 2019-07-15 ENCOUNTER — APPOINTMENT (OUTPATIENT)
Dept: CARDIAC REHAB | Age: 79
End: 2019-07-15
Payer: MEDICARE

## 2019-07-16 ENCOUNTER — APPOINTMENT (OUTPATIENT)
Dept: CARDIAC REHAB | Age: 79
End: 2019-07-16
Payer: MEDICARE

## 2019-07-18 ENCOUNTER — TELEPHONE (OUTPATIENT)
Dept: CARDIOLOGY CLINIC | Age: 79
End: 2019-07-18

## 2019-08-22 ENCOUNTER — HOSPITAL ENCOUNTER (OUTPATIENT)
Age: 79
Discharge: HOME | End: 2019-08-22
Payer: MEDICARE

## 2019-08-22 ENCOUNTER — HOSPITAL ENCOUNTER (OUTPATIENT)
Dept: PULMONOLOGY | Age: 79
Discharge: HOME | End: 2019-08-22
Payer: MEDICARE

## 2019-08-22 LAB
BASOPHILS ABSOLUTE: 0.1 K/CU MM
BASOPHILS RELATIVE PERCENT: 0.9 % (ref 0–1)
DIFFERENTIAL TYPE: ABNORMAL
DLCO %PRED: 33 %
DLCO PRED: NORMAL ML/MIN/MMHG
DLCO/VA %PRED: NORMAL %
DLCO/VA PRED: NORMAL ML/MIN/MMHG
DLCO/VA: NORMAL ML/MIN/MMHG
DLCO: NORMAL ML/MIN/MMHG
EOSINOPHILS ABSOLUTE: 0.2 K/CU MM
EOSINOPHILS RELATIVE PERCENT: 2.3 % (ref 0–3)
EXPIRATORY TIME-POST: NORMAL SEC
EXPIRATORY TIME: NORMAL SEC
FEF 25-75% %CHNG: NORMAL
FEF 25-75% %PRED-POST: NORMAL %
FEF 25-75% %PRED-PRE: NORMAL L/SEC
FEF 25-75% PRED: NORMAL L/SEC
FEF 25-75%-POST: NORMAL L/SEC
FEF 25-75%-PRE: NORMAL L/SEC
FERRITIN: 207 NG/ML (ref 30–400)
FEV1 %PRED-POST: 44 %
FEV1 %PRED-PRE: 45 %
FEV1 PRED: NORMAL L
FEV1-POST: NORMAL L
FEV1-PRE: NORMAL L
FEV1/FVC %PRED-POST: NORMAL %
FEV1/FVC %PRED-PRE: NORMAL %
FEV1/FVC PRED: NORMAL %
FEV1/FVC-POST: 120 %
FEV1/FVC-PRE: 114 %
FOLATE: 18.7 NG/ML (ref 3.1–17.5)
FVC %PRED-POST: NORMAL L
FVC %PRED-PRE: NORMAL %
FVC PRED: NORMAL L
FVC-POST: NORMAL L
FVC-PRE: NORMAL L
GAW %PRED: NORMAL %
GAW PRED: NORMAL L/S/CMH2O
GAW: NORMAL L/S/CMH2O
HCT VFR BLD CALC: 43.7 % (ref 42–52)
HEMOGLOBIN: 13.5 GM/DL (ref 13.5–18)
IC %PRED: NORMAL %
IC PRED: NORMAL L
IC: NORMAL L
IMMATURE NEUTROPHIL %: 0.4 % (ref 0–0.43)
IRON: 97 UG/DL (ref 59–158)
LYMPHOCYTES ABSOLUTE: 2.4 K/CU MM
LYMPHOCYTES RELATIVE PERCENT: 32.5 % (ref 24–44)
MCH RBC QN AUTO: 30.1 PG (ref 27–31)
MCHC RBC AUTO-ENTMCNC: 30.9 % (ref 32–36)
MCV RBC AUTO: 97.5 FL (ref 78–100)
MEP: NORMAL
MIP: NORMAL
MONOCYTES ABSOLUTE: 0.4 K/CU MM
MONOCYTES RELATIVE PERCENT: 5.7 % (ref 0–4)
MVV %PRED-PRE: NORMAL %
MVV PRED: NORMAL L/MIN
MVV-PRE: NORMAL L/MIN
NUCLEATED RBC %: 0 %
PCT TRANSFERRIN: 32 % (ref 10–44)
PDW BLD-RTO: 14.5 % (ref 11.7–14.9)
PEF %PRED-POST: NORMAL %
PEF %PRED-PRE: NORMAL L/SEC
PEF PRED: NORMAL L/SEC
PEF%CHNG: NORMAL
PEF-POST: NORMAL L/SEC
PEF-PRE: NORMAL L/SEC
PLATELET # BLD: 173 K/CU MM (ref 140–440)
PMV BLD AUTO: 10.6 FL (ref 7.5–11.1)
RAW %PRED: NORMAL %
RAW PRED: NORMAL CMH2O/L/S
RAW: NORMAL CMH2O/L/S
RBC # BLD: 4.48 M/CU MM (ref 4.6–6.2)
RV %PRED: NORMAL %
RV PRED: NORMAL L
RV: NORMAL L
SEGMENTED NEUTROPHILS ABSOLUTE COUNT: 4.3 K/CU MM
SEGMENTED NEUTROPHILS RELATIVE PERCENT: 58.2 % (ref 36–66)
SVC %PRED: NORMAL %
SVC PRED: NORMAL L
SVC: NORMAL L
TLC %PRED: 45 %
TLC PRED: NORMAL L
TLC: NORMAL L
TOTAL IMMATURE NEUTOROPHIL: 0.03 K/CU MM
TOTAL IRON BINDING CAPACITY: 303 UG/DL (ref 250–450)
TOTAL NUCLEATED RBC: 0 K/CU MM
UNSATURATED IRON BINDING CAPACITY: 206 UG/DL (ref 110–370)
VA %PRED: NORMAL %
VA PRED: NORMAL L
VA: NORMAL L
VITAMIN B-12: 635.8 PG/ML (ref 211–911)
VTG %PRED: NORMAL %
VTG PRED: NORMAL L
VTG: NORMAL L
WBC # BLD: 7.4 K/CU MM (ref 4–10.5)

## 2019-08-22 PROCEDURE — 82607 VITAMIN B-12: CPT

## 2019-08-22 PROCEDURE — 82746 ASSAY OF FOLIC ACID SERUM: CPT

## 2019-08-22 PROCEDURE — 94060 EVALUATION OF WHEEZING: CPT

## 2019-08-22 PROCEDURE — 83540 ASSAY OF IRON: CPT

## 2019-08-22 PROCEDURE — 94727 GAS DIL/WSHOT DETER LNG VOL: CPT

## 2019-08-22 PROCEDURE — 85025 COMPLETE CBC W/AUTO DIFF WBC: CPT

## 2019-08-22 PROCEDURE — 82728 ASSAY OF FERRITIN: CPT

## 2019-08-22 PROCEDURE — 94729 DIFFUSING CAPACITY: CPT

## 2019-08-22 PROCEDURE — 83550 IRON BINDING TEST: CPT

## 2019-08-22 PROCEDURE — 36415 COLL VENOUS BLD VENIPUNCTURE: CPT

## 2019-08-22 PROCEDURE — 83036 HEMOGLOBIN GLYCOSYLATED A1C: CPT

## 2019-08-22 ASSESSMENT — PULMONARY FUNCTION TESTS
FEV1/FVC_POST: 120
FEV1/FVC_PRE: 114
FEV1_PERCENT_PREDICTED_PRE: 45
FEV1_PERCENT_PREDICTED_POST: 44

## 2019-08-26 LAB
ESTIMATED AVERAGE GLUCOSE: 151 MG/DL
HBA1C MFR BLD: 6.9 % (ref 4.2–6.3)

## 2019-09-04 ENCOUNTER — HOSPITAL ENCOUNTER (OUTPATIENT)
Dept: CARDIAC REHAB | Age: 79
Setting detail: THERAPIES SERIES
Discharge: HOME | End: 2019-09-04
Payer: MEDICARE

## 2019-09-09 ENCOUNTER — HOSPITAL ENCOUNTER (OUTPATIENT)
Dept: CARDIAC REHAB | Age: 79
Setting detail: THERAPIES SERIES
Discharge: HOME | End: 2019-09-09
Payer: MEDICARE

## 2019-09-09 LAB — GLUCOSE BLD-MCNC: 135 MG/DL (ref 70–99)

## 2019-09-09 PROCEDURE — 82962 GLUCOSE BLOOD TEST: CPT

## 2019-09-09 PROCEDURE — G0424 PULMONARY REHAB W EXER: HCPCS

## 2019-09-12 ENCOUNTER — HOSPITAL ENCOUNTER (OUTPATIENT)
Dept: CARDIAC REHAB | Age: 79
Setting detail: THERAPIES SERIES
Discharge: HOME | End: 2019-09-12
Payer: MEDICARE

## 2019-09-12 PROCEDURE — G0424 PULMONARY REHAB W EXER: HCPCS

## 2019-09-16 ENCOUNTER — HOSPITAL ENCOUNTER (OUTPATIENT)
Dept: CARDIAC REHAB | Age: 79
Setting detail: THERAPIES SERIES
Discharge: HOME | End: 2019-09-16
Payer: MEDICARE

## 2019-09-16 LAB
GLUCOSE BLD-MCNC: 116 MG/DL (ref 70–99)
GLUCOSE BLD-MCNC: 155 MG/DL (ref 70–99)

## 2019-09-16 PROCEDURE — G0424 PULMONARY REHAB W EXER: HCPCS

## 2019-09-16 PROCEDURE — 82962 GLUCOSE BLOOD TEST: CPT

## 2019-09-17 ENCOUNTER — HOSPITAL ENCOUNTER (OUTPATIENT)
Dept: CARDIAC REHAB | Age: 79
Setting detail: THERAPIES SERIES
Discharge: HOME | End: 2019-09-17
Payer: MEDICARE

## 2019-09-17 PROCEDURE — G0424 PULMONARY REHAB W EXER: HCPCS

## 2019-09-19 ENCOUNTER — HOSPITAL ENCOUNTER (OUTPATIENT)
Dept: CARDIAC REHAB | Age: 79
Setting detail: THERAPIES SERIES
Discharge: HOME | End: 2019-09-19
Payer: MEDICARE

## 2019-09-19 LAB
GLUCOSE BLD-MCNC: 116 MG/DL (ref 70–99)
GLUCOSE BLD-MCNC: 134 MG/DL (ref 70–99)

## 2019-09-19 PROCEDURE — G0424 PULMONARY REHAB W EXER: HCPCS

## 2019-09-19 PROCEDURE — 82962 GLUCOSE BLOOD TEST: CPT

## 2019-09-23 ENCOUNTER — HOSPITAL ENCOUNTER (OUTPATIENT)
Dept: CARDIAC REHAB | Age: 79
Setting detail: THERAPIES SERIES
Discharge: HOME | End: 2019-09-23
Payer: MEDICARE

## 2019-09-23 LAB
GLUCOSE BLD-MCNC: 139 MG/DL (ref 70–99)
GLUCOSE BLD-MCNC: 178 MG/DL (ref 70–99)

## 2019-09-23 PROCEDURE — 82962 GLUCOSE BLOOD TEST: CPT

## 2019-09-23 PROCEDURE — G0424 PULMONARY REHAB W EXER: HCPCS

## 2019-09-24 ENCOUNTER — HOSPITAL ENCOUNTER (OUTPATIENT)
Dept: CARDIAC REHAB | Age: 79
Setting detail: THERAPIES SERIES
Discharge: HOME | End: 2019-09-24
Payer: MEDICARE

## 2019-09-24 LAB — GLUCOSE BLD-MCNC: 163 MG/DL (ref 70–99)

## 2019-09-24 PROCEDURE — 93798 PHYS/QHP OP CAR RHAB W/ECG: CPT

## 2019-09-24 PROCEDURE — 82962 GLUCOSE BLOOD TEST: CPT

## 2019-09-26 ENCOUNTER — HOSPITAL ENCOUNTER (OUTPATIENT)
Dept: CARDIAC REHAB | Age: 79
Setting detail: THERAPIES SERIES
Discharge: HOME | End: 2019-09-26
Payer: MEDICARE

## 2019-09-26 LAB
GLUCOSE BLD-MCNC: 151 MG/DL (ref 70–99)
GLUCOSE BLD-MCNC: 178 MG/DL (ref 70–99)

## 2019-09-26 PROCEDURE — 82962 GLUCOSE BLOOD TEST: CPT

## 2019-09-26 PROCEDURE — G0424 PULMONARY REHAB W EXER: HCPCS

## 2019-09-30 ENCOUNTER — HOSPITAL ENCOUNTER (OUTPATIENT)
Dept: CARDIAC REHAB | Age: 79
Setting detail: THERAPIES SERIES
Discharge: HOME | End: 2019-09-30
Payer: MEDICARE

## 2019-09-30 PROCEDURE — G0424 PULMONARY REHAB W EXER: HCPCS

## 2019-10-01 ENCOUNTER — HOSPITAL ENCOUNTER (OUTPATIENT)
Dept: CARDIAC REHAB | Age: 79
Setting detail: THERAPIES SERIES
Discharge: HOME | End: 2019-10-01
Payer: MEDICARE

## 2019-10-01 PROCEDURE — G0424 PULMONARY REHAB W EXER: HCPCS

## 2019-10-03 ENCOUNTER — HOSPITAL ENCOUNTER (OUTPATIENT)
Dept: CARDIAC REHAB | Age: 79
Setting detail: THERAPIES SERIES
Discharge: HOME | End: 2019-10-03
Payer: MEDICARE

## 2019-10-03 PROCEDURE — G0424 PULMONARY REHAB W EXER: HCPCS

## 2019-10-03 PROCEDURE — 93798 PHYS/QHP OP CAR RHAB W/ECG: CPT

## 2019-10-08 ENCOUNTER — HOSPITAL ENCOUNTER (OUTPATIENT)
Dept: CARDIAC REHAB | Age: 79
Setting detail: THERAPIES SERIES
Discharge: HOME | End: 2019-10-08
Payer: MEDICARE

## 2019-10-08 PROCEDURE — G0424 PULMONARY REHAB W EXER: HCPCS

## 2019-10-09 ENCOUNTER — APPOINTMENT (OUTPATIENT)
Dept: CT IMAGING | Age: 79
DRG: 196 | End: 2019-10-09
Payer: MEDICARE

## 2019-10-09 ENCOUNTER — APPOINTMENT (OUTPATIENT)
Dept: GENERAL RADIOLOGY | Age: 79
DRG: 196 | End: 2019-10-09
Payer: MEDICARE

## 2019-10-09 ENCOUNTER — HOSPITAL ENCOUNTER (INPATIENT)
Age: 79
LOS: 3 days | Discharge: HOME HEALTH | DRG: 196 | End: 2019-10-12
Attending: FAMILY MEDICINE | Admitting: INTERNAL MEDICINE
Payer: MEDICARE

## 2019-10-09 DIAGNOSIS — N30.01 ACUTE CYSTITIS WITH HEMATURIA: ICD-10-CM

## 2019-10-09 DIAGNOSIS — K46.9 NON-RECURRENT ABDOMINAL HERNIA WITHOUT OBSTRUCTION OR GANGRENE, UNSPECIFIED HERNIA TYPE: ICD-10-CM

## 2019-10-09 DIAGNOSIS — J90 PLEURAL EFFUSION: ICD-10-CM

## 2019-10-09 DIAGNOSIS — R53.1 GENERAL WEAKNESS: Primary | ICD-10-CM

## 2019-10-09 DIAGNOSIS — R06.00 DYSPNEA AND RESPIRATORY ABNORMALITIES: ICD-10-CM

## 2019-10-09 DIAGNOSIS — J18.9 PNEUMONIA DUE TO ORGANISM: ICD-10-CM

## 2019-10-09 DIAGNOSIS — E83.42 HYPOMAGNESEMIA: ICD-10-CM

## 2019-10-09 DIAGNOSIS — R06.89 DYSPNEA AND RESPIRATORY ABNORMALITIES: ICD-10-CM

## 2019-10-09 PROBLEM — R06.02 SHORTNESS OF BREATH: Status: ACTIVE | Noted: 2019-10-09

## 2019-10-09 LAB
ADENOVIRUS DETECTION BY PCR: NOT DETECTED
ALBUMIN SERPL-MCNC: 3.7 GM/DL (ref 3.4–5)
ALP BLD-CCNC: 58 IU/L (ref 40–128)
ALT SERPL-CCNC: 8 U/L (ref 10–40)
ANION GAP SERPL CALCULATED.3IONS-SCNC: 11 MMOL/L (ref 4–16)
AST SERPL-CCNC: 13 IU/L (ref 15–37)
BACTERIA: ABNORMAL /HPF
BASOPHILS ABSOLUTE: 0.1 K/CU MM
BASOPHILS RELATIVE PERCENT: 0.6 % (ref 0–1)
BILIRUB SERPL-MCNC: 0.7 MG/DL (ref 0–1)
BILIRUBIN URINE: NEGATIVE MG/DL
BLOOD, URINE: ABNORMAL
BORDETELLA PERTUSSIS PCR: NOT DETECTED
BUN BLDV-MCNC: 16 MG/DL (ref 6–23)
CALCIUM SERPL-MCNC: 9.5 MG/DL (ref 8.3–10.6)
CHLAMYDOPHILA PNEUMONIA PCR: NOT DETECTED
CHLORIDE BLD-SCNC: 99 MMOL/L (ref 99–110)
CLARITY: CLEAR
CO2: 25 MMOL/L (ref 21–32)
COLOR: YELLOW
CORONAVIRUS 229E PCR: NOT DETECTED
CORONAVIRUS HKU1 PCR: NOT DETECTED
CORONAVIRUS NL63 PCR: NOT DETECTED
CORONAVIRUS OC43 PCR: NOT DETECTED
CREAT SERPL-MCNC: 1.2 MG/DL (ref 0.9–1.3)
DIFFERENTIAL TYPE: ABNORMAL
EOSINOPHILS ABSOLUTE: 0 K/CU MM
EOSINOPHILS RELATIVE PERCENT: 0.3 % (ref 0–3)
GFR AFRICAN AMERICAN: >60 ML/MIN/1.73M2
GFR NON-AFRICAN AMERICAN: 59 ML/MIN/1.73M2
GLUCOSE BLD-MCNC: 144 MG/DL (ref 70–99)
GLUCOSE, URINE: NEGATIVE MG/DL
HCT VFR BLD CALC: 40.6 % (ref 42–52)
HEMOGLOBIN: 12.7 GM/DL (ref 13.5–18)
HUMAN METAPNEUMOVIRUS PCR: NOT DETECTED
IMMATURE NEUTROPHIL %: 0.6 % (ref 0–0.43)
INFLUENZA A BY PCR: NOT DETECTED
INFLUENZA A H1 (2009) PCR: NOT DETECTED
INFLUENZA A H1 PANDEMIC PCR: NOT DETECTED
INFLUENZA A H3 PCR: NOT DETECTED
INFLUENZA B BY PCR: NOT DETECTED
KETONES, URINE: ABNORMAL MG/DL
LACTATE: 1.1 MMOL/L (ref 0.4–2)
LEUKOCYTE ESTERASE, URINE: ABNORMAL
LYMPHOCYTES ABSOLUTE: 2.2 K/CU MM
LYMPHOCYTES RELATIVE PERCENT: 17.2 % (ref 24–44)
MAGNESIUM: 1.7 MG/DL (ref 1.8–2.4)
MCH RBC QN AUTO: 30 PG (ref 27–31)
MCHC RBC AUTO-ENTMCNC: 31.3 % (ref 32–36)
MCV RBC AUTO: 96 FL (ref 78–100)
MONOCYTES ABSOLUTE: 1.1 K/CU MM
MONOCYTES RELATIVE PERCENT: 8.4 % (ref 0–4)
MUCUS: ABNORMAL HPF
MYCOPLASMA PNEUMONIAE PCR: NOT DETECTED
NITRITE URINE, QUANTITATIVE: POSITIVE
NUCLEATED RBC %: 0 %
PARAINFLUENZA 1 PCR: NOT DETECTED
PARAINFLUENZA 2 PCR: NOT DETECTED
PARAINFLUENZA 3 PCR: NOT DETECTED
PARAINFLUENZA 4 PCR: NOT DETECTED
PDW BLD-RTO: 15 % (ref 11.7–14.9)
PH, URINE: 5 (ref 5–8)
PLATELET # BLD: 156 K/CU MM (ref 140–440)
PMV BLD AUTO: 11.1 FL (ref 7.5–11.1)
POTASSIUM SERPL-SCNC: 3.9 MMOL/L (ref 3.5–5.1)
PROTEIN UA: NEGATIVE MG/DL
RBC # BLD: 4.23 M/CU MM (ref 4.6–6.2)
RBC URINE: 1 /HPF (ref 0–3)
RHINOVIRUS ENTEROVIRUS PCR: NOT DETECTED
RSV PCR: NOT DETECTED
SEGMENTED NEUTROPHILS ABSOLUTE COUNT: 9.5 K/CU MM
SEGMENTED NEUTROPHILS RELATIVE PERCENT: 72.9 % (ref 36–66)
SODIUM BLD-SCNC: 135 MMOL/L (ref 135–145)
SPECIFIC GRAVITY UA: 1.02 (ref 1–1.03)
SQUAMOUS EPITHELIAL: <1 /HPF
TOTAL IMMATURE NEUTOROPHIL: 0.08 K/CU MM
TOTAL NUCLEATED RBC: 0 K/CU MM
TOTAL PROTEIN: 7.2 GM/DL (ref 6.4–8.2)
TRICHOMONAS: ABNORMAL /HPF
TROPONIN T: <0.01 NG/ML
UROBILINOGEN, URINE: NORMAL MG/DL (ref 0.2–1)
WBC # BLD: 13 K/CU MM (ref 4–10.5)
WBC CLUMP: ABNORMAL /HPF
WBC UA: 18 /HPF (ref 0–2)

## 2019-10-09 PROCEDURE — 87633 RESP VIRUS 12-25 TARGETS: CPT

## 2019-10-09 PROCEDURE — 93005 ELECTROCARDIOGRAM TRACING: CPT | Performed by: FAMILY MEDICINE

## 2019-10-09 PROCEDURE — 6360000004 HC RX CONTRAST MEDICATION: Performed by: PHYSICIAN ASSISTANT

## 2019-10-09 PROCEDURE — 96365 THER/PROPH/DIAG IV INF INIT: CPT

## 2019-10-09 PROCEDURE — 80053 COMPREHEN METABOLIC PANEL: CPT

## 2019-10-09 PROCEDURE — 84484 ASSAY OF TROPONIN QUANT: CPT

## 2019-10-09 PROCEDURE — 87086 URINE CULTURE/COLONY COUNT: CPT

## 2019-10-09 PROCEDURE — 87581 M.PNEUMON DNA AMP PROBE: CPT

## 2019-10-09 PROCEDURE — 74177 CT ABD & PELVIS W/CONTRAST: CPT

## 2019-10-09 PROCEDURE — 99285 EMERGENCY DEPT VISIT HI MDM: CPT

## 2019-10-09 PROCEDURE — 1200000000 HC SEMI PRIVATE

## 2019-10-09 PROCEDURE — 6360000002 HC RX W HCPCS: Performed by: FAMILY MEDICINE

## 2019-10-09 PROCEDURE — 87486 CHLMYD PNEUM DNA AMP PROBE: CPT

## 2019-10-09 PROCEDURE — 93005 ELECTROCARDIOGRAM TRACING: CPT | Performed by: PHYSICIAN ASSISTANT

## 2019-10-09 PROCEDURE — 6370000000 HC RX 637 (ALT 250 FOR IP): Performed by: PHYSICIAN ASSISTANT

## 2019-10-09 PROCEDURE — 87077 CULTURE AEROBIC IDENTIFY: CPT

## 2019-10-09 PROCEDURE — 96375 TX/PRO/DX INJ NEW DRUG ADDON: CPT

## 2019-10-09 PROCEDURE — 6360000002 HC RX W HCPCS: Performed by: PHYSICIAN ASSISTANT

## 2019-10-09 PROCEDURE — 85025 COMPLETE CBC W/AUTO DIFF WBC: CPT

## 2019-10-09 PROCEDURE — 36415 COLL VENOUS BLD VENIPUNCTURE: CPT

## 2019-10-09 PROCEDURE — 83605 ASSAY OF LACTIC ACID: CPT

## 2019-10-09 PROCEDURE — 96367 TX/PROPH/DG ADDL SEQ IV INF: CPT

## 2019-10-09 PROCEDURE — 87040 BLOOD CULTURE FOR BACTERIA: CPT

## 2019-10-09 PROCEDURE — 87798 DETECT AGENT NOS DNA AMP: CPT

## 2019-10-09 PROCEDURE — 71045 X-RAY EXAM CHEST 1 VIEW: CPT

## 2019-10-09 PROCEDURE — 83735 ASSAY OF MAGNESIUM: CPT

## 2019-10-09 PROCEDURE — 87186 SC STD MICRODIL/AGAR DIL: CPT

## 2019-10-09 PROCEDURE — 81001 URINALYSIS AUTO W/SCOPE: CPT

## 2019-10-09 PROCEDURE — 2580000003 HC RX 258: Performed by: FAMILY MEDICINE

## 2019-10-09 RX ORDER — AZITHROMYCIN 500 MG/1
500 INJECTION, POWDER, LYOPHILIZED, FOR SOLUTION INTRAVENOUS ONCE
Status: DISCONTINUED | OUTPATIENT
Start: 2019-10-09 | End: 2019-10-09 | Stop reason: SDUPTHER

## 2019-10-09 RX ORDER — 0.9 % SODIUM CHLORIDE 0.9 %
1000 INTRAVENOUS SOLUTION INTRAVENOUS ONCE
Status: COMPLETED | OUTPATIENT
Start: 2019-10-09 | End: 2019-10-10

## 2019-10-09 RX ORDER — MORPHINE SULFATE 4 MG/ML
4 INJECTION, SOLUTION INTRAMUSCULAR; INTRAVENOUS ONCE
Status: COMPLETED | OUTPATIENT
Start: 2019-10-09 | End: 2019-10-09

## 2019-10-09 RX ADMIN — AZITHROMYCIN MONOHYDRATE 500 MG: 500 INJECTION, POWDER, LYOPHILIZED, FOR SOLUTION INTRAVENOUS at 22:47

## 2019-10-09 RX ADMIN — MAGNESIUM GLUCONATE 500 MG ORAL TABLET 400 MG: 500 TABLET ORAL at 23:34

## 2019-10-09 RX ADMIN — IOPAMIDOL 80 ML: 755 INJECTION, SOLUTION INTRAVENOUS at 21:16

## 2019-10-09 RX ADMIN — SODIUM CHLORIDE 1000 ML: 9 INJECTION, SOLUTION INTRAVENOUS at 20:51

## 2019-10-09 RX ADMIN — MORPHINE SULFATE 4 MG: 4 INJECTION, SOLUTION INTRAMUSCULAR; INTRAVENOUS at 22:07

## 2019-10-09 RX ADMIN — CEFTRIAXONE SODIUM 2 G: 2 INJECTION, POWDER, FOR SOLUTION INTRAMUSCULAR; INTRAVENOUS at 22:00

## 2019-10-09 ASSESSMENT — PAIN SCALES - GENERAL
PAINLEVEL_OUTOF10: 8
PAINLEVEL_OUTOF10: 3
PAINLEVEL_OUTOF10: 4

## 2019-10-09 ASSESSMENT — PAIN DESCRIPTION - LOCATION
LOCATION: BACK
LOCATION: BACK

## 2019-10-09 ASSESSMENT — PAIN DESCRIPTION - PAIN TYPE: TYPE: CHRONIC PAIN

## 2019-10-09 ASSESSMENT — PAIN DESCRIPTION - ORIENTATION
ORIENTATION: LOWER
ORIENTATION: RIGHT;LEFT;LOWER

## 2019-10-09 ASSESSMENT — PAIN DESCRIPTION - FREQUENCY: FREQUENCY: CONTINUOUS

## 2019-10-10 ENCOUNTER — APPOINTMENT (OUTPATIENT)
Dept: CT IMAGING | Age: 79
DRG: 196 | End: 2019-10-10
Payer: MEDICARE

## 2019-10-10 LAB
ANION GAP SERPL CALCULATED.3IONS-SCNC: 10 MMOL/L (ref 4–16)
BASOPHILS ABSOLUTE: 0.1 K/CU MM
BASOPHILS RELATIVE PERCENT: 0.5 % (ref 0–1)
BUN BLDV-MCNC: 15 MG/DL (ref 6–23)
CALCIUM SERPL-MCNC: 8.7 MG/DL (ref 8.3–10.6)
CHLORIDE BLD-SCNC: 102 MMOL/L (ref 99–110)
CO2: 28 MMOL/L (ref 21–32)
CREAT SERPL-MCNC: 1.1 MG/DL (ref 0.9–1.3)
DIFFERENTIAL TYPE: ABNORMAL
EKG ATRIAL RATE: 58 BPM
EKG ATRIAL RATE: 66 BPM
EKG DIAGNOSIS: NORMAL
EKG DIAGNOSIS: NORMAL
EKG P AXIS: 22 DEGREES
EKG P AXIS: 25 DEGREES
EKG P-R INTERVAL: 126 MS
EKG P-R INTERVAL: 150 MS
EKG Q-T INTERVAL: 344 MS
EKG Q-T INTERVAL: 458 MS
EKG QRS DURATION: 94 MS
EKG QRS DURATION: 96 MS
EKG QTC CALCULATION (BAZETT): 360 MS
EKG QTC CALCULATION (BAZETT): 449 MS
EKG R AXIS: -3 DEGREES
EKG R AXIS: 33 DEGREES
EKG T AXIS: 29 DEGREES
EKG T AXIS: 5 DEGREES
EKG VENTRICULAR RATE: 58 BPM
EKG VENTRICULAR RATE: 66 BPM
EOSINOPHILS ABSOLUTE: 0 K/CU MM
EOSINOPHILS RELATIVE PERCENT: 0.3 % (ref 0–3)
GFR AFRICAN AMERICAN: >60 ML/MIN/1.73M2
GFR NON-AFRICAN AMERICAN: >60 ML/MIN/1.73M2
GLUCOSE BLD-MCNC: 120 MG/DL (ref 70–99)
GLUCOSE BLD-MCNC: 122 MG/DL (ref 70–99)
GLUCOSE BLD-MCNC: 144 MG/DL (ref 70–99)
GLUCOSE BLD-MCNC: 147 MG/DL (ref 70–99)
GLUCOSE BLD-MCNC: 155 MG/DL (ref 70–99)
GLUCOSE BLD-MCNC: 169 MG/DL (ref 70–99)
GLUCOSE BLD-MCNC: 306 MG/DL (ref 70–99)
HCT VFR BLD CALC: 36.9 % (ref 42–52)
HEMOGLOBIN: 11.5 GM/DL (ref 13.5–18)
IMMATURE NEUTROPHIL %: 0.7 % (ref 0–0.43)
LYMPHOCYTES ABSOLUTE: 2.7 K/CU MM
LYMPHOCYTES RELATIVE PERCENT: 21.3 % (ref 24–44)
MAGNESIUM: 1.6 MG/DL (ref 1.8–2.4)
MCH RBC QN AUTO: 29.9 PG (ref 27–31)
MCHC RBC AUTO-ENTMCNC: 31.2 % (ref 32–36)
MCV RBC AUTO: 95.8 FL (ref 78–100)
MONOCYTES ABSOLUTE: 1.3 K/CU MM
MONOCYTES RELATIVE PERCENT: 9.9 % (ref 0–4)
NUCLEATED RBC %: 0 %
PDW BLD-RTO: 14.9 % (ref 11.7–14.9)
PLATELET # BLD: 137 K/CU MM (ref 140–440)
PMV BLD AUTO: 10.7 FL (ref 7.5–11.1)
POTASSIUM SERPL-SCNC: 3.7 MMOL/L (ref 3.5–5.1)
PROCALCITONIN: 0.06
RBC # BLD: 3.85 M/CU MM (ref 4.6–6.2)
SEGMENTED NEUTROPHILS ABSOLUTE COUNT: 8.6 K/CU MM
SEGMENTED NEUTROPHILS RELATIVE PERCENT: 67.3 % (ref 36–66)
SODIUM BLD-SCNC: 140 MMOL/L (ref 135–145)
TOTAL IMMATURE NEUTOROPHIL: 0.09 K/CU MM
TOTAL NUCLEATED RBC: 0 K/CU MM
WBC # BLD: 12.8 K/CU MM (ref 4–10.5)

## 2019-10-10 PROCEDURE — 93010 ELECTROCARDIOGRAM REPORT: CPT | Performed by: INTERNAL MEDICINE

## 2019-10-10 PROCEDURE — 85025 COMPLETE CBC W/AUTO DIFF WBC: CPT

## 2019-10-10 PROCEDURE — C9113 INJ PANTOPRAZOLE SODIUM, VIA: HCPCS | Performed by: NURSE PRACTITIONER

## 2019-10-10 PROCEDURE — 6360000002 HC RX W HCPCS: Performed by: NURSE PRACTITIONER

## 2019-10-10 PROCEDURE — 87186 SC STD MICRODIL/AGAR DIL: CPT

## 2019-10-10 PROCEDURE — 87486 CHLMYD PNEUM DNA AMP PROBE: CPT

## 2019-10-10 PROCEDURE — 87633 RESP VIRUS 12-25 TARGETS: CPT

## 2019-10-10 PROCEDURE — 6360000002 HC RX W HCPCS: Performed by: INTERNAL MEDICINE

## 2019-10-10 PROCEDURE — 6370000000 HC RX 637 (ALT 250 FOR IP): Performed by: NURSE PRACTITIONER

## 2019-10-10 PROCEDURE — 87070 CULTURE OTHR SPECIMN AEROBIC: CPT

## 2019-10-10 PROCEDURE — 87798 DETECT AGENT NOS DNA AMP: CPT

## 2019-10-10 PROCEDURE — 1200000000 HC SEMI PRIVATE

## 2019-10-10 PROCEDURE — 6370000000 HC RX 637 (ALT 250 FOR IP): Performed by: INTERNAL MEDICINE

## 2019-10-10 PROCEDURE — 71250 CT THORAX DX C-: CPT

## 2019-10-10 PROCEDURE — 2580000003 HC RX 258: Performed by: NURSE PRACTITIONER

## 2019-10-10 PROCEDURE — 87147 CULTURE TYPE IMMUNOLOGIC: CPT

## 2019-10-10 PROCEDURE — 94640 AIRWAY INHALATION TREATMENT: CPT

## 2019-10-10 PROCEDURE — 87581 M.PNEUMON DNA AMP PROBE: CPT

## 2019-10-10 PROCEDURE — 82962 GLUCOSE BLOOD TEST: CPT

## 2019-10-10 PROCEDURE — 84145 PROCALCITONIN (PCT): CPT

## 2019-10-10 PROCEDURE — 36415 COLL VENOUS BLD VENIPUNCTURE: CPT

## 2019-10-10 PROCEDURE — 87205 SMEAR GRAM STAIN: CPT

## 2019-10-10 PROCEDURE — 87077 CULTURE AEROBIC IDENTIFY: CPT

## 2019-10-10 PROCEDURE — 87081 CULTURE SCREEN ONLY: CPT

## 2019-10-10 PROCEDURE — 94761 N-INVAS EAR/PLS OXIMETRY MLT: CPT

## 2019-10-10 PROCEDURE — 83735 ASSAY OF MAGNESIUM: CPT

## 2019-10-10 PROCEDURE — 80048 BASIC METABOLIC PNL TOTAL CA: CPT

## 2019-10-10 PROCEDURE — 2700000000 HC OXYGEN THERAPY PER DAY

## 2019-10-10 RX ORDER — MAGNESIUM SULFATE 1 G/100ML
1 INJECTION INTRAVENOUS ONCE
Status: COMPLETED | OUTPATIENT
Start: 2019-10-10 | End: 2019-10-10

## 2019-10-10 RX ORDER — MAGNESIUM SULFATE IN WATER 40 MG/ML
2 INJECTION, SOLUTION INTRAVENOUS ONCE
Status: COMPLETED | OUTPATIENT
Start: 2019-10-10 | End: 2019-10-10

## 2019-10-10 RX ORDER — METHYLPREDNISOLONE SODIUM SUCCINATE 40 MG/ML
40 INJECTION, POWDER, LYOPHILIZED, FOR SOLUTION INTRAMUSCULAR; INTRAVENOUS EVERY 12 HOURS
Status: DISCONTINUED | OUTPATIENT
Start: 2019-10-10 | End: 2019-10-11

## 2019-10-10 RX ORDER — IPRATROPIUM BROMIDE AND ALBUTEROL SULFATE 2.5; .5 MG/3ML; MG/3ML
1 SOLUTION RESPIRATORY (INHALATION)
Status: DISCONTINUED | OUTPATIENT
Start: 2019-10-10 | End: 2019-10-10 | Stop reason: SDUPTHER

## 2019-10-10 RX ORDER — ATORVASTATIN CALCIUM 20 MG/1
20 TABLET, FILM COATED ORAL DAILY
Status: DISCONTINUED | OUTPATIENT
Start: 2019-10-10 | End: 2019-10-12 | Stop reason: HOSPADM

## 2019-10-10 RX ORDER — MORPHINE SULFATE 4 MG/ML
2 INJECTION, SOLUTION INTRAMUSCULAR; INTRAVENOUS EVERY 4 HOURS PRN
Status: DISCONTINUED | OUTPATIENT
Start: 2019-10-10 | End: 2019-10-12 | Stop reason: HOSPADM

## 2019-10-10 RX ORDER — ACETAMINOPHEN 80 MG
TABLET,CHEWABLE ORAL
Status: COMPLETED
Start: 2019-10-10 | End: 2019-10-10

## 2019-10-10 RX ORDER — DONEPEZIL HYDROCHLORIDE 10 MG/1
10 TABLET, FILM COATED ORAL NIGHTLY
Status: DISCONTINUED | OUTPATIENT
Start: 2019-10-10 | End: 2019-10-12 | Stop reason: HOSPADM

## 2019-10-10 RX ORDER — IPRATROPIUM BROMIDE AND ALBUTEROL SULFATE 2.5; .5 MG/3ML; MG/3ML
1 SOLUTION RESPIRATORY (INHALATION)
Status: DISCONTINUED | OUTPATIENT
Start: 2019-10-10 | End: 2019-10-12 | Stop reason: HOSPADM

## 2019-10-10 RX ORDER — ONDANSETRON 2 MG/ML
4 INJECTION INTRAMUSCULAR; INTRAVENOUS EVERY 6 HOURS PRN
Status: DISCONTINUED | OUTPATIENT
Start: 2019-10-10 | End: 2019-10-12 | Stop reason: HOSPADM

## 2019-10-10 RX ORDER — SODIUM CHLORIDE 0.9 % (FLUSH) 0.9 %
10 SYRINGE (ML) INJECTION PRN
Status: DISCONTINUED | OUTPATIENT
Start: 2019-10-10 | End: 2019-10-12 | Stop reason: HOSPADM

## 2019-10-10 RX ORDER — NICOTINE POLACRILEX 4 MG
15 LOZENGE BUCCAL PRN
Status: DISCONTINUED | OUTPATIENT
Start: 2019-10-10 | End: 2019-10-12 | Stop reason: HOSPADM

## 2019-10-10 RX ORDER — SODIUM CHLORIDE 0.9 % (FLUSH) 0.9 %
10 SYRINGE (ML) INJECTION EVERY 12 HOURS SCHEDULED
Status: DISCONTINUED | OUTPATIENT
Start: 2019-10-10 | End: 2019-10-12 | Stop reason: HOSPADM

## 2019-10-10 RX ORDER — CLOPIDOGREL BISULFATE 75 MG/1
75 TABLET ORAL DAILY
Status: DISCONTINUED | OUTPATIENT
Start: 2019-10-10 | End: 2019-10-12 | Stop reason: HOSPADM

## 2019-10-10 RX ORDER — SODIUM CHLORIDE 9 MG/ML
INJECTION, SOLUTION INTRAVENOUS CONTINUOUS
Status: DISCONTINUED | OUTPATIENT
Start: 2019-10-10 | End: 2019-10-10

## 2019-10-10 RX ORDER — ISOSORBIDE MONONITRATE 30 MG/1
30 TABLET, EXTENDED RELEASE ORAL DAILY
Status: DISCONTINUED | OUTPATIENT
Start: 2019-10-10 | End: 2019-10-12 | Stop reason: HOSPADM

## 2019-10-10 RX ORDER — ACETAMINOPHEN 325 MG/1
650 TABLET ORAL EVERY 4 HOURS PRN
Status: DISCONTINUED | OUTPATIENT
Start: 2019-10-10 | End: 2019-10-12 | Stop reason: HOSPADM

## 2019-10-10 RX ORDER — PANTOPRAZOLE SODIUM 40 MG/10ML
40 INJECTION, POWDER, LYOPHILIZED, FOR SOLUTION INTRAVENOUS DAILY
Status: DISCONTINUED | OUTPATIENT
Start: 2019-10-10 | End: 2019-10-12 | Stop reason: HOSPADM

## 2019-10-10 RX ORDER — ASPIRIN 81 MG/1
81 TABLET ORAL EVERY MORNING
Status: DISCONTINUED | OUTPATIENT
Start: 2019-10-10 | End: 2019-10-12 | Stop reason: HOSPADM

## 2019-10-10 RX ORDER — HYDROCODONE BITARTRATE AND ACETAMINOPHEN 5; 325 MG/1; MG/1
1 TABLET ORAL EVERY 6 HOURS PRN
Status: DISCONTINUED | OUTPATIENT
Start: 2019-10-10 | End: 2019-10-12 | Stop reason: HOSPADM

## 2019-10-10 RX ORDER — DEXTROSE MONOHYDRATE 25 G/50ML
12.5 INJECTION, SOLUTION INTRAVENOUS PRN
Status: DISCONTINUED | OUTPATIENT
Start: 2019-10-10 | End: 2019-10-12 | Stop reason: HOSPADM

## 2019-10-10 RX ORDER — LEVOTHYROXINE SODIUM 0.05 MG/1
50 TABLET ORAL DAILY
Status: DISCONTINUED | OUTPATIENT
Start: 2019-10-10 | End: 2019-10-12 | Stop reason: HOSPADM

## 2019-10-10 RX ORDER — DEXTROSE MONOHYDRATE 50 MG/ML
100 INJECTION, SOLUTION INTRAVENOUS PRN
Status: DISCONTINUED | OUTPATIENT
Start: 2019-10-10 | End: 2019-10-12 | Stop reason: HOSPADM

## 2019-10-10 RX ADMIN — METOPROLOL TARTRATE 12.5 MG: 25 TABLET ORAL at 08:29

## 2019-10-10 RX ADMIN — Medication 10 ML: at 08:30

## 2019-10-10 RX ADMIN — IPRATROPIUM BROMIDE AND ALBUTEROL SULFATE 1 AMPULE: .5; 3 SOLUTION RESPIRATORY (INHALATION) at 12:56

## 2019-10-10 RX ADMIN — PANTOPRAZOLE SODIUM 40 MG: 40 INJECTION, POWDER, FOR SOLUTION INTRAVENOUS at 08:30

## 2019-10-10 RX ADMIN — METHYLPREDNISOLONE SODIUM SUCCINATE 40 MG: 40 INJECTION, POWDER, FOR SOLUTION INTRAMUSCULAR; INTRAVENOUS at 14:45

## 2019-10-10 RX ADMIN — DONEPEZIL HYDROCHLORIDE 10 MG: 10 TABLET, FILM COATED ORAL at 20:43

## 2019-10-10 RX ADMIN — MAGNESIUM SULFATE HEPTAHYDRATE 2 G: 40 INJECTION, SOLUTION INTRAVENOUS at 14:46

## 2019-10-10 RX ADMIN — ASPIRIN 81 MG: 81 TABLET, COATED ORAL at 08:29

## 2019-10-10 RX ADMIN — INSULIN LISPRO 1 UNITS: 100 INJECTION, SOLUTION INTRAVENOUS; SUBCUTANEOUS at 08:43

## 2019-10-10 RX ADMIN — ATORVASTATIN CALCIUM 20 MG: 20 TABLET, FILM COATED ORAL at 20:43

## 2019-10-10 RX ADMIN — Medication: at 01:15

## 2019-10-10 RX ADMIN — CEFTRIAXONE SODIUM 2 G: 2 INJECTION, POWDER, FOR SOLUTION INTRAMUSCULAR; INTRAVENOUS at 21:20

## 2019-10-10 RX ADMIN — ENOXAPARIN SODIUM 30 MG: 30 INJECTION SUBCUTANEOUS at 08:30

## 2019-10-10 RX ADMIN — Medication 10 ML: at 21:20

## 2019-10-10 RX ADMIN — SODIUM CHLORIDE: 9 INJECTION, SOLUTION INTRAVENOUS at 01:21

## 2019-10-10 RX ADMIN — INSULIN LISPRO 2 UNITS: 100 INJECTION, SOLUTION INTRAVENOUS; SUBCUTANEOUS at 21:20

## 2019-10-10 RX ADMIN — Medication 10 ML: at 20:44

## 2019-10-10 RX ADMIN — SERTRALINE HYDROCHLORIDE 50 MG: 50 TABLET ORAL at 08:29

## 2019-10-10 RX ADMIN — INSULIN LISPRO 1 UNITS: 100 INJECTION, SOLUTION INTRAVENOUS; SUBCUTANEOUS at 01:18

## 2019-10-10 RX ADMIN — Medication 3 MG: at 21:27

## 2019-10-10 RX ADMIN — METOPROLOL TARTRATE 12.5 MG: 25 TABLET ORAL at 20:43

## 2019-10-10 RX ADMIN — DONEPEZIL HYDROCHLORIDE 10 MG: 10 TABLET, FILM COATED ORAL at 01:15

## 2019-10-10 RX ADMIN — AZITHROMYCIN MONOHYDRATE 250 MG: 500 INJECTION, POWDER, LYOPHILIZED, FOR SOLUTION INTRAVENOUS at 22:02

## 2019-10-10 RX ADMIN — MAGNESIUM SULFATE HEPTAHYDRATE 1 G: 1 INJECTION, SOLUTION INTRAVENOUS at 06:01

## 2019-10-10 RX ADMIN — Medication 10 ML: at 22:02

## 2019-10-10 RX ADMIN — HYDROCODONE BITARTRATE AND ACETAMINOPHEN 1 TABLET: 5; 325 TABLET ORAL at 23:38

## 2019-10-10 RX ADMIN — ISOSORBIDE MONONITRATE 30 MG: 30 TABLET, EXTENDED RELEASE ORAL at 08:29

## 2019-10-10 RX ADMIN — CLOPIDOGREL BISULFATE 75 MG: 75 TABLET ORAL at 08:30

## 2019-10-10 RX ADMIN — METHYLPREDNISOLONE SODIUM SUCCINATE 40 MG: 40 INJECTION, POWDER, FOR SOLUTION INTRAMUSCULAR; INTRAVENOUS at 22:02

## 2019-10-10 RX ADMIN — LEVOTHYROXINE SODIUM 50 MCG: 50 TABLET ORAL at 06:50

## 2019-10-10 RX ADMIN — IPRATROPIUM BROMIDE AND ALBUTEROL SULFATE 1 AMPULE: .5; 3 SOLUTION RESPIRATORY (INHALATION) at 16:43

## 2019-10-10 ASSESSMENT — PAIN SCALES - GENERAL
PAINLEVEL_OUTOF10: 4
PAINLEVEL_OUTOF10: 0
PAINLEVEL_OUTOF10: 0

## 2019-10-11 LAB
CULTURE: NORMAL
GLUCOSE BLD-MCNC: 173 MG/DL (ref 70–99)
GLUCOSE BLD-MCNC: 218 MG/DL (ref 70–99)
GLUCOSE BLD-MCNC: 227 MG/DL (ref 70–99)
GLUCOSE BLD-MCNC: 230 MG/DL (ref 70–99)
GLUCOSE BLD-MCNC: 237 MG/DL (ref 70–99)
Lab: NORMAL
SPECIMEN: NORMAL

## 2019-10-11 PROCEDURE — 87449 NOS EACH ORGANISM AG IA: CPT

## 2019-10-11 PROCEDURE — 87899 AGENT NOS ASSAY W/OPTIC: CPT

## 2019-10-11 PROCEDURE — 6370000000 HC RX 637 (ALT 250 FOR IP): Performed by: INTERNAL MEDICINE

## 2019-10-11 PROCEDURE — 6370000000 HC RX 637 (ALT 250 FOR IP): Performed by: NURSE PRACTITIONER

## 2019-10-11 PROCEDURE — 1200000000 HC SEMI PRIVATE

## 2019-10-11 PROCEDURE — 94761 N-INVAS EAR/PLS OXIMETRY MLT: CPT

## 2019-10-11 PROCEDURE — 6360000002 HC RX W HCPCS: Performed by: NURSE PRACTITIONER

## 2019-10-11 PROCEDURE — 2580000003 HC RX 258: Performed by: NURSE PRACTITIONER

## 2019-10-11 PROCEDURE — 82962 GLUCOSE BLOOD TEST: CPT

## 2019-10-11 PROCEDURE — 94640 AIRWAY INHALATION TREATMENT: CPT

## 2019-10-11 PROCEDURE — C9113 INJ PANTOPRAZOLE SODIUM, VIA: HCPCS | Performed by: NURSE PRACTITIONER

## 2019-10-11 RX ORDER — PREDNISONE 20 MG/1
20 TABLET ORAL DAILY
Status: DISCONTINUED | OUTPATIENT
Start: 2019-10-11 | End: 2019-10-12 | Stop reason: HOSPADM

## 2019-10-11 RX ORDER — IPRATROPIUM BROMIDE AND ALBUTEROL SULFATE 2.5; .5 MG/3ML; MG/3ML
1 SOLUTION RESPIRATORY (INHALATION) 4 TIMES DAILY
COMMUNITY
End: 2020-10-06

## 2019-10-11 RX ADMIN — IPRATROPIUM BROMIDE AND ALBUTEROL SULFATE 1 AMPULE: .5; 3 SOLUTION RESPIRATORY (INHALATION) at 16:13

## 2019-10-11 RX ADMIN — ENOXAPARIN SODIUM 30 MG: 30 INJECTION SUBCUTANEOUS at 08:51

## 2019-10-11 RX ADMIN — DONEPEZIL HYDROCHLORIDE 10 MG: 10 TABLET, FILM COATED ORAL at 20:14

## 2019-10-11 RX ADMIN — IPRATROPIUM BROMIDE AND ALBUTEROL SULFATE 1 AMPULE: .5; 3 SOLUTION RESPIRATORY (INHALATION) at 12:09

## 2019-10-11 RX ADMIN — PANTOPRAZOLE SODIUM 40 MG: 40 INJECTION, POWDER, FOR SOLUTION INTRAVENOUS at 10:50

## 2019-10-11 RX ADMIN — Medication 10 ML: at 10:54

## 2019-10-11 RX ADMIN — Medication 10 ML: at 20:15

## 2019-10-11 RX ADMIN — IPRATROPIUM BROMIDE AND ALBUTEROL SULFATE 1 AMPULE: .5; 3 SOLUTION RESPIRATORY (INHALATION) at 19:39

## 2019-10-11 RX ADMIN — CLOPIDOGREL BISULFATE 75 MG: 75 TABLET ORAL at 08:51

## 2019-10-11 RX ADMIN — ATORVASTATIN CALCIUM 20 MG: 20 TABLET, FILM COATED ORAL at 20:14

## 2019-10-11 RX ADMIN — METOPROLOL TARTRATE 12.5 MG: 25 TABLET ORAL at 20:14

## 2019-10-11 RX ADMIN — PREDNISONE 20 MG: 20 TABLET ORAL at 08:51

## 2019-10-11 RX ADMIN — SERTRALINE HYDROCHLORIDE 50 MG: 50 TABLET ORAL at 08:51

## 2019-10-11 RX ADMIN — LEVOTHYROXINE SODIUM 50 MCG: 50 TABLET ORAL at 06:48

## 2019-10-11 RX ADMIN — INSULIN LISPRO 2 UNITS: 100 INJECTION, SOLUTION INTRAVENOUS; SUBCUTANEOUS at 11:55

## 2019-10-11 RX ADMIN — ASPIRIN 81 MG: 81 TABLET, COATED ORAL at 08:51

## 2019-10-11 RX ADMIN — ISOSORBIDE MONONITRATE 30 MG: 30 TABLET, EXTENDED RELEASE ORAL at 08:51

## 2019-10-11 RX ADMIN — AZITHROMYCIN MONOHYDRATE 250 MG: 500 INJECTION, POWDER, LYOPHILIZED, FOR SOLUTION INTRAVENOUS at 23:22

## 2019-10-11 RX ADMIN — INSULIN LISPRO 1 UNITS: 100 INJECTION, SOLUTION INTRAVENOUS; SUBCUTANEOUS at 20:18

## 2019-10-11 RX ADMIN — CEFTRIAXONE SODIUM 2 G: 2 INJECTION, POWDER, FOR SOLUTION INTRAMUSCULAR; INTRAVENOUS at 22:21

## 2019-10-11 RX ADMIN — IPRATROPIUM BROMIDE AND ALBUTEROL SULFATE 1 AMPULE: .5; 3 SOLUTION RESPIRATORY (INHALATION) at 08:12

## 2019-10-11 RX ADMIN — INSULIN LISPRO 2 UNITS: 100 INJECTION, SOLUTION INTRAVENOUS; SUBCUTANEOUS at 17:06

## 2019-10-11 ASSESSMENT — PAIN DESCRIPTION - PAIN TYPE: TYPE: CHRONIC PAIN

## 2019-10-11 ASSESSMENT — PAIN DESCRIPTION - LOCATION: LOCATION: BACK

## 2019-10-11 ASSESSMENT — PAIN SCALES - GENERAL
PAINLEVEL_OUTOF10: 0

## 2019-10-11 ASSESSMENT — PAIN DESCRIPTION - FREQUENCY: FREQUENCY: CONTINUOUS

## 2019-10-11 ASSESSMENT — PAIN DESCRIPTION - ORIENTATION: ORIENTATION: LOWER

## 2019-10-12 VITALS
WEIGHT: 196.5 LBS | HEIGHT: 72 IN | BODY MASS INDEX: 26.61 KG/M2 | RESPIRATION RATE: 15 BRPM | SYSTOLIC BLOOD PRESSURE: 129 MMHG | HEART RATE: 60 BPM | OXYGEN SATURATION: 95 % | TEMPERATURE: 97.7 F | DIASTOLIC BLOOD PRESSURE: 59 MMHG

## 2019-10-12 LAB
CULTURE: ABNORMAL
CULTURE: ABNORMAL
GLUCOSE BLD-MCNC: 122 MG/DL (ref 70–99)
GLUCOSE BLD-MCNC: 124 MG/DL (ref 70–99)
LEGIONELLA URINARY AG: NEGATIVE
Lab: ABNORMAL
SPECIMEN: ABNORMAL
STREP PNEUMONIAE ANTIGEN: NORMAL
TOTAL COLONY COUNT: ABNORMAL

## 2019-10-12 PROCEDURE — 6370000000 HC RX 637 (ALT 250 FOR IP): Performed by: NURSE PRACTITIONER

## 2019-10-12 PROCEDURE — 6370000000 HC RX 637 (ALT 250 FOR IP): Performed by: INTERNAL MEDICINE

## 2019-10-12 PROCEDURE — 82962 GLUCOSE BLOOD TEST: CPT

## 2019-10-12 PROCEDURE — C9113 INJ PANTOPRAZOLE SODIUM, VIA: HCPCS | Performed by: NURSE PRACTITIONER

## 2019-10-12 PROCEDURE — 94640 AIRWAY INHALATION TREATMENT: CPT

## 2019-10-12 PROCEDURE — 2580000003 HC RX 258: Performed by: NURSE PRACTITIONER

## 2019-10-12 PROCEDURE — 94761 N-INVAS EAR/PLS OXIMETRY MLT: CPT

## 2019-10-12 PROCEDURE — 6360000002 HC RX W HCPCS: Performed by: NURSE PRACTITIONER

## 2019-10-12 RX ORDER — LEVOFLOXACIN 500 MG/1
500 TABLET, FILM COATED ORAL DAILY
Qty: 5 TABLET | Refills: 0 | Status: SHIPPED | OUTPATIENT
Start: 2019-10-12 | End: 2019-10-17

## 2019-10-12 RX ORDER — PREDNISONE 20 MG/1
40 TABLET ORAL DAILY
Qty: 10 TABLET | Refills: 0 | Status: SHIPPED | OUTPATIENT
Start: 2019-10-13 | End: 2019-10-18

## 2019-10-12 RX ADMIN — METOPROLOL TARTRATE 12.5 MG: 25 TABLET ORAL at 09:25

## 2019-10-12 RX ADMIN — Medication 10 ML: at 09:24

## 2019-10-12 RX ADMIN — LEVOTHYROXINE SODIUM 50 MCG: 50 TABLET ORAL at 06:47

## 2019-10-12 RX ADMIN — ISOSORBIDE MONONITRATE 30 MG: 30 TABLET, EXTENDED RELEASE ORAL at 09:25

## 2019-10-12 RX ADMIN — PREDNISONE 20 MG: 20 TABLET ORAL at 09:25

## 2019-10-12 RX ADMIN — CLOPIDOGREL BISULFATE 75 MG: 75 TABLET ORAL at 09:25

## 2019-10-12 RX ADMIN — PANTOPRAZOLE SODIUM 40 MG: 40 INJECTION, POWDER, FOR SOLUTION INTRAVENOUS at 09:24

## 2019-10-12 RX ADMIN — ASPIRIN 81 MG: 81 TABLET, COATED ORAL at 09:25

## 2019-10-12 RX ADMIN — IPRATROPIUM BROMIDE AND ALBUTEROL SULFATE 1 AMPULE: .5; 3 SOLUTION RESPIRATORY (INHALATION) at 08:29

## 2019-10-12 RX ADMIN — SERTRALINE HYDROCHLORIDE 50 MG: 50 TABLET ORAL at 09:24

## 2019-10-12 RX ADMIN — IPRATROPIUM BROMIDE AND ALBUTEROL SULFATE 1 AMPULE: .5; 3 SOLUTION RESPIRATORY (INHALATION) at 12:13

## 2019-10-12 ASSESSMENT — PAIN SCALES - GENERAL
PAINLEVEL_OUTOF10: 0
PAINLEVEL_OUTOF10: 0

## 2019-10-13 LAB
CULTURE: ABNORMAL
GRAM SMEAR: ABNORMAL
Lab: ABNORMAL
SPECIMEN: ABNORMAL

## 2019-10-14 LAB
CULTURE: NORMAL
CULTURE: NORMAL
Lab: NORMAL
Lab: NORMAL
SPECIMEN: NORMAL
SPECIMEN: NORMAL

## 2019-10-15 ENCOUNTER — HOSPITAL ENCOUNTER (OUTPATIENT)
Dept: CARDIAC REHAB | Age: 79
Setting detail: THERAPIES SERIES
Discharge: HOME | End: 2019-10-15
Payer: MEDICARE

## 2019-10-15 PROCEDURE — G0424 PULMONARY REHAB W EXER: HCPCS

## 2019-10-21 ENCOUNTER — HOSPITAL ENCOUNTER (OUTPATIENT)
Dept: CARDIAC REHAB | Age: 79
Setting detail: THERAPIES SERIES
Discharge: HOME | End: 2019-10-21
Payer: MEDICARE

## 2019-10-21 PROCEDURE — G0424 PULMONARY REHAB W EXER: HCPCS

## 2019-10-22 ENCOUNTER — HOSPITAL ENCOUNTER (OUTPATIENT)
Dept: CARDIAC REHAB | Age: 79
Setting detail: THERAPIES SERIES
Discharge: HOME | End: 2019-10-22
Payer: MEDICARE

## 2019-10-22 PROCEDURE — G0424 PULMONARY REHAB W EXER: HCPCS

## 2019-10-28 ENCOUNTER — HOSPITAL ENCOUNTER (OUTPATIENT)
Dept: CARDIAC REHAB | Age: 79
Setting detail: THERAPIES SERIES
Discharge: HOME | End: 2019-10-28
Payer: MEDICARE

## 2019-10-28 PROCEDURE — G0424 PULMONARY REHAB W EXER: HCPCS

## 2019-10-31 ENCOUNTER — HOSPITAL ENCOUNTER (OUTPATIENT)
Dept: CARDIAC REHAB | Age: 79
Setting detail: THERAPIES SERIES
Discharge: HOME | End: 2019-10-31
Payer: MEDICARE

## 2019-10-31 PROCEDURE — G0424 PULMONARY REHAB W EXER: HCPCS

## 2019-11-04 ENCOUNTER — HOSPITAL ENCOUNTER (OUTPATIENT)
Dept: CARDIAC REHAB | Age: 79
Setting detail: THERAPIES SERIES
Discharge: HOME | End: 2019-11-04
Payer: MEDICARE

## 2019-11-04 PROCEDURE — G0424 PULMONARY REHAB W EXER: HCPCS

## 2019-11-05 ENCOUNTER — APPOINTMENT (OUTPATIENT)
Dept: CARDIAC REHAB | Age: 79
End: 2019-11-05
Payer: MEDICARE

## 2019-11-06 ENCOUNTER — OFFICE VISIT (OUTPATIENT)
Dept: CARDIOLOGY CLINIC | Age: 79
End: 2019-11-06
Payer: MEDICARE

## 2019-11-06 VITALS
DIASTOLIC BLOOD PRESSURE: 70 MMHG | HEART RATE: 50 BPM | SYSTOLIC BLOOD PRESSURE: 118 MMHG | HEIGHT: 72 IN | BODY MASS INDEX: 26.87 KG/M2 | WEIGHT: 198.4 LBS

## 2019-11-06 DIAGNOSIS — R91.8 ENDOBRONCHIAL MASS: ICD-10-CM

## 2019-11-06 DIAGNOSIS — I95.1 ORTHOSTATIC HYPOTENSION: ICD-10-CM

## 2019-11-06 DIAGNOSIS — R07.9 CHEST PAIN, UNSPECIFIED TYPE: ICD-10-CM

## 2019-11-06 DIAGNOSIS — I25.110 CORONARY ARTERY DISEASE INVOLVING NATIVE CORONARY ARTERY OF NATIVE HEART WITH UNSTABLE ANGINA PECTORIS (HCC): ICD-10-CM

## 2019-11-06 DIAGNOSIS — I25.10 ASCVD (ARTERIOSCLEROTIC CARDIOVASCULAR DISEASE): Primary | ICD-10-CM

## 2019-11-06 PROCEDURE — 99214 OFFICE O/P EST MOD 30 MIN: CPT | Performed by: INTERNAL MEDICINE

## 2019-11-06 PROCEDURE — G8482 FLU IMMUNIZE ORDER/ADMIN: HCPCS | Performed by: INTERNAL MEDICINE

## 2019-11-06 PROCEDURE — 1036F TOBACCO NON-USER: CPT | Performed by: INTERNAL MEDICINE

## 2019-11-06 PROCEDURE — 1111F DSCHRG MED/CURRENT MED MERGE: CPT | Performed by: INTERNAL MEDICINE

## 2019-11-06 PROCEDURE — 4040F PNEUMOC VAC/ADMIN/RCVD: CPT | Performed by: INTERNAL MEDICINE

## 2019-11-06 PROCEDURE — G8417 CALC BMI ABV UP PARAM F/U: HCPCS | Performed by: INTERNAL MEDICINE

## 2019-11-06 PROCEDURE — 1123F ACP DISCUSS/DSCN MKR DOCD: CPT | Performed by: INTERNAL MEDICINE

## 2019-11-06 PROCEDURE — G8598 ASA/ANTIPLAT THER USED: HCPCS | Performed by: INTERNAL MEDICINE

## 2019-11-06 PROCEDURE — G8427 DOCREV CUR MEDS BY ELIG CLIN: HCPCS | Performed by: INTERNAL MEDICINE

## 2019-11-07 ENCOUNTER — HOSPITAL ENCOUNTER (OUTPATIENT)
Dept: CARDIAC REHAB | Age: 79
Setting detail: THERAPIES SERIES
Discharge: HOME | End: 2019-11-07
Payer: MEDICARE

## 2019-11-07 PROCEDURE — G0424 PULMONARY REHAB W EXER: HCPCS

## 2019-11-11 ENCOUNTER — HOSPITAL ENCOUNTER (OUTPATIENT)
Dept: CARDIAC REHAB | Age: 79
Setting detail: THERAPIES SERIES
Discharge: HOME | End: 2019-11-11
Payer: MEDICARE

## 2019-11-11 PROCEDURE — G0424 PULMONARY REHAB W EXER: HCPCS

## 2019-11-18 ENCOUNTER — HOSPITAL ENCOUNTER (OUTPATIENT)
Dept: CARDIAC REHAB | Age: 79
Setting detail: THERAPIES SERIES
Discharge: HOME | End: 2019-11-18
Payer: MEDICARE

## 2019-11-18 PROCEDURE — G0424 PULMONARY REHAB W EXER: HCPCS

## 2019-11-21 ENCOUNTER — HOSPITAL ENCOUNTER (OUTPATIENT)
Dept: CARDIAC REHAB | Age: 79
Setting detail: THERAPIES SERIES
Discharge: HOME | End: 2019-11-21
Payer: MEDICARE

## 2019-11-21 PROCEDURE — 93798 PHYS/QHP OP CAR RHAB W/ECG: CPT

## 2019-11-21 PROCEDURE — G0424 PULMONARY REHAB W EXER: HCPCS

## 2019-11-25 ENCOUNTER — HOSPITAL ENCOUNTER (OUTPATIENT)
Dept: CARDIAC REHAB | Age: 79
Setting detail: THERAPIES SERIES
Discharge: HOME | End: 2019-11-25
Payer: MEDICARE

## 2019-11-25 PROCEDURE — G0424 PULMONARY REHAB W EXER: HCPCS

## 2019-12-02 ENCOUNTER — HOSPITAL ENCOUNTER (OUTPATIENT)
Dept: CARDIAC REHAB | Age: 79
Setting detail: THERAPIES SERIES
Discharge: HOME | End: 2019-12-02
Payer: MEDICARE

## 2019-12-02 PROCEDURE — G0424 PULMONARY REHAB W EXER: HCPCS

## 2019-12-03 ENCOUNTER — HOSPITAL ENCOUNTER (OUTPATIENT)
Dept: CARDIAC REHAB | Age: 79
Setting detail: THERAPIES SERIES
Discharge: HOME | End: 2019-12-03
Payer: MEDICARE

## 2019-12-03 PROCEDURE — G0424 PULMONARY REHAB W EXER: HCPCS

## 2019-12-05 ENCOUNTER — HOSPITAL ENCOUNTER (OUTPATIENT)
Dept: CARDIAC REHAB | Age: 79
Setting detail: THERAPIES SERIES
Discharge: HOME | End: 2019-12-05
Payer: MEDICARE

## 2019-12-05 PROCEDURE — G0424 PULMONARY REHAB W EXER: HCPCS

## 2019-12-09 ENCOUNTER — HOSPITAL ENCOUNTER (OUTPATIENT)
Dept: CARDIAC REHAB | Age: 79
Setting detail: THERAPIES SERIES
Discharge: HOME | End: 2019-12-09
Payer: MEDICARE

## 2019-12-09 PROCEDURE — G0424 PULMONARY REHAB W EXER: HCPCS

## 2019-12-19 ENCOUNTER — HOSPITAL ENCOUNTER (OUTPATIENT)
Dept: CARDIAC REHAB | Age: 79
Setting detail: THERAPIES SERIES
Discharge: HOME | End: 2019-12-19
Payer: MEDICARE

## 2019-12-19 PROCEDURE — G0424 PULMONARY REHAB W EXER: HCPCS

## 2020-05-31 ENCOUNTER — APPOINTMENT (OUTPATIENT)
Dept: CT IMAGING | Age: 80
End: 2020-05-31
Payer: MEDICARE

## 2020-05-31 ENCOUNTER — HOSPITAL ENCOUNTER (EMERGENCY)
Age: 80
Discharge: HOME | End: 2020-05-31
Payer: MEDICARE

## 2020-05-31 VITALS
DIASTOLIC BLOOD PRESSURE: 75 MMHG | WEIGHT: 200 LBS | BODY MASS INDEX: 27.09 KG/M2 | HEART RATE: 59 BPM | OXYGEN SATURATION: 100 % | RESPIRATION RATE: 18 BRPM | SYSTOLIC BLOOD PRESSURE: 162 MMHG | TEMPERATURE: 98.7 F | HEIGHT: 72 IN

## 2020-05-31 LAB
ALBUMIN SERPL-MCNC: 3.8 GM/DL (ref 3.4–5)
ALP BLD-CCNC: 55 IU/L (ref 40–129)
ALT SERPL-CCNC: 14 U/L (ref 10–40)
ANION GAP SERPL CALCULATED.3IONS-SCNC: 10 MMOL/L (ref 4–16)
AST SERPL-CCNC: 19 IU/L (ref 15–37)
BACTERIA: NEGATIVE /HPF
BASOPHILS ABSOLUTE: 0.1 K/CU MM
BASOPHILS RELATIVE PERCENT: 1.2 % (ref 0–1)
BILIRUB SERPL-MCNC: 0.6 MG/DL (ref 0–1)
BILIRUBIN URINE: NEGATIVE MG/DL
BLOOD, URINE: ABNORMAL
BUN BLDV-MCNC: 18 MG/DL (ref 6–23)
CALCIUM SERPL-MCNC: 9 MG/DL (ref 8.3–10.6)
CHLORIDE BLD-SCNC: 102 MMOL/L (ref 99–110)
CLARITY: CLEAR
CO2: 25 MMOL/L (ref 21–32)
COLOR: ABNORMAL
CREAT SERPL-MCNC: 1.2 MG/DL (ref 0.9–1.3)
DIFFERENTIAL TYPE: ABNORMAL
EOSINOPHILS ABSOLUTE: 0.3 K/CU MM
EOSINOPHILS RELATIVE PERCENT: 4.2 % (ref 0–3)
GFR AFRICAN AMERICAN: >60 ML/MIN/1.73M2
GFR NON-AFRICAN AMERICAN: 59 ML/MIN/1.73M2
GLUCOSE BLD-MCNC: 192 MG/DL (ref 70–99)
GLUCOSE, URINE: NEGATIVE MG/DL
HCT VFR BLD CALC: 40.8 % (ref 42–52)
HEMOGLOBIN: 12.8 GM/DL (ref 13.5–18)
IMMATURE NEUTROPHIL %: 0.5 % (ref 0–0.43)
KETONES, URINE: NEGATIVE MG/DL
LACTATE: 1.7 MMOL/L (ref 0.4–2)
LEUKOCYTE ESTERASE, URINE: NEGATIVE
LIPASE: 100 IU/L (ref 13–60)
LYMPHOCYTES ABSOLUTE: 2.6 K/CU MM
LYMPHOCYTES RELATIVE PERCENT: 32.1 % (ref 24–44)
MCH RBC QN AUTO: 30.9 PG (ref 27–31)
MCHC RBC AUTO-ENTMCNC: 31.4 % (ref 32–36)
MCV RBC AUTO: 98.6 FL (ref 78–100)
MONOCYTES ABSOLUTE: 0.6 K/CU MM
MONOCYTES RELATIVE PERCENT: 7 % (ref 0–4)
MUCUS: ABNORMAL HPF
NITRITE URINE, QUANTITATIVE: NEGATIVE
NUCLEATED RBC %: 0 %
PDW BLD-RTO: 14.4 % (ref 11.7–14.9)
PH, URINE: 5 (ref 5–8)
PLATELET # BLD: 153 K/CU MM (ref 140–440)
PMV BLD AUTO: 11 FL (ref 7.5–11.1)
POTASSIUM SERPL-SCNC: 3.9 MMOL/L (ref 3.5–5.1)
PROTEIN UA: NEGATIVE MG/DL
RBC # BLD: 4.14 M/CU MM (ref 4.6–6.2)
RBC URINE: 487 /HPF (ref 0–3)
SEGMENTED NEUTROPHILS ABSOLUTE COUNT: 4.5 K/CU MM
SEGMENTED NEUTROPHILS RELATIVE PERCENT: 55 % (ref 36–66)
SODIUM BLD-SCNC: 137 MMOL/L (ref 135–145)
SPECIFIC GRAVITY UA: 1.02 (ref 1–1.03)
TOTAL IMMATURE NEUTOROPHIL: 0.04 K/CU MM
TOTAL NUCLEATED RBC: 0 K/CU MM
TOTAL PROTEIN: 6.9 GM/DL (ref 6.4–8.2)
TRICHOMONAS: ABNORMAL /HPF
UROBILINOGEN, URINE: NORMAL MG/DL (ref 0.2–1)
WBC # BLD: 8.1 K/CU MM (ref 4–10.5)
WBC UA: <1 /HPF (ref 0–2)

## 2020-05-31 PROCEDURE — 96375 TX/PRO/DX INJ NEW DRUG ADDON: CPT

## 2020-05-31 PROCEDURE — 83605 ASSAY OF LACTIC ACID: CPT

## 2020-05-31 PROCEDURE — 6360000004 HC RX CONTRAST MEDICATION: Performed by: PHYSICIAN ASSISTANT

## 2020-05-31 PROCEDURE — 80053 COMPREHEN METABOLIC PANEL: CPT

## 2020-05-31 PROCEDURE — 2580000003 HC RX 258: Performed by: PHYSICIAN ASSISTANT

## 2020-05-31 PROCEDURE — 87086 URINE CULTURE/COLONY COUNT: CPT

## 2020-05-31 PROCEDURE — 96361 HYDRATE IV INFUSION ADD-ON: CPT

## 2020-05-31 PROCEDURE — 36415 COLL VENOUS BLD VENIPUNCTURE: CPT

## 2020-05-31 PROCEDURE — 96376 TX/PRO/DX INJ SAME DRUG ADON: CPT

## 2020-05-31 PROCEDURE — 96374 THER/PROPH/DIAG INJ IV PUSH: CPT

## 2020-05-31 PROCEDURE — 99284 EMERGENCY DEPT VISIT MOD MDM: CPT

## 2020-05-31 PROCEDURE — 4500000027

## 2020-05-31 PROCEDURE — 74177 CT ABD & PELVIS W/CONTRAST: CPT

## 2020-05-31 PROCEDURE — 83690 ASSAY OF LIPASE: CPT

## 2020-05-31 PROCEDURE — 6360000002 HC RX W HCPCS: Performed by: PHYSICIAN ASSISTANT

## 2020-05-31 PROCEDURE — 85025 COMPLETE CBC W/AUTO DIFF WBC: CPT

## 2020-05-31 PROCEDURE — 81001 URINALYSIS AUTO W/SCOPE: CPT

## 2020-05-31 RX ORDER — MORPHINE SULFATE 4 MG/ML
4 INJECTION, SOLUTION INTRAMUSCULAR; INTRAVENOUS EVERY 30 MIN PRN
Status: DISCONTINUED | OUTPATIENT
Start: 2020-05-31 | End: 2020-05-31 | Stop reason: HOSPADM

## 2020-05-31 RX ORDER — ONDANSETRON 2 MG/ML
4 INJECTION INTRAMUSCULAR; INTRAVENOUS EVERY 30 MIN PRN
Status: DISCONTINUED | OUTPATIENT
Start: 2020-05-31 | End: 2020-05-31 | Stop reason: HOSPADM

## 2020-05-31 RX ORDER — 0.9 % SODIUM CHLORIDE 0.9 %
500 INTRAVENOUS SOLUTION INTRAVENOUS ONCE
Status: COMPLETED | OUTPATIENT
Start: 2020-05-31 | End: 2020-05-31

## 2020-05-31 RX ADMIN — SODIUM CHLORIDE 500 ML: 9 INJECTION, SOLUTION INTRAVENOUS at 15:49

## 2020-05-31 RX ADMIN — MORPHINE SULFATE 4 MG: 4 INJECTION, SOLUTION INTRAMUSCULAR; INTRAVENOUS at 15:46

## 2020-05-31 RX ADMIN — IOPAMIDOL 75 ML: 755 INJECTION, SOLUTION INTRAVENOUS at 15:19

## 2020-05-31 RX ADMIN — MORPHINE SULFATE 4 MG: 4 INJECTION, SOLUTION INTRAMUSCULAR; INTRAVENOUS at 14:36

## 2020-05-31 RX ADMIN — ONDANSETRON 4 MG: 2 INJECTION INTRAMUSCULAR; INTRAVENOUS at 14:35

## 2020-05-31 ASSESSMENT — PAIN DESCRIPTION - LOCATION: LOCATION: ABDOMEN

## 2020-05-31 ASSESSMENT — PAIN SCALES - GENERAL
PAINLEVEL_OUTOF10: 10
PAINLEVEL_OUTOF10: 7
PAINLEVEL_OUTOF10: 9

## 2020-05-31 ASSESSMENT — PAIN DESCRIPTION - ORIENTATION: ORIENTATION: LOWER;MID

## 2020-05-31 ASSESSMENT — PAIN DESCRIPTION - PAIN TYPE: TYPE: ACUTE PAIN

## 2020-05-31 NOTE — ED PROVIDER NOTES
59, temperature 98.7 °F (37.1 °C), temperature source Oral, resp. rate 18, height 6' (1.829 m), weight 200 lb (90.7 kg), SpO2 100 %. Disposition:  Discharge to home in stable condition. Patient was given scripts for the following medications. I counseled patient how to take these medications. Discharge Medication List as of 5/31/2020  6:16 PM          This chart was generated using the [address] [place] [address] dictation system. I created this record but it may contain dictation errors given the limitations of this technology.        [facility]  05/31/20 4260

## 2020-06-01 ENCOUNTER — TELEPHONE (OUTPATIENT)
Dept: OTHER | Facility: CLINIC | Age: 80
End: 2020-06-01

## 2020-06-02 LAB
CULTURE: NORMAL
Lab: NORMAL
SPECIMEN: NORMAL

## 2020-07-27 ENCOUNTER — VIRTUAL VISIT (OUTPATIENT)
Dept: CARDIOLOGY CLINIC | Age: 80
End: 2020-07-27
Payer: MEDICARE

## 2020-07-27 PROCEDURE — 99442 PR PHYS/QHP TELEPHONE EVALUATION 11-20 MIN: CPT | Performed by: NURSE PRACTITIONER

## 2020-07-27 PROCEDURE — 4040F PNEUMOC VAC/ADMIN/RCVD: CPT | Performed by: NURSE PRACTITIONER

## 2020-07-27 PROCEDURE — G8427 DOCREV CUR MEDS BY ELIG CLIN: HCPCS | Performed by: NURSE PRACTITIONER

## 2020-07-27 PROCEDURE — 1123F ACP DISCUSS/DSCN MKR DOCD: CPT | Performed by: NURSE PRACTITIONER

## 2020-07-27 NOTE — PROGRESS NOTES
Patient states he has too many to remember what medications he is taking, He just told me that everything is the same.
next 24 hours. The call was not tied to face to face office visit or procedure that has occurred in in prior 7 days.   Based on our conversation /evaluation , a subsequent office visit for the patient's problem is not indicated for  24 hrs     Total Time: minutes: 11-20 minutes    Note: not billable if this call serves to triage the patient into an appointment for the relevant concern      Meghann Louise

## 2020-07-27 NOTE — LETTER
Kellen Curran  1940  [id number]    Have you had any Chest Pain - No      Have you had any Shortness of Breath - Yes  If Yes - When on exertion    Have you had any dizziness - No      Have you had any palpitations - No      Is the patient on any of the following medications - no  If Yes DO EKG    Do you have any edema - no    Do you have a surgery or procedure scheduled in the near future - No      Ask patient if they want to sign up for [facility] if they are not already signed up    Check to see if we have an E-MAIL on file for the patient    Check medication list thoroughly!!!  BE SURE TO ASK PATIENT IF THEY NEED MEDICATION REFILLS

## 2020-10-06 ENCOUNTER — OFFICE VISIT (OUTPATIENT)
Dept: CARDIOLOGY CLINIC | Age: 80
End: 2020-10-06
Payer: MEDICARE

## 2020-10-06 ENCOUNTER — NURSE ONLY (OUTPATIENT)
Dept: CARDIOLOGY CLINIC | Age: 80
End: 2020-10-06

## 2020-10-06 VITALS
WEIGHT: 202 LBS | SYSTOLIC BLOOD PRESSURE: 130 MMHG | DIASTOLIC BLOOD PRESSURE: 82 MMHG | HEIGHT: 72 IN | HEART RATE: 62 BPM | BODY MASS INDEX: 27.36 KG/M2

## 2020-10-06 PROCEDURE — 1123F ACP DISCUSS/DSCN MKR DOCD: CPT | Performed by: INTERNAL MEDICINE

## 2020-10-06 PROCEDURE — 4040F PNEUMOC VAC/ADMIN/RCVD: CPT | Performed by: INTERNAL MEDICINE

## 2020-10-06 PROCEDURE — 99214 OFFICE O/P EST MOD 30 MIN: CPT | Performed by: INTERNAL MEDICINE

## 2020-10-06 PROCEDURE — 1036F TOBACCO NON-USER: CPT | Performed by: INTERNAL MEDICINE

## 2020-10-06 PROCEDURE — G8427 DOCREV CUR MEDS BY ELIG CLIN: HCPCS | Performed by: INTERNAL MEDICINE

## 2020-10-06 PROCEDURE — 93000 ELECTROCARDIOGRAM COMPLETE: CPT | Performed by: INTERNAL MEDICINE

## 2020-10-06 PROCEDURE — G8417 CALC BMI ABV UP PARAM F/U: HCPCS | Performed by: INTERNAL MEDICINE

## 2020-10-06 PROCEDURE — G8484 FLU IMMUNIZE NO ADMIN: HCPCS | Performed by: INTERNAL MEDICINE

## 2020-10-06 RX ORDER — RANOLAZINE 500 MG/1
500 TABLET, EXTENDED RELEASE ORAL 2 TIMES DAILY
Qty: 60 TABLET | Refills: 3 | Status: ON HOLD | OUTPATIENT
Start: 2020-10-06 | End: 2020-11-06 | Stop reason: HOSPADM

## 2020-10-06 NOTE — PROGRESS NOTES
30 days e-cardio monitor placed.  # [id number]. Instructed patient on how to record the event and to call monitoring center at [phone] if any problems arise. Instructed patient to disconnect the lead wires from the electrodes before bathing or showering and reattach them afterwards. Instructed patient that the electrodes should be changed every 3 days or if they no longer adhere to the skin. Patient to mail package after the monitor has ended. Patient verbalized understanding.

## 2020-10-06 NOTE — PROGRESS NOTES
CARDIOLOGY  NOTE    Chief Complaint: Chest pain and shortness of breath    HPI:   Roxie Adamson is a 79y.o. year old who has history as noted below. Mr. Michele Castañeda is here with his wife he is extremely short of breath is on oxygen around-the-clock uresis oxygen saw pulmonology and had a CT scan done yesterday it appears that he has progressive lung disease and interstitial lung fibrosis with emphysema and pleural effusion. He status post lung resection. He does report some chest tightness when he tries to walk or do anything exertional related. He continues struggle with significant shortness of breath and episodes of tachycardia. His pulmonologist raised a valid question whether he is going into A. fib ? HE is on O2 ATC . When he was in cardiac rehab he would have frequent low blood pressures in the 80s although is not going to cardiac rehab anymore   He had LAD stent due to abnormal FFR in April 2019 .he is struggling with depression as well. CT chest shows left lung post resection changes and also loculated effusion . Aly Garcia He feels He is tired a lot. He cannot sleep. Aly Garcia He had lung surgery due to lung mass and Jan 2019.   He says that he has not felt good since then    Current Outpatient Medications   Medication Sig Dispense Refill    ranolazine (RANEXA) 500 MG extended release tablet Take 1 tablet by mouth 2 times daily 60 tablet 3    aspirin 81 MG EC tablet Take 1 tablet by mouth every morning Over The Counter 90 tablet 1    donepezil (ARICEPT) 10 MG tablet Take 1 tablet by mouth nightly 90 tablet 1    atorvastatin (LIPITOR) 20 MG tablet Take 1 tablet by mouth daily 90 tablet 1    pioglitazone (ACTOS) 45 MG tablet Take 1 tablet by mouth daily 90 tablet 1    sertraline (ZOLOFT) 50 MG tablet Take 1 tablet by mouth daily (Patient taking differently: Take 100 mg by mouth daily ) 90 tablet 1    SITagliptin (JANUVIA) 100 MG tablet Take 1 tablet by mouth daily 90 tablet 1  levothyroxine (SYNTHROID) 50 MCG tablet Take 1 tablet by mouth Daily 90 tablet 1    Cholecalciferol (VITAMIN D3) 5000 units TABS Take by mouth every morning Over The Counter       No current facility-administered medications for this visit. Allergies:   Norco [hydrocodone-acetaminophen]    Patient History:  Past Medical History:   Diagnosis Date    Arthritis     \"Back, Knees\"    Asthma     Back pain     \"At Times\"    CAD (coronary artery disease)     Diabetes mellitus ([address]     dx 5+ yrs ago-     Glaucoma     Bilateral Eyes    H/O 24 hour EKG monitoring 11-    Predominantly SR - avg rate of 69bpm. Lowest rate 46 bpm at 0622. Frequent isolated 5954 PVCs noted mostly in bigemenal pattern w/out complex arrhythmias. Five beat run of atrial tachycardia at 1228. No palpitations or dizziness reported in patient's daily activity report. (12-, )    H/O cardiac catheterization 06-    Noncritical diffuse CAD w/no critical stenosis. Diag borderline significant stenosis, not large enough fo percutaneous intervention. No significant angiographic progression of atherosclerosis since cath in 1997.   ( per Dr. Kunal Zaragoza at [facility]. Preserved vent function. MVP. CAD w/normal LM, 30-40% stenosis LAD, 70-80% stenosis of ostium & prox seg diag branch, normal left CX & RCA.)    H/O cardiovascular stress test 1/30/2013, 06- 1/30/2013-Normal study. Normal perfusion in all regions. EF 62%. 06--EF=60%. Normal. Normal pattern of perfusion. LV normal size. No wall abnormality. Exercise capacity good. (05-, 12-, 06-, , )    H/O chest pain     H/O chest x-ray 06-    Negative. Dx was dyspnea and CAD.  (02-)    H/O dizziness     H/O Doppler ultrasound 11-    (Carotid) Intimal thickening but no significant atherosclerotic plaque noted in right or left ICA. Doppler flow velocities w/in right and left ICA WNL.     H/O echocardiogram 1/30/2013, 05-    1/30/2013- LVSF normal. EF 50-55%. Impaired LV relaxation. Trace MR. Trace TR;   5- Normal LV size and systolic function. EF=60%. Chamber dimensions WNL. Mild concentric LV hypertrophy. Valves appear structurally normal.-OICC       H/O gastroesophageal reflux (GERD)     H/O pneumothorax Dx 1960    \"Both Sides\"    History of cardiac monitoring 12/03/2018    9 beat run of SVT, no other arrhythmias noted, primarily sinus rythym    History of complete ECG 06/06/2011    (06-, 07-, 06-, 06-)    History of exercise stress test 05/21/2019    Treadmill, Stopped due to fatigue and  Dyspnea.  History of stress test 11/07/2018    EF 61%, Normal    [place] (hard of hearing)     Bilateral Hearing Aids    Hx of CT scan of chest 11/12/2018    3 mm indeterminate solid nonobstructing endobronchial nodule of the proximal left upper lobe bronchus. Further evaluation with endoscopy is recommended. Bilateral basilar predominant intersitial changes suggestive of nonspecific interstitial pneumonitis.     Hx of Doppler echocardiogram 11/20/2018    EF55-60%,no valvular disease    Hyperlipidemia     Hypertension     Left Lung Cancer Dx 11-18    LEFT UPPER LOBECTOMY 1/28/19    Lung nodule     \"they say I have a spot on my lung- and mass in left lung so thats why doing the bronch\"( 12/7/2018)    Memory loss     MVP (mitral valve prolapse)     follows with Dr Martin Ritchie (myocardial infarction)     \"had heart attack 30 yrs ago a mild one\"    Shortness of breath on exertion     Teeth missing     Upper And Lower    Thyroid disease     Wears dentures     Full Upper    Wears glasses     Wears hearing aid     Bilateral Ears     Past Surgical History:   Procedure Laterality Date    BRONCHOSCOPY N/A 12/7/2018    BRONCHOSCOPY/TRANSBRONCHIAL LUNG BIOPSY performed by Masoud Gotti MD at [address]  1980's Or 1990's    CHOLECYSTECTOMY, LAPAROSCOPIC  2002    COLONOSCOPY  Last Done In 2006    Polyps Removed In Past    COLONOSCOPY N/A 4/9/2019    COLONOSCOPY DIAGNOSTIC performed by Maribel Ruiz MD at [address] [facility] [address]      Teeth Extracted In Past    EYE SURGERY Bilateral 2000's    \"Laser For Glaucoma\"    KNEE ARTHROSCOPY Bilateral 1970's To 1990's    \"Numerous\"    KNEE SURGERY Left 1980's Or 1990's    LUNG REMOVAL, TOTAL Left 1/28/2019    THORACOTOMY LEFT UPPER LOBECTOMY ROBOTIC ASSISTED performed by Yanna Delgado MD at [address]  1980's     Family History   Problem Relation Age of Onset    Heart Disease Mother         Enlarged Heart    High Blood Pressure Mother     Heart Attack Father     Stroke Father     COPD Sister     Diabetes Brother      Social History     Tobacco Use    Smoking status: Never Smoker    Smokeless tobacco: Never Used   Substance Use Topics    Alcohol use: Never     Frequency: Never        Review of Systems:   · Constitutional: No Fever or Weight Loss   · Eyes: No Decreased Vision  · ENT: No Headaches, Hearing Loss or Vertigo  · Cardiovascular: as per note above   · Respiratory: as per HPI  · Gastrointestinal: No abdominal pain, appetite loss, blood in stools, constipation, diarrhea or heartburn  · Genitourinary: No dysuria, trouble voiding, or hematuria  · Musculoskeletal:  None  · Integumentary: No rash or pruritis  · Neurological: No TIA or stroke symptoms  · Psychiatric: insomnia  · Endocrine: No malaise, fatigue or temperature intolerance  · Hematologic/Lymphatic: No bleeding problems, blood clots or swollen lymph nodes  · Allergic/Immunologic: No nasal congestion or hives    Objective:      Physical Exam:  /82   Pulse 62   Ht 6' (1.829 m)   Wt 202 lb (91.6 kg)   BMI 27.40 kg/m²   Wt Readings from Last 3 Encounters:   10/06/20 202 lb (91.6 kg)   05/31/20 200 lb (90.7 kg)   11/06/19 198 lb 6.4 oz (90 kg)     Body mass index is 27.4 kg/m².   Vitals: 10/06/20 1512   BP: 130/82   Pulse: 62        General Appearance:  No distress, conversant  Constitutional:  Well developed, Well nourished, No acute distress, Non-toxic appearance. HENT:  Normocephalic, Atraumatic, Bilateral external ears normal, Oropharynx moist, No oral exudates, Nose normal. Neck- Normal range of motion, No tenderness, Supple, No stridor,no apical-carotid delay  Eyes:  PERRL, EOMI, Conjunctiva normal, No discharge. Respiratory:  Normal breath sounds, No respiratory distress, No wheezing, No chest tenderness. ,no use of accessory muscles, NO crackles  Cardiovascular: (PMI) apex non displaced,no lifts no thrills,S1 and S2 audible, No added heart sounds, No signs of ankle edema, or volume overload, No evidence of JVD, No crackles  GI:  Bowel sounds normal, Soft, No tenderness, No masses, No gross visceromegaly   :  No costovertebral angle tenderness   Musculoskeletal:  No edema, no tenderness, no deformities.  Back- no tenderness  Integument:  Well hydrated, no rash   Lymphatic:  No lymphadenopathy noted   Neurologic:  Alert & oriented x 3, CN 2-12 normal, normal motor function, normal sensory function, no focal deficits noted   Psychiatric:  Speech and behavior appropriate       Medical decision making and Data review:  DATA:  Lab Results   Component Value Date    TROPONINT <0.010 10/09/2019     BNP:    Lab Results   Component Value Date    PROBNP 404.4 (H) 07/02/2019     PT/INR:  No results found for: PTINR  Lab Results   Component Value Date    LABA1C 6.9 (H) 08/22/2019    LABA1C 7.1 11/02/2018     Lab Results   Component Value Date    CHOL 117 11/02/2018    TRIG 178 (H) 11/02/2018    HDL 43 11/02/2018    LDLCALC 54 03/12/2013     Lab Results   Component Value Date    ALT 14 05/31/2020    AST 19 05/31/2020     TSH:   Lab Results   Component Value Date    TSH 2.370 11/02/2018     Lab Results   Component Value Date    AST 19 05/31/2020    ALT 14 05/31/2020    BILIDIR 0.2 04/23/2019    BILITOT 0.6 05/31/2020    ALKPHOS 55 05/31/2020     Lab Results   Component Value Date    PROBNP 404.4 (H) 07/02/2019    PROBNP 140.2 02/24/2019    PROBNP 110.0 11/07/2018     Lab Results   Component Value Date    LABA1C 6.9 (H) 08/22/2019    LABA1C 7.1 11/02/2018     Lab Results   Component Value Date    WBC 8.1 05/31/2020    HGB 12.8 (L) 05/31/2020    HCT 40.8 (L) 05/31/2020     05/31/2020   echo 11*/20/18   Summary   Normal left ventricle structure and systolic function with an ejection   fraction of 55-60%. Grade I diastolic dysfunction. No significant valvular disease noted. No evidence of pericardial effusion. Essentially unremarkable echo. Stress test 11/20/18     Jennifer Worthy .   [place] portion of stress test is negative for ischemia by diagnostic criteria.    Normal EF 61 % with normal ventricular contractility.    No infarct or ischemia noted.    Normal stress myocardial perfusion.    Normal study     holter 12/3/18  9 beat run of SVT , no other arrhythmias noted , No diary provided, primarily sinus rythym  Clinical correlation needed    Cath 4/23/19  Conclusions      Procedure Summary   Access : Radial   Indication: unstable angina Class IV   1. FFR of proximal LAD was 0.78   2. PCI to Proximal LAD using 3.25 X 38 KUMAR inflated to 18 atms ,   distal LAD towards apex has 90 % lesion   3. mid circ has 20- 30 % lesion   4. RCA is patent but small       Angiographic Findings    Diagnostic Findings      Cardiac Arteries and Lesion Findings     LMCA: Normal, Normal (no stenosis %) and No Angiographicalyl Significant  Disease. heavily calcified    LAD: Abnormal.proximal segment has long 70 to 80 % lesion, first diag is  diffusely diseased has 80% lesion , distal lad at apex has 90 % lesion ( small  vessel after apex       Lesion on Prox LAD: 80% stenosis. Culprit lesion.       Devices used       - [place] Pressure Wire.  Number of passes: 1.       - 3.25 x 38 RX US Xience Lilo KUMAR. 2 inflation(s) to a max pressure of:    20 kian.     LCx: Normal, Normal (no stenosis %) and No Angiographicalyl Significant  Disease. mid circ at OM take off has 20 to 30 % stenosis, OM 1 is widely patent    RCA: Normal (no stenosis %), No Angiographicalyl Significant Disease and Mild  Lumen Irregularity. diffusely calcified small calibre vessel but widely patent        All labs, medications and tests reviewed by myself including data and history from outside source , patient and available family . Assessment & Plan:      1. SOB (shortness of breath)    2. Angina at rest [facility])    3. ASCVD (arteriosclerotic cardiovascular disease)    4. Chest pain, unspecified type    5. Coronary artery disease involving native coronary artery of native heart with unstable angina pectoris ([facility] [address]    6. Type 2 diabetes mellitus without complication, without long-term current use of insulin (HCC)    7. Hyperlipidemia associated with type 2 diabetes mellitus ([facility]    8. Interstitial lung disease ([facility] [address]    9. MVP (mitral valve prolapse)    10. Orthostatic hypotension    11. Pleural effusion    12. Severe obstructive sleep apnea    13. Shortness of breath       ASCVD / chest pain   Chest pain improved after PCI to LAD , He still has apical LAD disease we will manage it medically . Because of low blood pressures and episodes of dizziness had stopped his metoprolol and Imdur in the past.  Dizziness did improve after stopping them. Since he is having some shortness of breath on exertion and there is a question of whether angina causing his symptoms we will try Ranexa for 6 weeks and then reevaluate. We will also get a stress test to rule out ischemia as etiology    Palpitations/tachycardia  We will proceed with 30-day event monitor to rule out A. fib as etiology he may be having episodes of SVT certainly is at risk due to his lung disease    ASCVD (arteriosclerotic cardiovascular disease)  S/p PCi to LAd in April 2019 . Continue aspirin and statins      Chronic respiratory failure  He has been oxygen dependent ever since his end of bronchial mass resection he is on 2 L of oxygen around-the-clock. Sees pulmonology followed very closely there is concerns for him developing interstitial lung disease I do not have the CT scan is also reports of asked wife to get them he had [facility]           Dyslipidemia :  All available lab work was reviewed. Patient was advised to repeat lab work before next visit      Counseled extensively and medication compliance urged. We discussed that for the  prevention of ASCVD our  goal is aggressive risk modification. Patient is encouraged to exercise even a brisk walk for 30 minutes  at least 3 to 4 times a week   Various goals were discussed and questions answered. Continue current medications. Appropriate prescriptions are addressed and refills ordered. Questions answered and patient verbalizes understanding. Call for any problems, questions, or concerns. Continue all other medications of all above medical condition listed as is. Return in about 6 weeks (around 11/17/2020). Please note this report has been partially produced using speech recognition software and may contain errors related to that system including errors in grammar, punctuation, and spelling, as well as words and phrases that may be inappropriate.  If there are any questions or concerns please feel free to contact the dictating provider for clarification.

## 2020-10-13 ENCOUNTER — TELEPHONE (OUTPATIENT)
Dept: CARDIOLOGY CLINIC | Age: 80
End: 2020-10-13

## 2020-10-13 NOTE — TELEPHONE ENCOUNTER
Wife called patient is wearing a 30 day event monitor has   Had on since 10/6 today it has shut off , H went to vote  While he was walking he experienced some chest   Pain when they got home they attempted to record  The event and the monitor has shut off it was fully charged  Advised to call [phone] # on box as well

## 2020-10-15 ENCOUNTER — PROCEDURE VISIT (OUTPATIENT)
Dept: CARDIOLOGY CLINIC | Age: 80
End: 2020-10-15
Payer: MEDICARE

## 2020-10-15 PROCEDURE — 93017 CV STRESS TEST TRACING ONLY: CPT | Performed by: INTERNAL MEDICINE

## 2020-10-15 PROCEDURE — A9500 TC99M SESTAMIBI: HCPCS | Performed by: INTERNAL MEDICINE

## 2020-10-15 PROCEDURE — 78452 HT MUSCLE IMAGE SPECT MULT: CPT | Performed by: INTERNAL MEDICINE

## 2020-10-15 PROCEDURE — 93016 CV STRESS TEST SUPVJ ONLY: CPT | Performed by: INTERNAL MEDICINE

## 2020-10-15 PROCEDURE — 93018 CV STRESS TEST I&R ONLY: CPT | Performed by: INTERNAL MEDICINE

## 2020-10-20 ENCOUNTER — TELEPHONE (OUTPATIENT)
Dept: CARDIOLOGY CLINIC | Age: 80
End: 2020-10-20

## 2020-10-20 NOTE — TELEPHONE ENCOUNTER
Spoke with patient wife regarding test results, voiced understanding.  Appointment made 11/3 with Dr. Vasquez Lorenzo physician Dr. Soraida Diallo .    [place] portion of stress test is negative for ischemia by diagnostic criteria.     Possible afib or atrial flutter post infusion , resolved within a minute     Severe anterior medium to large area of ischemia     Abnormal stress test          Signatures

## 2020-11-03 ENCOUNTER — OFFICE VISIT (OUTPATIENT)
Dept: CARDIOLOGY CLINIC | Age: 80
End: 2020-11-03
Payer: MEDICARE

## 2020-11-03 ENCOUNTER — TELEPHONE (OUTPATIENT)
Dept: CARDIOLOGY CLINIC | Age: 80
End: 2020-11-03

## 2020-11-03 ENCOUNTER — PROCEDURE VISIT (OUTPATIENT)
Dept: CARDIOLOGY CLINIC | Age: 80
End: 2020-11-03

## 2020-11-03 ENCOUNTER — HOSPITAL ENCOUNTER (OUTPATIENT)
Age: 80
Setting detail: SPECIMEN
Discharge: HOME | End: 2020-11-03
Payer: MEDICARE

## 2020-11-03 ENCOUNTER — PROCEDURE VISIT (OUTPATIENT)
Dept: CARDIOLOGY CLINIC | Age: 80
End: 2020-11-03
Payer: MEDICARE

## 2020-11-03 VITALS
HEART RATE: 68 BPM | BODY MASS INDEX: 27.64 KG/M2 | WEIGHT: 203.8 LBS | DIASTOLIC BLOOD PRESSURE: 82 MMHG | TEMPERATURE: 97.2 F | SYSTOLIC BLOOD PRESSURE: 124 MMHG

## 2020-11-03 LAB
LV EF: 53 %
LVEF MODALITY: NORMAL

## 2020-11-03 PROCEDURE — G8484 FLU IMMUNIZE NO ADMIN: HCPCS | Performed by: INTERNAL MEDICINE

## 2020-11-03 PROCEDURE — G8417 CALC BMI ABV UP PARAM F/U: HCPCS | Performed by: INTERNAL MEDICINE

## 2020-11-03 PROCEDURE — 99214 OFFICE O/P EST MOD 30 MIN: CPT | Performed by: INTERNAL MEDICINE

## 2020-11-03 PROCEDURE — G8427 DOCREV CUR MEDS BY ELIG CLIN: HCPCS | Performed by: INTERNAL MEDICINE

## 2020-11-03 PROCEDURE — U0002 COVID-19 LAB TEST NON-CDC: HCPCS

## 2020-11-03 PROCEDURE — 1036F TOBACCO NON-USER: CPT | Performed by: INTERNAL MEDICINE

## 2020-11-03 PROCEDURE — 93306 TTE W/DOPPLER COMPLETE: CPT | Performed by: INTERNAL MEDICINE

## 2020-11-03 PROCEDURE — 1123F ACP DISCUSS/DSCN MKR DOCD: CPT | Performed by: INTERNAL MEDICINE

## 2020-11-03 PROCEDURE — 4040F PNEUMOC VAC/ADMIN/RCVD: CPT | Performed by: INTERNAL MEDICINE

## 2020-11-03 RX ORDER — SERTRALINE HYDROCHLORIDE 100 MG/1
1 TABLET, FILM COATED ORAL DAILY
COMMUNITY
Start: 2020-08-07 | End: 2021-06-01

## 2020-11-03 NOTE — TELEPHONE ENCOUNTER
Pt here in office & educated on [facility] for Dx: CAD, Chest pain, scheduled for 11/6/20 @ 11 AM, w/arrival @ 9 AM, @ [facility]; risks explained; & consents signed. Pre-admission orders given to pt. COVID testing @ [facility] today. Instructions given to pt to:  NPO after midnight night before procedure; Call hospital @ [phone] to pre-register; May take rest of morning meds. am of procedure; & pt voiced understanding. Copies of consent, pre-testing orders, & info. sheet scanned into chart.

## 2020-11-03 NOTE — PROGRESS NOTES
CARDIOLOGY  NOTE    Chief Complaint: Chest pain and shortness of breath    HPI:   Celine Zuniga is a 79y.o. year old who has history as noted below. Mr. Linda Escudero is here with his wife he is extremely short of breath is on oxygen around-the-clock  oxygen saw pulmonology and had a CT scan done it appears that he has progressive lung disease and interstitial lung fibrosis with emphysema and pleural effusion. HE had stress test during which he went into atrial tachycardia or atrial rhythm how ever his 30 day monitor  is still pending 30-day event monitor he gets winded and short of breath on minimal exertion. I had him walk in the hallway in office today with 2 L of oxygen  While walking in the hallway today his oxygen level stayed at 90% but he got dizzy says his legs got weak when he was walking. Neftali Dodd Heart rate went up to 100s he got exhausted. He says after taking a shower he could barely function due to shortness of breath. Stress test unfortunately shows anterior wall large area of ischemia     He status post lung resection. He does report some chest tightness when he tries to walk or do anything exertional related. He continues struggle with significant shortness of breath and episodes of tachycardia. His pulmonologist raised a valid question whether he is going into A. fib ? Unfortunately 3-day monitor is still pending   HE is on O2 ATC . When he was in cardiac rehab he would have frequent low blood pressures in the 80s although is not going to cardiac rehab anymore but in office today his blood pressure is 110 x 80   He had LAD stent due to abnormal FFR in April 2019 .he is struggling with depression as well. CT chest shows left lung post resection changes and also loculated effusion . Neftali Dodd He feels He is tired a lot. He cannot sleep. Neftali Dodd He had lung surgery due to lung mass and Jan 2019.   He says that he has not felt good since then    Current Outpatient Medications Medication Sig Dispense Refill    sertraline (ZOLOFT) 100 MG tablet Take 1 tablet by mouth daily      ranolazine (RANEXA) 500 MG extended release tablet Take 1 tablet by mouth 2 times daily 60 tablet 3    aspirin 81 MG EC tablet Take 1 tablet by mouth every morning Over The Counter 90 tablet 1    donepezil (ARICEPT) 10 MG tablet Take 1 tablet by mouth nightly 90 tablet 1    atorvastatin (LIPITOR) 20 MG tablet Take 1 tablet by mouth daily 90 tablet 1    pioglitazone (ACTOS) 45 MG tablet Take 1 tablet by mouth daily 90 tablet 1    SITagliptin (JANUVIA) 100 MG tablet Take 1 tablet by mouth daily 90 tablet 1    levothyroxine (SYNTHROID) 50 MCG tablet Take 1 tablet by mouth Daily 90 tablet 1    Cholecalciferol (VITAMIN D3) 5000 units TABS Take by mouth every morning Over The Counter       No current facility-administered medications for this visit. Allergies:   Norco [hydrocodone-acetaminophen]    Patient History:  Past Medical History:   Diagnosis Date    Arthritis     \"Back, Knees\"    Asthma     Back pain     \"At Times\"    CAD (coronary artery disease)     cardiovascular stress test 10/15/2020    possible afib or atrial flutter post infusion severe anterior medium to large ischemia     Diabetes mellitus ([address]     dx 5+ yrs ago-     Glaucoma     Bilateral Eyes    H/O 24 hour EKG monitoring 11-    Predominantly SR - avg rate of 69bpm. Lowest rate 46 bpm at 0622. Frequent isolated 5954 PVCs noted mostly in bigemenal pattern w/out complex arrhythmias. Five beat run of atrial tachycardia at 1228. No palpitations or dizziness reported in patient's daily activity report. (12-, )    H/O cardiac catheterization 06-    Noncritical diffuse CAD w/no critical stenosis. Diag borderline significant stenosis, not large enough fo percutaneous intervention. No significant angiographic progression of atherosclerosis since cath in 1997.   ( per Dr. Mancuso Resides at [facility].  Preserved vent function. MVP. CAD w/normal LM, 30-40% stenosis LAD, 70-80% stenosis of ostium & prox seg diag branch, normal left CX & RCA.)    H/O cardiovascular stress test 1/30/2013, 06- 1/30/2013-Normal study. Normal perfusion in all regions. EF 62%. 06--EF=60%. Normal. Normal pattern of perfusion. LV normal size. No wall abnormality. Exercise capacity good. (05-, 12-, 06-, , )    H/O chest pain     H/O chest x-ray 06-    Negative. Dx was dyspnea and CAD.  (02-)    H/O dizziness     H/O Doppler ultrasound 11-    (Carotid) Intimal thickening but no significant atherosclerotic plaque noted in right or left ICA. Doppler flow velocities w/in right and left ICA WNL.  H/O echocardiogram 1/30/2013, 05-    1/30/2013- LVSF normal. EF 50-55%. Impaired LV relaxation. Trace MR. Trace TR;   5- Normal LV size and systolic function. EF=60%. Chamber dimensions WNL. Mild concentric LV hypertrophy. Valves appear structurally normal.-OICC       H/O gastroesophageal reflux (GERD)     H/O pneumothorax Dx 1960    \"Both Sides\"    History of cardiac monitoring 12/03/2018    9 beat run of SVT, no other arrhythmias noted, primarily sinus rythym    History of complete ECG 06/06/2011    (06-, 07-, 06-, 06-)    History of exercise stress test 05/21/2019    Treadmill, Stopped due to fatigue and  Dyspnea.  History of stress test 11/07/2018    EF 61%, Normal    [place] (hard of hearing)     Bilateral Hearing Aids    Hx of CT scan of chest 11/12/2018    3 mm indeterminate solid nonobstructing endobronchial nodule of the proximal left upper lobe bronchus. Further evaluation with endoscopy is recommended. Bilateral basilar predominant intersitial changes suggestive of nonspecific interstitial pneumonitis.     Hx of Doppler echocardiogram 11/20/2018    EF55-60%,no valvular disease    Hyperlipidemia     Hypertension     Left Lung Cancer Dx 11-18    LEFT UPPER LOBECTOMY 1/28/19    Lung nodule     \"they say I have a spot on my lung- and mass in left lung so thats why doing the bronch\"( 12/7/2018)    Memory loss     MVP (mitral valve prolapse)     follows with Dr Sharifa May (myocardial infarction)     \"had heart attack 30 yrs ago a mild one\"    Shortness of breath on exertion     Teeth missing     Upper And Lower    Thyroid disease     Wears dentures     Full Upper    Wears glasses     Wears hearing aid     Bilateral Ears     Past Surgical History:   Procedure Laterality Date    BRONCHOSCOPY N/A 12/7/2018    BRONCHOSCOPY/TRANSBRONCHIAL LUNG BIOPSY performed by Adore Anderson MD at [address]  [id number] Or 1990's    CHOLECYSTECTOMY, LAPAROSCOPIC  2002    COLONOSCOPY  Last Done In 2006    Polyps Removed In Past    COLONOSCOPY N/A 4/9/2019    COLONOSCOPY DIAGNOSTIC performed by Cherise Perez MD at [address] [facility]      Teeth Extracted In Past    EYE SURGERY Bilateral 2000's    \"Laser For Glaucoma\"    KNEE ARTHROSCOPY Bilateral 1970's To 1990's    \"Numerous\"    KNEE SURGERY Left 1980's Or 1990's    LUNG REMOVAL, TOTAL Left 1/28/2019    THORACOTOMY LEFT UPPER LOBECTOMY ROBOTIC ASSISTED performed by Jeanmarie Parra MD at [address]  1980's     Family History   Problem Relation Age of Onset    Heart Disease Mother         Enlarged Heart    High Blood Pressure Mother     Heart Attack Father     Stroke Father     COPD Sister     Diabetes Brother      Social History     Tobacco Use    Smoking status: Never Smoker    Smokeless tobacco: Never Used   Substance Use Topics    Alcohol use: Never     Frequency: Never        Review of Systems:   · Constitutional: No Fever or Weight Loss   · Eyes: No Decreased Vision  · ENT: No Headaches, Hearing Loss or Vertigo  · Cardiovascular: as per note above   · Respiratory: as per HPI  · Gastrointestinal: No abdominal pain, appetite loss, blood in stools, constipation, diarrhea or heartburn  · Genitourinary: No dysuria, trouble voiding, or hematuria  · Musculoskeletal:  None  · Integumentary: No rash or pruritis  · Neurological: No TIA or stroke symptoms  · Psychiatric: insomnia  · Endocrine: No malaise, fatigue or temperature intolerance  · Hematologic/Lymphatic: No bleeding problems, blood clots or swollen lymph nodes  · Allergic/Immunologic: No nasal congestion or hives    Objective:      Physical Exam:  /82   Pulse 68   Temp 97.2 °F (36.2 °C) (Temporal)   Wt 203 lb 12.8 oz (92.4 kg)   BMI 27.64 kg/m²   Wt Readings from Last 3 Encounters:   11/03/20 203 lb 12.8 oz (92.4 kg)   10/06/20 202 lb (91.6 kg)   05/31/20 200 lb (90.7 kg)     Body mass index is 27.64 kg/m². Vitals:    11/03/20 1544   BP: 124/82   Pulse: 68   Temp: 97.2 °F (36.2 °C)        General Appearance:  No distress, conversant needs assistance getting out of chair is frail and weak in the proximal thigh muscles  Constitutional:  Well developed, Well nourished, No acute distress, Non-toxic appearance. HENT:  Normocephalic, Atraumatic, Bilateral external ears normal, Oropharynx moist, No oral exudates, Nose normal. Neck- Normal range of motion, No tenderness, Supple, No stridor,no apical-carotid delay  Eyes:  PERRL, EOMI, Conjunctiva normal, No discharge. Respiratory:  Normal breath sounds, No respiratory distress, No wheezing, No chest tenderness. ,no use of accessory muscles, NO crackles  Cardiovascular: (PMI) apex non displaced,no lifts no thrills,S1 and S2 audible, No added heart sounds, No signs of ankle edema, or volume overload, No evidence of JVD, No crackles  GI:  Bowel sounds normal, Soft, No tenderness, No masses, No gross visceromegaly   :  No costovertebral angle tenderness   Musculoskeletal:  No edema, no tenderness, no deformities.  Back- no tenderness  Integument:  Well hydrated, no rash   Lymphatic:  No lymphadenopathy noted Neurologic:  Alert & oriented x 3, CN 2-12 normal, weak proximal thigh muscle, normal sensory function, no focal deficits noted   Psychiatric:  Speech and behavior appropriate       Medical decision making and Data review:  DATA:  Lab Results   Component Value Date    TROPONINT <0.010 10/09/2019     BNP:    Lab Results   Component Value Date    PROBNP 404.4 (H) 07/02/2019     PT/INR:  No results found for: PTINR  Lab Results   Component Value Date    LABA1C 6.9 (H) 08/22/2019    LABA1C 7.1 11/02/2018     Lab Results   Component Value Date    CHOL 117 11/02/2018    TRIG 178 (H) 11/02/2018    HDL 43 11/02/2018    LDLCALC 54 03/12/2013     Lab Results   Component Value Date    ALT 14 05/31/2020    AST 19 05/31/2020     TSH:   Lab Results   Component Value Date    TSH 2.370 11/02/2018     Lab Results   Component Value Date    AST 19 05/31/2020    ALT 14 05/31/2020    BILIDIR 0.2 04/23/2019    BILITOT 0.6 05/31/2020    ALKPHOS 55 05/31/2020     Lab Results   Component Value Date    PROBNP 404.4 (H) 07/02/2019    PROBNP 140.2 02/24/2019    PROBNP 110.0 11/07/2018     Lab Results   Component Value Date    LABA1C 6.9 (H) 08/22/2019    LABA1C 7.1 11/02/2018     Lab Results   Component Value Date    WBC 8.1 05/31/2020    HGB 12.8 (L) 05/31/2020    HCT 40.8 (L) 05/31/2020     05/31/2020   echo 11*/20/18   Summary   Normal left ventricle structure and systolic function with an ejection   fraction of 55-60%. Grade I diastolic dysfunction. No significant valvular disease noted. No evidence of pericardial effusion. Essentially unremarkable echo.        Stress test 11/20/18     Sridevi Jordan .   [place] portion of stress test is negative for ischemia by diagnostic criteria.    Normal EF 61 % with normal ventricular contractility.    No infarct or ischemia noted.    Normal stress myocardial perfusion.    Normal study     holter 12/3/18  9 beat run of SVT , no other arrhythmias noted , No unstable angina pectoris ([address]    5. Type 2 diabetes mellitus without complication, without long-term current use of insulin ([address]    6. Interstitial lung disease ([address]    7. SOB (shortness of breath) on exertion    8. Severe obstructive sleep apnea    9. Pleural effusion       ASCVD / chest pain   Chest pain improved after PCI to LAD , Ranexa has not helped him at all the stress test shows anterior wall ischemia he is very symptomatic. Due to low blood pressures and episodes of dizziness had stopped his metoprolol and Imdur in the past.  Dizziness did improve after stopping them. Since he is having some shortness of breath on exertion. And he is not having any quality of life we will proceed with cardiac catheterization to define coronary anatomy  Will plan left and right cath   He is clearly very weak and deconditioned as well  Alternates and risk of the procedure were dicussed in detail  Patient is in agreement to proceed  Mallampati is 2  ASA is  3    Palpitations/tachycardia  30-day event monitor is currently pending I would really like to rule out A. fib as etiology he may be having episodes of SVT certainly is at risk due to his lung disease        Chronic respiratory failure  He has been oxygen dependent ever since his end of bronchial mass resection he is on 2 L of oxygen around-the-clock. Sees pulmonology followed very closely there is concerns for him developing interstitial lung disease I do not have the CT scan is also reports of asked wife to get them he had CT scan at [address]  Will get echo to evaluate right side and also get a right heart cath         Dyslipidemia :  All available lab work was reviewed. Patient was advised to repeat lab work before next visit      Counseled extensively and medication compliance urged. We discussed that for the  prevention of ASCVD our  goal is aggressive risk modification. Patient is encouraged to exercise even a brisk walk for 30 minutes  at least 3 to 4 times a week   Various goals were discussed and questions answered. Continue current medications. Appropriate prescriptions are addressed and refills ordered. Questions answered and patient verbalizes understanding. Call for any problems, questions, or concerns. Continue all other medications of all above medical condition listed as is. Return in about 1 month (around 12/3/2020). Please note this report has been partially produced using speech recognition software and may contain errors related to that system including errors in grammar, punctuation, and spelling, as well as words and phrases that may be inappropriate.  If there are any questions or concerns please feel free to contact the dictating provider for clarification.

## 2020-11-04 ENCOUNTER — HOSPITAL ENCOUNTER (OUTPATIENT)
Age: 80
Discharge: HOME | End: 2020-11-04
Payer: MEDICARE

## 2020-11-04 ENCOUNTER — HOSPITAL ENCOUNTER (OUTPATIENT)
Dept: GENERAL RADIOLOGY | Age: 80
Discharge: HOME | End: 2020-11-04
Payer: MEDICARE

## 2020-11-04 LAB
ABO/RH: NORMAL
ANION GAP SERPL CALCULATED.3IONS-SCNC: 8 MMOL/L (ref 4–16)
ANTIBODY SCREEN: NEGATIVE
APTT: 33.3 SECONDS (ref 25.1–37.1)
BASOPHILS ABSOLUTE: 0.1 K/CU MM
BASOPHILS RELATIVE PERCENT: 1 % (ref 0–1)
BUN BLDV-MCNC: 21 MG/DL (ref 6–23)
CALCIUM SERPL-MCNC: 10.4 MG/DL (ref 8.3–10.6)
CHLORIDE BLD-SCNC: 101 MMOL/L (ref 99–110)
CO2: 32 MMOL/L (ref 21–32)
COMMENT: NORMAL
CREAT SERPL-MCNC: 1.3 MG/DL (ref 0.9–1.3)
DIFFERENTIAL TYPE: ABNORMAL
EOSINOPHILS ABSOLUTE: 0.3 K/CU MM
EOSINOPHILS RELATIVE PERCENT: 3.3 % (ref 0–3)
GFR AFRICAN AMERICAN: >60 ML/MIN/1.73M2
GFR NON-AFRICAN AMERICAN: 53 ML/MIN/1.73M2
GLUCOSE BLD-MCNC: 156 MG/DL (ref 70–99)
HCT VFR BLD CALC: 41.3 % (ref 42–52)
HEMOGLOBIN: 13 GM/DL (ref 13.5–18)
IMMATURE NEUTROPHIL %: 0.5 % (ref 0–0.43)
INR BLD: 0.93 INDEX
LYMPHOCYTES ABSOLUTE: 2.9 K/CU MM
LYMPHOCYTES RELATIVE PERCENT: 35.3 % (ref 24–44)
MCH RBC QN AUTO: 31.4 PG (ref 27–31)
MCHC RBC AUTO-ENTMCNC: 31.5 % (ref 32–36)
MCV RBC AUTO: 99.8 FL (ref 78–100)
MONOCYTES ABSOLUTE: 0.6 K/CU MM
MONOCYTES RELATIVE PERCENT: 6.7 % (ref 0–4)
NUCLEATED RBC %: 0 %
PDW BLD-RTO: 14.4 % (ref 11.7–14.9)
PLATELET # BLD: 181 K/CU MM (ref 140–440)
PMV BLD AUTO: 11.6 FL (ref 7.5–11.1)
POTASSIUM SERPL-SCNC: 5.1 MMOL/L (ref 3.5–5.1)
PROTHROMBIN TIME: 11.3 SECONDS (ref 11.7–14.5)
RBC # BLD: 4.14 M/CU MM (ref 4.6–6.2)
SEGMENTED NEUTROPHILS ABSOLUTE COUNT: 4.4 K/CU MM
SEGMENTED NEUTROPHILS RELATIVE PERCENT: 53.2 % (ref 36–66)
SODIUM BLD-SCNC: 141 MMOL/L (ref 135–145)
TOTAL IMMATURE NEUTOROPHIL: 0.04 K/CU MM
TOTAL NUCLEATED RBC: 0 K/CU MM
WBC # BLD: 8.3 K/CU MM (ref 4–10.5)

## 2020-11-04 PROCEDURE — 85025 COMPLETE CBC W/AUTO DIFF WBC: CPT

## 2020-11-04 PROCEDURE — 86900 BLOOD TYPING SEROLOGIC ABO: CPT

## 2020-11-04 PROCEDURE — 85730 THROMBOPLASTIN TIME PARTIAL: CPT

## 2020-11-04 PROCEDURE — 80048 BASIC METABOLIC PNL TOTAL CA: CPT

## 2020-11-04 PROCEDURE — 86850 RBC ANTIBODY SCREEN: CPT

## 2020-11-04 PROCEDURE — 71046 X-RAY EXAM CHEST 2 VIEWS: CPT

## 2020-11-04 PROCEDURE — 86901 BLOOD TYPING SEROLOGIC RH(D): CPT

## 2020-11-04 PROCEDURE — 85610 PROTHROMBIN TIME: CPT

## 2020-11-04 PROCEDURE — 36415 COLL VENOUS BLD VENIPUNCTURE: CPT

## 2020-11-05 ENCOUNTER — TELEPHONE (OUTPATIENT)
Dept: CARDIOLOGY CLINIC | Age: 80
End: 2020-11-05

## 2020-11-05 LAB
SARS-COV-2: NOT DETECTED
SOURCE: NORMAL

## 2020-11-05 NOTE — TELEPHONE ENCOUNTER
Results given to wife(caregiver) voiced understanding. Some improvement on the left compared to previous exam.           Conclusions      Summary   Limited study due to patients body habitus lung surgery. Left ventricular function is low normal, EF is estimated at 50-55%. Mild left ventricular hypertrophy. Normal diastolic filling pattern for age. Mildly dilated left atrium. Mild tricuspid regurgitation; RVSP is 32 mmHg. No significant valvular disease noted. No evidence of pericardial effusion.       Signature

## 2020-11-06 ENCOUNTER — HOSPITAL ENCOUNTER (OUTPATIENT)
Dept: CARDIAC CATH/INVASIVE PROCEDURES | Age: 80
Discharge: HOME | End: 2020-11-06
Attending: INTERNAL MEDICINE | Admitting: INTERNAL MEDICINE
Payer: MEDICARE

## 2020-11-06 VITALS
BODY MASS INDEX: 27.5 KG/M2 | DIASTOLIC BLOOD PRESSURE: 68 MMHG | RESPIRATION RATE: 15 BRPM | HEART RATE: 71 BPM | OXYGEN SATURATION: 99 % | HEIGHT: 72 IN | WEIGHT: 203 LBS | SYSTOLIC BLOOD PRESSURE: 109 MMHG | TEMPERATURE: 96.3 F

## 2020-11-06 LAB
ACTIVATED CLOTTING TIME, LOW RANGE: 248 SEC
GLUCOSE BLD-MCNC: 129 MG/DL (ref 70–99)

## 2020-11-06 PROCEDURE — 2709999900 HC NON-CHARGEABLE SUPPLY

## 2020-11-06 PROCEDURE — 6370000000 HC RX 637 (ALT 250 FOR IP): Performed by: INTERNAL MEDICINE

## 2020-11-06 PROCEDURE — 85347 COAGULATION TIME ACTIVATED: CPT

## 2020-11-06 PROCEDURE — 93460 R&L HRT ART/VENTRICLE ANGIO: CPT | Performed by: INTERNAL MEDICINE

## 2020-11-06 PROCEDURE — C1769 GUIDE WIRE: HCPCS

## 2020-11-06 PROCEDURE — C1751 CATH, INF, PER/CENT/MIDLINE: HCPCS

## 2020-11-06 PROCEDURE — 6360000004 HC RX CONTRAST MEDICATION

## 2020-11-06 PROCEDURE — 6360000002 HC RX W HCPCS

## 2020-11-06 PROCEDURE — 2500000003 HC RX 250 WO HCPCS

## 2020-11-06 PROCEDURE — 82962 GLUCOSE BLOOD TEST: CPT

## 2020-11-06 PROCEDURE — C1887 CATHETER, GUIDING: HCPCS

## 2020-11-06 PROCEDURE — 2580000003 HC RX 258: Performed by: INTERNAL MEDICINE

## 2020-11-06 PROCEDURE — 93460 R&L HRT ART/VENTRICLE ANGIO: CPT

## 2020-11-06 PROCEDURE — C1894 INTRO/SHEATH, NON-LASER: HCPCS

## 2020-11-06 RX ORDER — DIAZEPAM 5 MG/1
5 TABLET ORAL ONCE
Status: COMPLETED | OUTPATIENT
Start: 2020-11-06 | End: 2020-11-06

## 2020-11-06 RX ORDER — SODIUM CHLORIDE 9 MG/ML
INJECTION, SOLUTION INTRAVENOUS CONTINUOUS
Status: DISCONTINUED | OUTPATIENT
Start: 2020-11-06 | End: 2020-11-06 | Stop reason: HOSPADM

## 2020-11-06 RX ORDER — SODIUM CHLORIDE 0.9 % (FLUSH) 0.9 %
10 SYRINGE (ML) INJECTION EVERY 12 HOURS SCHEDULED
Status: CANCELLED | OUTPATIENT
Start: 2020-11-06

## 2020-11-06 RX ORDER — HYDRALAZINE HYDROCHLORIDE 20 MG/ML
10 INJECTION INTRAMUSCULAR; INTRAVENOUS EVERY 10 MIN PRN
Status: CANCELLED | OUTPATIENT
Start: 2020-11-06

## 2020-11-06 RX ORDER — SODIUM CHLORIDE 0.9 % (FLUSH) 0.9 %
10 SYRINGE (ML) INJECTION PRN
Status: CANCELLED | OUTPATIENT
Start: 2020-11-06

## 2020-11-06 RX ORDER — ONDANSETRON 2 MG/ML
4 INJECTION INTRAMUSCULAR; INTRAVENOUS EVERY 6 HOURS PRN
Status: CANCELLED | OUTPATIENT
Start: 2020-11-06

## 2020-11-06 RX ORDER — ACETAMINOPHEN 325 MG/1
650 TABLET ORAL EVERY 4 HOURS PRN
Status: CANCELLED | OUTPATIENT
Start: 2020-11-06

## 2020-11-06 RX ORDER — 0.9 % SODIUM CHLORIDE 0.9 %
500 INTRAVENOUS SOLUTION INTRAVENOUS ONCE
Status: CANCELLED | OUTPATIENT
Start: 2020-11-06 | End: 2020-11-06

## 2020-11-06 RX ORDER — DILTIAZEM HYDROCHLORIDE 120 MG/1
120 CAPSULE, COATED, EXTENDED RELEASE ORAL DAILY
Qty: 30 CAPSULE | Refills: 2 | Status: SHIPPED | OUTPATIENT
Start: 2020-11-06 | End: 2020-11-30

## 2020-11-06 RX ORDER — DIPHENHYDRAMINE HCL 25 MG
25 TABLET ORAL ONCE
Status: COMPLETED | OUTPATIENT
Start: 2020-11-06 | End: 2020-11-06

## 2020-11-06 RX ADMIN — SODIUM CHLORIDE: 9 INJECTION, SOLUTION INTRAVENOUS at 10:05

## 2020-11-06 RX ADMIN — DIAZEPAM 5 MG: 5 TABLET ORAL at 10:08

## 2020-11-06 RX ADMIN — DIPHENHYDRAMINE HYDROCHLORIDE 25 MG: 25 TABLET ORAL at 10:08

## 2020-11-06 NOTE — H&P
Jazmine Antony, 79 y.o., male    Primary care physician:  ASHLEE Pepe - CNP     Chief Complaint: Chest Pain    History of Present Illness:  Anika Walton is a 79y.o. year old who has history as noted below. Mr. Bull Shipley is here with his wife he is extremely short of breath is on oxygen around-the-clock  oxygen saw pulmonology and had a CT scan done it appears that he has progressive lung disease and interstitial lung fibrosis with emphysema and pleural effusion. HE had stress test during which he went into atrial tachycardia or atrial rhythm how ever his 30 day monitor  is still pending 30-day event monitor he gets winded and short of breath on minimal exertion. I had him walk in the hallway in office today with 2 L of oxygen  While walking in the hallway today his oxygen level stayed at 90% but he got dizzy says his legs got weak when he was walking. Shaaron [employer] Heart rate went up to 100s he got exhausted. He says after taking a shower he could barely function due to shortness of breath. Stress test unfortunately shows anterior wall large area of ischemia      He status post lung resection. He does report some chest tightness when he tries to walk or do anything exertional related. He continues struggle with significant shortness of breath and episodes of tachycardia. His pulmonologist raised a valid question whether he is going into A. fib ? Unfortunately 3-day monitor is still pending   HE is on O2 ATC . When he was in cardiac rehab he would have frequent low blood pressures in the 80s although is not going to cardiac rehab anymore but in office today his blood pressure is 110 x 80   He had LAD stent due to abnormal FFR in April 2019 .he is struggling with depression as well. CT chest shows left lung post resection changes and also loculated effusion . Shaaron Money He feels He is tired a lot. He cannot sleep. [employer] He had lung surgery due to lung mass and Jan 2019.   He says that he has not felt good since then  Past medical history:    has a past medical history of Arthritis, Asthma, Back pain, CAD (coronary artery disease), cardiovascular stress test, Diabetes mellitus [address] [address] echocardiogram, Glaucoma, H/O 24 hour EKG monitoring, H/O cardiac catheterization, H/O cardiovascular stress test, H/O chest pain, H/O chest x-ray, H/O dizziness, H/O Doppler ultrasound, H/O echocardiogram, H/O gastroesophageal reflux (GERD), H/O pneumothorax, History of cardiac monitoring, History of complete ECG, History of exercise stress test, History of stress test, [place] (hard of hearing), Hx of CT scan of chest, Hx of Doppler echocardiogram, Hyperlipidemia, Hypertension, Left Lung Cancer, Lung nodule, Memory loss, MVP (mitral valve prolapse), Old MI (myocardial infarction), Shortness of breath on exertion, Teeth missing, Thyroid disease, Wears dentures, Wears glasses, and Wears hearing aid. Past surgical history:   has a past surgical history that includes bronchoscopy (N/A, 12/7/2018); eye surgery (Bilateral, 2000's); Dental surgery; knee surgery (Left, 1980's Or 1990's); Knee arthroscopy (Bilateral, 1970's To 1990's); Vasectomy (1980's); Cardiac catheterization (1980's Or 1990's); Colonoscopy (Last Done In 2006); Cholecystectomy, laparoscopic (2002); Lung removal, total (Left, 1/28/2019); and Colonoscopy (N/A, 4/9/2019). Social History:   reports that he has never smoked. He has never used smokeless tobacco. He reports that he does not drink alcohol or use drugs. Family history:  family history includes COPD in his sister; Diabetes in his brother; Heart Attack in his father; Heart Disease in his mother; High Blood Pressure in his mother; Stroke in his father. Allergies: Allergies   Allergen Reactions    Norco [Hydrocodone-Acetaminophen] Other (See Comments)     Drops BP. Home Medications:  Prior to Admission medications    Medication Sig Start Date End Date Taking?  Authorizing Provider   sertraline (ZOLOFT) 100 MG tablet Take 1 tablet by mouth daily 8/7/20  Yes Historical Provider, MD   aspirin 81 MG EC tablet Take 1 tablet by mouth every morning Over The Counter 4/25/19  Yes Pelon Reyes MD   donepezil (ARICEPT) 10 MG tablet Take 1 tablet by mouth nightly 3/29/19  Yes Kaity Dominguez MD   atorvastatin (LIPITOR) 20 MG tablet Take 1 tablet by mouth daily 3/29/19  Yes Kaity Dominguez MD   pioglitazone (ACTOS) 45 MG tablet Take 1 tablet by mouth daily 3/29/19  Yes Kaity Dominguez MD   SITagliptin [facility]) 100 MG tablet Take 1 tablet by mouth daily 3/29/19  Yes Kaity Dominguez MD   levothyroxine (SYNTHROID) 50 MCG tablet Take 1 tablet by mouth Daily 3/26/19  Yes Kaity Dominguez MD   Cholecalciferol (VITAMIN D3) 5000 units TABS Take by mouth every morning Over The Counter   Yes Historical Provider, MD   ranolazine (RANEXA) 500 MG extended release tablet Take 1 tablet by mouth 2 times daily 10/6/20   Kalie Barclay MD       Review of systems: Review of Systems:   · Constitutional: No Fever or Weight Loss   · Eyes: No Decreased Vision  · ENT: No Headaches, Hearing Loss or Vertigo  · Cardiovascular: No chest pain, dyspnea on exertion, palpitations or loss of consciousness  · Respiratory: No cough or wheezing    · Gastrointestinal: No abdominal pain, appetite loss, blood in stools, constipation, diarrhea or heartburn  · Genitourinary: No dysuria, trouble voiding, or hematuria  · Musculoskeletal:  No gait disturbance, weakness or joint complaints  · Integumentary: No rash or pruritis  · Neurological: No TIA or stroke symptoms  · Psychiatric: No anxiety or depression  · Endocrine: No malaise, fatigue or temperature intolerance  · Hematologic/Lymphatic: No bleeding problems, blood clots or swollen lymph nodes  Allergic/Immunologic: No nasal congestion or hives    Physical Examination:    /77   Pulse 67   Temp 96.3 °F (35.7 °C) (Temporal)   Resp 14   Ht 6' (1.829 m)   Wt 203 lb (92.1 kg)   SpO2 100%   BMI 27.53 kg/m²      General Appearance:  No distress, conversant  Constitutional:  Well developed, Well nourished, No acute distress, Non-toxic appearance. HENT:  Normocephalic, Atraumatic, Bilateral external ears normal, Oropharynx moist, No oral exudates, Nose normal. Neck- Normal range of motion, No tenderness, Supple, No stridor,no apical-carotid delay  Eyes:  PERRL, EOMI, Conjunctiva normal, No discharge. Lymphatics: no palpable lymph nodes  Respiratory:  Normal breath sounds, No respiratory distress, No wheezing, No chest tenderness. ,no uise of accessory muscles, JVP not elevated  Cardiovascular: (PMI) apex non displaced,no lifts no thrills, no s3,no s4, Normal heart rate, Normal rhythm, No murmurs, No rubs, No gallops. GI:  Bowel sounds normal, Soft, No tenderness, No masses, No pulsatile masses, no hepatosplenomegally, no bruits  Musculoskeletal:  Intact distal pulses, No edema, No tenderness, No cyanosis, No clubbing. Good range of motion in all major joints. No tenderness to palpation or major deformities noted. Back- No tenderness. Integument:  Warm, Dry, No erythema, No rash. Skin: no rash, no ulcers  Lymphatic:  No lymphadenopathy noted. Neurologic:  Alert & oriented x 3, Normal motor function, Normal sensory function, No focal deficits noted. Lab Review   Recent Labs     11/04/20  1409   WBC 8.3   HGB 13.0*   HCT 41.3*         Recent Labs     11/04/20  1409      K 5.1      CO2 32   BUN 21   CREATININE 1.3     No results for input(s): AST, ALT, ALB, BILIDIR, BILITOT, ALKPHOS in the last 72 hours. No results for input(s): TROPONINI in the last 72 hours. Lab Results   Component Value Date    INR 0.93 11/04/2020    PROTIME 11.3 (L) 11/04/2020     No results found for: BNP  ASCVD / chest pain   Chest pain improved after PCI to LAD , Ranexa has not helped him at all the stress test shows anterior wall ischemia he is very symptomatic.   Due to low blood pressures and episodes of dizziness had stopped his metoprolol and Imdur in the past.  Dizziness did improve after stopping them. Since he is having some shortness of breath on exertion. And he is not having any quality of life we will proceed with cardiac catheterization to define coronary anatomy  Will plan left and right cath   He is clearly very weak and deconditioned as well  Alternates and risk of the procedure were dicussed in detail  Patient is in agreement to proceed  Mallampati is 2  ASA is  3       Assessment:    Active Problems:    * No active hospital problems. *     ASA : 2 , Mallampati class II  Plan:   1. Chest Pain/ Possible Angina: Alternate and risks versus benefits were discussed in detail. We will plan cardiac catheterization. We  discussed the risk of but not limited to  potential kidney failure, emergent surgery,blood transfusion  transfusion, infection, potential even death.   Patient is in agreement and wants to proceed

## 2020-11-06 NOTE — PROGRESS NOTES
Discharge instructions reviewed with patient and wife. Voiced understanding. Pt able to stand at side of bed without difficulty. Rt femoral and radial sites remains free of bleeding or hematoma. TR Band removed and sterile dressing applied, radial armboard secured. Upon switch patient from our oxygen to his home oxygen tank, patient noted his tank had been left on and was empty. I called Dayan Parish RN Cath Lab charge nurse and was instructed to notify respiratory that we would be sending patient home with a hospital oxygen tank. Patient was then transported to [facility] via wheelchair by patricia AVERY and Di AVERY. Pt was discharged home with his home oxygen tank and carrier along with one hospital oxygen tank. Instructed patient and wife to return hospital oxygen tank on Monday to main [facility] per respiratory.

## 2020-11-11 ENCOUNTER — TELEPHONE (OUTPATIENT)
Dept: CARDIOLOGY CLINIC | Age: 80
End: 2020-11-11

## 2020-11-11 NOTE — TELEPHONE ENCOUNTER
Called provider line from pt's [facility] insurance & spoke to Karyn VALDIVIAconcerning her 11/6/2020 procedure. This nurse provided her w/more information & is sending pt's case to the Escalation Dept. for further review for possible back-dating. Routed to Aster MARTINI& Bhumi STORMfor notification.

## 2020-11-11 NOTE — TELEPHONE ENCOUNTER
Received request for additional clinicals for [facility] performed 11/6/20. Marthat had abnormal NM  showing     Severe anterior medium to large area of ischemia     Abnormal stress test     ECG portion of stress test is negative for ischemia by diagnostic criteria.     Possible afib or atrial flutter artifact post infusion , resolved within a     minute      And echo showing    Limited study due to patients body habitus lung surgery. Left ventricular function is low normal, EF is estimated at 50-55%. Mild left ventricular hypertrophy. Normal diastolic filling pattern for age. Mildly dilated left atrium. Mild tricuspid regurgitation; RVSP is 32 mmHg. No significant valvular disease noted. No evidence of pericardial effusion. Signature  Patient also has history of CAD, MVP, chest pain, diabetes and history of lung cancer. R/LHC ordered for  Extreme shortness of breath, episodes of tachycardia and afib. Please call and go over clinicals for procedure.

## 2020-11-11 NOTE — TELEPHONE ENCOUNTER
Called [facility] to provide more clinical information on this pt's RHC/LHC done on 11/06/2020. Spoke to Miquel STORM& she transferred this call to [id number] line. Spoke to [facility] [employer] & she transferred this nurse to the clinical reviewer line. Spoke to Yomi MARTINEZ& she was able to approve auth. from today's date of 11/11/2020 & valid through to 12/26/2020, w/auth#: [id number]. She was not able to approve back dated to 11/6/2020. She staed will need to call the provider# on the back of pt's insurance card. Aster MARTINInotified about above. Will call the other # later this afternoon.

## 2020-11-13 NOTE — TELEPHONE ENCOUNTER
I spoke to Kenji at [facility] and she is going to have the [address] back date on 11/6/2020 which is the same date that we have submitted to the insurance company  . We will receive Auth with ne dates with in 24-48 hrs .

## 2020-11-23 ENCOUNTER — OFFICE VISIT (OUTPATIENT)
Dept: CARDIOLOGY CLINIC | Age: 80
End: 2020-11-23
Payer: MEDICARE

## 2020-11-23 VITALS
HEART RATE: 72 BPM | DIASTOLIC BLOOD PRESSURE: 70 MMHG | HEIGHT: 72 IN | BODY MASS INDEX: 27.63 KG/M2 | WEIGHT: 204 LBS | SYSTOLIC BLOOD PRESSURE: 110 MMHG

## 2020-11-23 PROCEDURE — 1036F TOBACCO NON-USER: CPT | Performed by: INTERNAL MEDICINE

## 2020-11-23 PROCEDURE — 4040F PNEUMOC VAC/ADMIN/RCVD: CPT | Performed by: INTERNAL MEDICINE

## 2020-11-23 PROCEDURE — G8484 FLU IMMUNIZE NO ADMIN: HCPCS | Performed by: INTERNAL MEDICINE

## 2020-11-23 PROCEDURE — 1123F ACP DISCUSS/DSCN MKR DOCD: CPT | Performed by: INTERNAL MEDICINE

## 2020-11-23 PROCEDURE — 99214 OFFICE O/P EST MOD 30 MIN: CPT | Performed by: INTERNAL MEDICINE

## 2020-11-23 PROCEDURE — G8428 CUR MEDS NOT DOCUMENT: HCPCS | Performed by: INTERNAL MEDICINE

## 2020-11-23 PROCEDURE — G8417 CALC BMI ABV UP PARAM F/U: HCPCS | Performed by: INTERNAL MEDICINE

## 2020-11-23 NOTE — PATIENT INSTRUCTIONS
Please be informed that if you contact our office outside of normal business hours the physician on call cannot help with any scheduling or rescheduling issues, procedure instruction questions or any type of medication issue. We advise you for any urgent/emergency that you go to the nearest emergency room!     PLEASE CALL OUR OFFICE DURING NORMAL BUSINESS HOURS    Monday - Friday   8 am to 5 pm    [place]: [phone]    [place]:  [phone]

## 2020-11-23 NOTE — PROGRESS NOTES
CARDIOLOGY  NOTE    Chief Complaint: Chest pain and shortness of breath    HPI:   Ramon Lundy is a 79y.o. year old who has history as noted below. Mr. 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 HE had stress test during which he went into atrial tachycardia or atrial rhythm after Lexiscan infusion how ever his 30 day monitor did not show any A. fib or atrial arrhythmias except for 3-5 beat of wide-complex tachycardia which was not symptomatic he gets winded and short of breath on minimal exertion. Wife reports that he does not sleep well at night he is restless tired all the time  He was reporting that he was getting dizzy on walking . I had him walk in the hallway for about a minute during which his saturations did not drop and heart rate did not go up  While walking in the hallway today his oxygen level stayed at 90% but he got dizzy says his legs got weak when he was walking. [facility] Heart rate went up to 100s he got exhausted. He says after taking a shower he could barely function due to shortness of breath. Stress test unfortunately shows anterior wall large area of ischemia but his cardiac cath revealed patent LAD stent he does have ostial 20 to 30% stenosis. Circumflex and RCA were patent his pulmonary artery pressure was a mean of 15 mmhg and a systolic pressure of 35 wedge  was 11       He status post lung resection. He continues struggle with significant shortness of breath and episodes of tachycardia. His pulmonologist raised a valid question whether he is going into A. fib ? But his 30-day event monitor did not show any arrhythmias as mentioned above   HE is on O2 ATC .   When he was in cardiac rehab he would have frequent low blood pressures in the 80s although is not going to cardiac rehab anymore . He had LAD stent due to abnormal FFR in April 2019 .he is struggling with depression as well. CT chest shows left lung post resection changes and also loculated effusion . Kaz Rivas He had lung surgery due to lung mass and Jan 2019. He says that he has not felt good since then    Current Outpatient Medications   Medication Sig Dispense Refill    dilTIAZem (CARDIZEM CD) 120 MG extended release capsule Take 1 capsule by mouth daily 30 capsule 2    sertraline (ZOLOFT) 100 MG tablet Take 1 tablet by mouth daily      aspirin 81 MG EC tablet Take 1 tablet by mouth every morning Over The Counter 90 tablet 1    donepezil (ARICEPT) 10 MG tablet Take 1 tablet by mouth nightly 90 tablet 1    atorvastatin (LIPITOR) 20 MG tablet Take 1 tablet by mouth daily 90 tablet 1    pioglitazone (ACTOS) 45 MG tablet Take 1 tablet by mouth daily 90 tablet 1    SITagliptin (JANUVIA) 100 MG tablet Take 1 tablet by mouth daily 90 tablet 1    levothyroxine (SYNTHROID) 50 MCG tablet Take 1 tablet by mouth Daily 90 tablet 1    Cholecalciferol (VITAMIN D3) 5000 units TABS Take by mouth every morning Over The Counter       No current facility-administered medications for this visit. Allergies:   Norco [hydrocodone-acetaminophen]    Patient History:  Past Medical History:   Diagnosis Date    Arthritis     \"Back, Knees\"    Asthma     Back pain     \"At Times\"    CAD (coronary artery disease)     cardiovascular stress test 10/15/2020    possible afib or atrial flutter post infusion severe anterior medium to large ischemia     Diabetes mellitus ([facility] [address]     dx 5+ yrs ago-     echocardiogram 11/03/2020    EF 50-55% mild tricuspid regurg no significant valvular disease noted.  Glaucoma     Bilateral Eyes    H/O 24 hour EKG monitoring 11-    Predominantly SR - avg rate of 69bpm. Lowest rate 46 bpm at 0622.  Frequent isolated 5954 PVCs noted mostly in bigemenal pattern w/out complex arrhythmias. Five beat run of atrial tachycardia at 1228. No palpitations or dizziness reported in patient's daily activity report. (12-, )    H/O cardiac catheterization 06-    Noncritical diffuse CAD w/no critical stenosis. Diag borderline significant stenosis, not large enough fo percutaneous intervention. No significant angiographic progression of atherosclerosis since cath in 1997.   ( per Dr. Keke Marie at [facility]. Preserved vent function. MVP. CAD w/normal LM, 30-40% stenosis LAD, 70-80% stenosis of ostium & prox seg diag branch, normal left CX & RCA.)    H/O cardiovascular stress test 1/30/2013, 06- 1/30/2013-Normal study. Normal perfusion in all regions. EF 62%. 06--EF=60%. Normal. Normal pattern of perfusion. LV normal size. No wall abnormality. Exercise capacity good. (05-, 12-, 06-, , )    H/O chest pain     H/O chest x-ray 06-    Negative. Dx was dyspnea and CAD.  (02-)    H/O dizziness     H/O Doppler ultrasound 11-    (Carotid) Intimal thickening but no significant atherosclerotic plaque noted in right or left ICA. Doppler flow velocities w/in right and left ICA WNL.  H/O echocardiogram 1/30/2013, 05-    1/30/2013- LVSF normal. EF 50-55%. Impaired LV relaxation. Trace MR. Trace TR;   5- Normal LV size and systolic function. EF=60%. Chamber dimensions WNL. Mild concentric LV hypertrophy. Valves appear structurally normal.-OICC       H/O gastroesophageal reflux (GERD)     H/O pneumothorax Dx 1960    \"Both Sides\"    History of cardiac monitoring 12/03/2018    9 beat run of SVT, no other arrhythmias noted, primarily sinus rythym    History of complete ECG 06/06/2011    (06-, 07-, 06-, 06-)    History of exercise stress test 05/21/2019    Treadmill, Stopped due to fatigue and  Dyspnea.     History of stress test 11/07/2018    EF 61%, Normal    [place] (hard of hearing)     Bilateral Hearing Aids    Hx of CT scan of chest 11/12/2018    3 mm indeterminate solid nonobstructing endobronchial nodule of the proximal left upper lobe bronchus. Further evaluation with endoscopy is recommended. Bilateral basilar predominant intersitial changes suggestive of nonspecific interstitial pneumonitis.     Hx of Doppler echocardiogram 11/20/2018    EF55-60%,no valvular disease    Hyperlipidemia     Hypertension     Left Lung Cancer Dx 11-18    LEFT UPPER LOBECTOMY 1/28/19    Lung nodule     \"they say I have a spot on my lung- and mass in left lung so thats why doing the bronch\"( 12/7/2018)    Memory loss     MVP (mitral valve prolapse)     follows with Dr Hercules Gitelman (myocardial infarction)     \"had heart attack 30 yrs ago a mild one\"    Shortness of breath on exertion     Teeth missing     Upper And Lower    Thyroid disease     Wears dentures     Full Upper    Wears glasses     Wears hearing aid     Bilateral Ears     Past Surgical History:   Procedure Laterality Date    BRONCHOSCOPY N/A 12/7/2018    BRONCHOSCOPY/TRANSBRONCHIAL LUNG BIOPSY performed by Manuel Patel MD at [address]  [address] Or 1990's    CHOLECYSTECTOMY, LAPAROSCOPIC  2002    COLONOSCOPY  Last Done In 2006    Polyps Removed In Past    COLONOSCOPY N/A 4/9/2019    COLONOSCOPY DIAGNOSTIC performed by Alyson Amin MD at [address] [facility]      Teeth Extracted In Past    EYE SURGERY Bilateral 2000's    \"Laser For Glaucoma\"    KNEE ARTHROSCOPY Bilateral 1970's To 1990's    \"Numerous\"    KNEE SURGERY Left 1980's Or 1990's    LUNG REMOVAL, TOTAL Left 1/28/2019    THORACOTOMY LEFT UPPER LOBECTOMY ROBOTIC ASSISTED performed by Froy Peña MD at [address]  1980's     Family History   Problem Relation Age of Onset    Heart Disease Mother         Enlarged Heart    High Blood Pressure Mother     Heart Attack Father     Stroke Father  COPD Sister     Diabetes Brother      Social History     Tobacco Use    Smoking status: Never Smoker    Smokeless tobacco: Never Used   Substance Use Topics    Alcohol use: Never     Frequency: Never        Review of Systems:   · Constitutional: No Fever or Weight Loss   · Eyes: No Decreased Vision  · ENT: No Headaches, Hearing Loss or Vertigo  · Cardiovascular: as per note above   · Respiratory: as per HPI  · Gastrointestinal: No abdominal pain, appetite loss, blood in stools, constipation, diarrhea or heartburn  · Genitourinary: No dysuria, trouble voiding, or hematuria  · Musculoskeletal:  None  · Integumentary: No rash or pruritis  · Neurological: No TIA or stroke symptoms  · Psychiatric: insomnia  · Endocrine: No malaise, fatigue or temperature intolerance  · Hematologic/Lymphatic: No bleeding problems, blood clots or swollen lymph nodes  · Allergic/Immunologic: No nasal congestion or hives    Objective:      Physical Exam:  /70   Pulse 72   Ht 6' (1.829 m)   Wt 204 lb (92.5 kg)   BMI 27.67 kg/m²   Wt Readings from Last 3 Encounters:   11/23/20 204 lb (92.5 kg)   11/06/20 203 lb (92.1 kg)   11/03/20 203 lb 12.8 oz (92.4 kg)     Body mass index is 27.67 kg/m². Vitals:    11/23/20 0915   BP: 110/70   Pulse: 72        General Appearance:  No distress, conversant needs assistance getting out of chair is frail and weak in the proximal thigh muscles  Constitutional:  Well developed, Well nourished, No acute distress, Non-toxic appearance. HENT:  Normocephalic, Atraumatic, Bilateral external ears normal, Oropharynx moist, No oral exudates, Nose normal. Neck- Normal range of motion, No tenderness, Supple, No stridor,no apical-carotid delay  Eyes:  PERRL, EOMI, Conjunctiva normal, No discharge. Respiratory:  Normal breath sounds, No respiratory distress, No wheezing, No chest tenderness. ,no use of accessory muscles, NO crackles  Cardiovascular: (PMI) apex non displaced,no lifts no thrills,S1 and S2 audible, No added heart sounds, No signs of ankle edema, or volume overload, No evidence of JVD, No crackles  GI:  Bowel sounds normal, Soft, No tenderness, No masses, No gross visceromegaly   :  No costovertebral angle tenderness   Musculoskeletal:  No edema, no tenderness, no deformities. Back- no tenderness  Integument:  Well hydrated, no rash   Lymphatic:  No lymphadenopathy noted   Neurologic:  Alert & oriented x 3, CN 2-12 normal, weak proximal thigh muscle, normal sensory function, no focal deficits noted   Psychiatric:  Speech and behavior appropriate       Medical decision making and Data review:  DATA:  Lab Results   Component Value Date    TROPONINT <0.010 10/09/2019     BNP:    Lab Results   Component Value Date    PROBNP 404.4 (H) 07/02/2019     PT/INR:  No results found for: PTINR  Lab Results   Component Value Date    LABA1C 6.9 (H) 08/22/2019    LABA1C 7.1 11/02/2018     Lab Results   Component Value Date    CHOL 117 11/02/2018    TRIG 178 (H) 11/02/2018    HDL 43 11/02/2018    LDLCALC 54 03/12/2013     Lab Results   Component Value Date    ALT 14 05/31/2020    AST 19 05/31/2020     TSH:   Lab Results   Component Value Date    TSH 2.370 11/02/2018     Lab Results   Component Value Date    AST 19 05/31/2020    ALT 14 05/31/2020    BILIDIR 0.2 04/23/2019    BILITOT 0.6 05/31/2020    ALKPHOS 55 05/31/2020     Lab Results   Component Value Date    PROBNP 404.4 (H) 07/02/2019    PROBNP 140.2 02/24/2019    PROBNP 110.0 11/07/2018     Lab Results   Component Value Date    LABA1C 6.9 (H) 08/22/2019    LABA1C 7.1 11/02/2018     Lab Results   Component Value Date    WBC 8.3 11/04/2020    HGB 13.0 (L) 11/04/2020    HCT 41.3 (L) 11/04/2020     11/04/2020   echo 11*/20/18   Summary   Normal left ventricle structure and systolic function with an ejection   fraction of 55-60%. Grade I diastolic dysfunction. No significant valvular disease noted. No evidence of pericardial effusion.    Essentially unremarkable echo. Stress test 11/20/18     Sridevi Jordna .   ELLIERISE CANYON portion of stress test is negative for ischemia by diagnostic criteria.    Normal EF 61 % with normal ventricular contractility.    No infarct or ischemia noted.    Normal stress myocardial perfusion.    Normal study     holter 12/3/18  9 beat run of SVT , no other arrhythmias noted , No diary provided, primarily sinus rythym  Clinical correlation needed    Cath 4/23/19  Conclusions      Procedure Summary   Access : Radial   Indication: unstable angina Class IV   1. FFR of proximal LAD was 0.78   2. PCI to Proximal LAD using 3.25 X 38 KUMAR inflated to 18 atms ,   distal LAD towards apex has 90 % lesion   3. mid circ has 20- 30 % lesion   4. RCA is patent but small       Angiographic Findings    Diagnostic Findings      Cardiac Arteries and Lesion Findings     LMCA: Normal, Normal (no stenosis %) and No Angiographicalyl Significant  Disease. heavily calcified    LAD: Abnormal.proximal segment has long 70 to 80 % lesion, first diag is  diffusely diseased has 80% lesion , distal lad at apex has 90 % lesion ( small  vessel after apex       Lesion on Prox LAD: 80% stenosis. Culprit lesion.       Devices used       - [place] Pressure Wire. Number of passes: 1.       - 3.25 x 38 RX [facility] KUMAR. 2 inflation(s) to a max pressure of:    20 kian.     LCx: Normal, Normal (no stenosis %) and No Angiographicalyl Significant  Disease. mid circ at OM take off has 20 to 30 % stenosis, OM 1 is widely patent    RCA: Normal (no stenosis %), No Angiographicalyl Significant Disease and Mild  Lumen Irregularity. diffusely calcified small calibre vessel but widely patent    Stress test  Summary     Supervising physician Dr. wKadwo Dorman portion of stress test is negative for ischemia by diagnostic criteria.     Possible afib or atrial flutter post infusion , resolved within a minute     Severe anterior medium to large area of ischemia     Abnormal stress test     Cath 11/6/2020    Procedure Summary   Access : Radial and femoral Vein   1. LAD stent is patent , ostial LAD has 20-30 % stenosis,   2. Circ and RCA are patent   3. LVEDP was 16 mmHG   4. PA was 35/7 mean 15 mmHG ,Pcwp was 11 mmHG   5. CO is 3.9 L/MIN   6. RV was 40/3 mmHG and RA was 7 mmHG  Angiographic Findings      Diagnostic Findings    Cardiac Arteries and Lesion Findings   LMCA: Normal (no stenosis %) and No Angiographicalyl Significant  Disease. widely patent   LAD: Diffuse irregularity. LAD stents is patent , ostial LAD has 30-40 %  disease followed by aneurismal segment and lad stent which is patent, it is  similar to cath in 2019 . 2 diag are small and diffusely diseased There is a  previous stent on Prox LAD.   LCx: No Angiographicalyl Significant Disease, Mild Lumen Irregularity, Diffuse  irregularity, Abnormal and Chronic occlusion. Circ and Om are patent   RCA: Normal (no stenosis %), No Angiographicalyl Significant Disease and Mild  Lumen Irregularity. No significant disease , PDA is small                  All labs, medications and tests reviewed by myself including data and history from outside source , patient and available family . Assessment & Plan:      1. ASCVD (arteriosclerotic cardiovascular disease)    2. Angina at rest [facility])    3. Chest pain, unspecified type    4. Coronary artery disease involving native coronary artery of native heart with unstable angina pectoris (HCC)    5. Lung cancer, main bronchus, left ([address]    6. MVP (mitral valve prolapse)    7. Orthostatic hypotension    8. SOB (shortness of breath)    9. SOB (shortness of breath) on exertion       ASCVD / chest pain   Chest pain improved after PCI to LAD in April 2019 , cath November 2020 showed patent LAD stent at the images were reviewed.   Circumflex and RCA are patent right-sided pressures are normal pulmonary capillary wedge pressure normal.  I have asked his wife to discuss with primary care provider about addressing his insomnia and depression. I think his quality of life is  Getting affected a lot by his ongoing depression      Palpitations/tachycardia  He developed atrial rhythm versus artifact? Possible multifocal atrial tachycardia after Lexiscan infusion. I have started him on Cardizem he cannot tolerate higher doses due to orthostasis. For now continue Cardizem       Chronic respiratory failure  He has been oxygen dependent ever since his end of bronchial mass resection he is on 2 L of oxygen around-the-clock. Sees pulmonology followed very closely there is concerns for him developing interstitial lung disease I do not have the CT scan images but got the results . On echo and right heart cath pressures are normal       Dyslipidemia :  All available lab work was reviewed. Patient was advised to repeat lab work before next visit      Counseled extensively and medication compliance urged. We discussed that for the  prevention of ASCVD our  goal is aggressive risk modification. Patient is encouraged to exercise even a brisk walk for 30 minutes  at least 3 to 4 times a week   Various goals were discussed and questions answered. Continue current medications. Appropriate prescriptions are addressed and refills ordered. Questions answered and patient verbalizes understanding. Call for any problems, questions, or concerns. Continue all other medications of all above medical condition listed as is. Return in about 6 months (around 5/23/2021). Please note this report has been partially produced using speech recognition software and may contain errors related to that system including errors in grammar, punctuation, and spelling, as well as words and phrases that may be inappropriate.  If there are any questions or concerns please feel free to contact the dictating provider for clarification.

## 2020-11-30 RX ORDER — DILTIAZEM HYDROCHLORIDE 120 MG/1
120 CAPSULE, COATED, EXTENDED RELEASE ORAL DAILY
Qty: 30 CAPSULE | Refills: 5 | Status: SHIPPED | OUTPATIENT
Start: 2020-11-30 | End: 2021-06-07

## 2021-05-14 ENCOUNTER — HOSPITAL ENCOUNTER (INPATIENT)
Age: 81
LOS: 3 days | Discharge: HOME HEALTH | DRG: 313 | End: 2021-05-17
Attending: EMERGENCY MEDICINE | Admitting: STUDENT IN AN ORGANIZED HEALTH CARE EDUCATION/TRAINING PROGRAM
Payer: MEDICARE

## 2021-05-14 ENCOUNTER — APPOINTMENT (OUTPATIENT)
Dept: GENERAL RADIOLOGY | Age: 81
DRG: 313 | End: 2021-05-14
Payer: MEDICARE

## 2021-05-14 DIAGNOSIS — R73.9 STEROID-INDUCED HYPERGLYCEMIA: ICD-10-CM

## 2021-05-14 DIAGNOSIS — R26.2 DIFFICULTY IN WALKING: ICD-10-CM

## 2021-05-14 DIAGNOSIS — R07.9 CHEST PAIN, UNSPECIFIED TYPE: Primary | ICD-10-CM

## 2021-05-14 DIAGNOSIS — T38.0X5A STEROID-INDUCED HYPERGLYCEMIA: ICD-10-CM

## 2021-05-14 DIAGNOSIS — R73.9 HYPERGLYCEMIA: ICD-10-CM

## 2021-05-14 DIAGNOSIS — R62.7 FAILURE TO THRIVE IN ADULT: ICD-10-CM

## 2021-05-14 LAB
ALBUMIN SERPL-MCNC: 3.5 GM/DL (ref 3.4–5)
ALP BLD-CCNC: 72 IU/L (ref 40–129)
ALT SERPL-CCNC: 27 U/L (ref 10–40)
ANION GAP SERPL CALCULATED.3IONS-SCNC: 9 MMOL/L (ref 4–16)
AST SERPL-CCNC: 19 IU/L (ref 15–37)
BASOPHILS ABSOLUTE: 0.1 K/CU MM
BASOPHILS RELATIVE PERCENT: 0.6 % (ref 0–1)
BILIRUB SERPL-MCNC: 0.6 MG/DL (ref 0–1)
BILIRUBIN DIRECT: 0.3 MG/DL (ref 0–0.3)
BILIRUBIN, INDIRECT: 0.3 MG/DL (ref 0–0.7)
BUN BLDV-MCNC: 27 MG/DL (ref 6–23)
CALCIUM SERPL-MCNC: 9.6 MG/DL (ref 8.3–10.6)
CHLORIDE BLD-SCNC: 93 MMOL/L (ref 99–110)
CO2: 26 MMOL/L (ref 21–32)
CREAT SERPL-MCNC: 1.1 MG/DL (ref 0.9–1.3)
DIFFERENTIAL TYPE: ABNORMAL
EOSINOPHILS ABSOLUTE: 0.1 K/CU MM
EOSINOPHILS RELATIVE PERCENT: 0.5 % (ref 0–3)
GFR AFRICAN AMERICAN: >60 ML/MIN/1.73M2
GFR NON-AFRICAN AMERICAN: >60 ML/MIN/1.73M2
GLUCOSE BLD-MCNC: 454 MG/DL
GLUCOSE BLD-MCNC: 454 MG/DL (ref 70–99)
GLUCOSE BLD-MCNC: 502 MG/DL (ref 70–99)
GLUCOSE BLD-MCNC: 547 MG/DL
GLUCOSE BLD-MCNC: 547 MG/DL (ref 70–99)
HCT VFR BLD CALC: 42.4 % (ref 42–52)
HEMOGLOBIN: 13.8 GM/DL (ref 13.5–18)
IMMATURE NEUTROPHIL %: 1.2 % (ref 0–0.43)
LYMPHOCYTES ABSOLUTE: 1.3 K/CU MM
LYMPHOCYTES RELATIVE PERCENT: 10.6 % (ref 24–44)
MCH RBC QN AUTO: 31.5 PG (ref 27–31)
MCHC RBC AUTO-ENTMCNC: 32.5 % (ref 32–36)
MCV RBC AUTO: 96.8 FL (ref 78–100)
MONOCYTES ABSOLUTE: 0.5 K/CU MM
MONOCYTES RELATIVE PERCENT: 3.9 % (ref 0–4)
NUCLEATED RBC %: 0 %
PDW BLD-RTO: 12.7 % (ref 11.7–14.9)
PLATELET # BLD: 148 K/CU MM (ref 140–440)
PMV BLD AUTO: 12.7 FL (ref 7.5–11.1)
POTASSIUM SERPL-SCNC: 4.5 MMOL/L (ref 3.5–5.1)
PRO-BNP: 132.3 PG/ML
RBC # BLD: 4.38 M/CU MM (ref 4.6–6.2)
SEGMENTED NEUTROPHILS ABSOLUTE COUNT: 9.8 K/CU MM
SEGMENTED NEUTROPHILS RELATIVE PERCENT: 83.2 % (ref 36–66)
SODIUM BLD-SCNC: 128 MMOL/L (ref 135–145)
TOTAL IMMATURE NEUTOROPHIL: 0.14 K/CU MM
TOTAL NUCLEATED RBC: 0 K/CU MM
TOTAL PROTEIN: 6.7 GM/DL (ref 6.4–8.2)
TROPONIN T: <0.01 NG/ML
WBC # BLD: 11.8 K/CU MM (ref 4–10.5)

## 2021-05-14 PROCEDURE — 6370000000 HC RX 637 (ALT 250 FOR IP): Performed by: EMERGENCY MEDICINE

## 2021-05-14 PROCEDURE — 83036 HEMOGLOBIN GLYCOSYLATED A1C: CPT

## 2021-05-14 PROCEDURE — 1200000000 HC SEMI PRIVATE

## 2021-05-14 PROCEDURE — 71045 X-RAY EXAM CHEST 1 VIEW: CPT

## 2021-05-14 PROCEDURE — 83880 ASSAY OF NATRIURETIC PEPTIDE: CPT

## 2021-05-14 PROCEDURE — 80053 COMPREHEN METABOLIC PANEL: CPT

## 2021-05-14 PROCEDURE — 2580000003 HC RX 258: Performed by: EMERGENCY MEDICINE

## 2021-05-14 PROCEDURE — 36415 COLL VENOUS BLD VENIPUNCTURE: CPT

## 2021-05-14 PROCEDURE — 85025 COMPLETE CBC W/AUTO DIFF WBC: CPT

## 2021-05-14 PROCEDURE — 82962 GLUCOSE BLOOD TEST: CPT

## 2021-05-14 PROCEDURE — 93005 ELECTROCARDIOGRAM TRACING: CPT | Performed by: EMERGENCY MEDICINE

## 2021-05-14 PROCEDURE — 84484 ASSAY OF TROPONIN QUANT: CPT

## 2021-05-14 PROCEDURE — 99285 EMERGENCY DEPT VISIT HI MDM: CPT

## 2021-05-14 PROCEDURE — 82248 BILIRUBIN DIRECT: CPT

## 2021-05-14 RX ORDER — DILTIAZEM HYDROCHLORIDE 120 MG/1
120 CAPSULE, COATED, EXTENDED RELEASE ORAL DAILY
Status: DISCONTINUED | OUTPATIENT
Start: 2021-05-15 | End: 2021-05-16

## 2021-05-14 RX ORDER — PROMETHAZINE HYDROCHLORIDE 25 MG/1
12.5 TABLET ORAL EVERY 6 HOURS PRN
Status: DISCONTINUED | OUTPATIENT
Start: 2021-05-14 | End: 2021-05-16

## 2021-05-14 RX ORDER — ACETAMINOPHEN 650 MG/1
650 SUPPOSITORY RECTAL EVERY 6 HOURS PRN
Status: DISCONTINUED | OUTPATIENT
Start: 2021-05-14 | End: 2021-05-17 | Stop reason: HOSPADM

## 2021-05-14 RX ORDER — LIDOCAINE 4 G/G
1 PATCH TOPICAL DAILY
Status: DISCONTINUED | OUTPATIENT
Start: 2021-05-15 | End: 2021-05-17 | Stop reason: HOSPADM

## 2021-05-14 RX ORDER — SODIUM CHLORIDE, SODIUM LACTATE, POTASSIUM CHLORIDE, CALCIUM CHLORIDE 600; 310; 30; 20 MG/100ML; MG/100ML; MG/100ML; MG/100ML
INJECTION, SOLUTION INTRAVENOUS CONTINUOUS
Status: ACTIVE | OUTPATIENT
Start: 2021-05-14 | End: 2021-05-15

## 2021-05-14 RX ORDER — DULOXETIN HYDROCHLORIDE 30 MG/1
30 CAPSULE, DELAYED RELEASE ORAL DAILY
Status: DISCONTINUED | OUTPATIENT
Start: 2021-05-15 | End: 2021-05-17 | Stop reason: HOSPADM

## 2021-05-14 RX ORDER — NITROGLYCERIN 0.4 MG/1
0.4 TABLET SUBLINGUAL EVERY 5 MIN PRN
Status: DISCONTINUED | OUTPATIENT
Start: 2021-05-14 | End: 2021-05-17 | Stop reason: HOSPADM

## 2021-05-14 RX ORDER — SODIUM CHLORIDE 0.9 % (FLUSH) 0.9 %
5-40 SYRINGE (ML) INJECTION PRN
Status: DISCONTINUED | OUTPATIENT
Start: 2021-05-14 | End: 2021-05-17 | Stop reason: HOSPADM

## 2021-05-14 RX ORDER — ATORVASTATIN CALCIUM 20 MG/1
20 TABLET, FILM COATED ORAL DAILY
Status: DISCONTINUED | OUTPATIENT
Start: 2021-05-15 | End: 2021-05-17 | Stop reason: HOSPADM

## 2021-05-14 RX ORDER — DULOXETIN HYDROCHLORIDE 30 MG/1
30 CAPSULE, DELAYED RELEASE ORAL DAILY
COMMUNITY
End: 2022-02-25

## 2021-05-14 RX ORDER — ACETAMINOPHEN 325 MG/1
650 TABLET ORAL EVERY 6 HOURS PRN
Status: DISCONTINUED | OUTPATIENT
Start: 2021-05-14 | End: 2021-05-17 | Stop reason: HOSPADM

## 2021-05-14 RX ORDER — SODIUM CHLORIDE 0.9 % (FLUSH) 0.9 %
5-40 SYRINGE (ML) INJECTION EVERY 12 HOURS SCHEDULED
Status: DISCONTINUED | OUTPATIENT
Start: 2021-05-14 | End: 2021-05-17 | Stop reason: HOSPADM

## 2021-05-14 RX ORDER — POLYETHYLENE GLYCOL 3350 17 G/17G
17 POWDER, FOR SOLUTION ORAL DAILY PRN
Status: DISCONTINUED | OUTPATIENT
Start: 2021-05-14 | End: 2021-05-17 | Stop reason: HOSPADM

## 2021-05-14 RX ORDER — DONEPEZIL HYDROCHLORIDE 10 MG/1
10 TABLET, FILM COATED ORAL NIGHTLY
Status: DISCONTINUED | OUTPATIENT
Start: 2021-05-14 | End: 2021-05-17 | Stop reason: HOSPADM

## 2021-05-14 RX ORDER — LEVOTHYROXINE SODIUM 0.05 MG/1
50 TABLET ORAL DAILY
Status: DISCONTINUED | OUTPATIENT
Start: 2021-05-15 | End: 2021-05-17 | Stop reason: HOSPADM

## 2021-05-14 RX ORDER — IPRATROPIUM BROMIDE AND ALBUTEROL SULFATE 2.5; .5 MG/3ML; MG/3ML
1 SOLUTION RESPIRATORY (INHALATION) EVERY 4 HOURS PRN
Status: DISCONTINUED | OUTPATIENT
Start: 2021-05-14 | End: 2021-05-17 | Stop reason: HOSPADM

## 2021-05-14 RX ORDER — SODIUM CHLORIDE 9 MG/ML
25 INJECTION, SOLUTION INTRAVENOUS PRN
Status: DISCONTINUED | OUTPATIENT
Start: 2021-05-14 | End: 2021-05-17 | Stop reason: HOSPADM

## 2021-05-14 RX ORDER — DEXTROSE MONOHYDRATE 50 MG/ML
100 INJECTION, SOLUTION INTRAVENOUS PRN
Status: DISCONTINUED | OUTPATIENT
Start: 2021-05-14 | End: 2021-05-17 | Stop reason: HOSPADM

## 2021-05-14 RX ORDER — ASPIRIN 81 MG/1
81 TABLET ORAL EVERY MORNING
Status: DISCONTINUED | OUTPATIENT
Start: 2021-05-15 | End: 2021-05-17 | Stop reason: HOSPADM

## 2021-05-14 RX ORDER — 0.9 % SODIUM CHLORIDE 0.9 %
500 INTRAVENOUS SOLUTION INTRAVENOUS ONCE
Status: COMPLETED | OUTPATIENT
Start: 2021-05-14 | End: 2021-05-14

## 2021-05-14 RX ORDER — DEXTROSE MONOHYDRATE 25 G/50ML
12.5 INJECTION, SOLUTION INTRAVENOUS PRN
Status: DISCONTINUED | OUTPATIENT
Start: 2021-05-14 | End: 2021-05-17 | Stop reason: HOSPADM

## 2021-05-14 RX ORDER — NICOTINE POLACRILEX 4 MG
15 LOZENGE BUCCAL PRN
Status: DISCONTINUED | OUTPATIENT
Start: 2021-05-14 | End: 2021-05-17 | Stop reason: HOSPADM

## 2021-05-14 RX ORDER — PREDNISONE 10 MG/1
10 TABLET ORAL DAILY
Status: ON HOLD | COMMUNITY
End: 2021-05-17 | Stop reason: HOSPADM

## 2021-05-14 RX ADMIN — INSULIN LISPRO 18 UNITS: 100 INJECTION, SOLUTION INTRAVENOUS; SUBCUTANEOUS at 22:29

## 2021-05-14 RX ADMIN — SODIUM CHLORIDE 500 ML: 9 INJECTION, SOLUTION INTRAVENOUS at 20:31

## 2021-05-14 ASSESSMENT — ENCOUNTER SYMPTOMS
DIARRHEA: 0
SHORTNESS OF BREATH: 1
WHEEZING: 0
CHEST TIGHTNESS: 1
VOMITING: 0
SORE THROAT: 0
RHINORRHEA: 0
ANAL BLEEDING: 0
COLOR CHANGE: 0
COUGH: 1
BLOOD IN STOOL: 0
NAUSEA: 0
ABDOMINAL PAIN: 0
CONSTIPATION: 0

## 2021-05-14 ASSESSMENT — PAIN SCALES - GENERAL
PAINLEVEL_OUTOF10: 0
PAINLEVEL_OUTOF10: 6

## 2021-05-14 NOTE — ED PROVIDER NOTES
CHIEF COMPLAINT  Chief Complaint   Patient presents with    Hyperglycemia     prednisone x2 weeks,  per EMS    Rib Pain     left, fall 2 days ago       HPI  Shady Flores is a 79 y.o. male with history of pulmonary fibrosis, previous lung cancer status post left lung lobectomy who presents with chest pain that he relates to his chronic fibrosis but it has been worsened over the past 2 days. He is also been falling frequently, unsteady with a walker. His blood sugars been poorly controlled, he is recently been on a steroid burst by his pulmonologist as he has upcoming PFTs. Denies any fevers, he has baseline cough. Is on baseline 4 to 5 L oxygen. He denies any leg swelling or history of DVT or PE. He has persistent signs and symptoms at this time including thirst, increased frequency of urination.       REVIEW OF SYSTEMS  Review of Systems   History obtained from chart review, the patient and family at bedside  General ROS: positive for  - fatigue  Ophthalmic ROS: negative for - decreased vision or double vision  ENT ROS: negative for - headaches  Hematological and Lymphatic ROS: negative for - bleeding problems or bruising  Endocrine ROS: negative for - unexpected weight changes  Respiratory ROS: positive for - cough and shortness of breath  Cardiovascular ROS: no chest pain or dyspnea on exertion  Gastrointestinal ROS: no abdominal pain, change in bowel habits, or black or bloody stools  Genito-Urinary ROS: no dysuria, trouble voiding, or hematuria  Musculoskeletal ROS: negative for - joint pain or joint stiffness  Neurological ROS: no TIA or stroke symptoms      PAST MEDICAL HISTORY  Past Medical History:   Diagnosis Date    Arthritis     \"Back, Knees\"    Asthma     Back pain     \"At Times\"    CAD (coronary artery disease)     cardiovascular stress test 10/15/2020    possible afib or atrial flutter post infusion severe anterior medium to large ischemia     Diabetes mellitus ([facility] [address]     dx 5+ yrs ago-     echocardiogram 11/03/2020    EF 50-55% mild tricuspid regurg no significant valvular disease noted.  Glaucoma     Bilateral Eyes    H/O 24 hour EKG monitoring 11-    Predominantly SR - avg rate of 69bpm. Lowest rate 46 bpm at 0622. Frequent isolated 5954 PVCs noted mostly in bigemenal pattern w/out complex arrhythmias. Five beat run of atrial tachycardia at 1228. No palpitations or dizziness reported in patient's daily activity report. (12-, )    H/O cardiac catheterization 06-    Noncritical diffuse CAD w/no critical stenosis. Diag borderline significant stenosis, not large enough fo percutaneous intervention. No significant angiographic progression of atherosclerosis since cath in 1997.   ( per Dr. Dez Link at [facility]. Preserved vent function. MVP. CAD w/normal LM, 30-40% stenosis LAD, 70-80% stenosis of ostium & prox seg diag branch, normal left CX & RCA.)    H/O cardiovascular stress test 1/30/2013, 06- 1/30/2013-Normal study. Normal perfusion in all regions. EF 62%. 06--EF=60%. Normal. Normal pattern of perfusion. LV normal size. No wall abnormality. Exercise capacity good. (05-, 12-, 06-, , )    H/O chest pain     H/O chest x-ray 06-    Negative. Dx was dyspnea and CAD.  (02-)    H/O dizziness     H/O Doppler ultrasound 11-    (Carotid) Intimal thickening but no significant atherosclerotic plaque noted in right or left ICA. Doppler flow velocities w/in right and left ICA WNL.  H/O echocardiogram 1/30/2013, 05-    1/30/2013- LVSF normal. EF 50-55%. Impaired LV relaxation. Trace MR. Trace TR;   5- Normal LV size and systolic function. EF=60%. Chamber dimensions WNL. Mild concentric LV hypertrophy.  Valves appear structurally normal.-OICC       H/O gastroesophageal reflux (GERD)     H/O pneumothorax Dx 1960    \"Both Sides\"    History of cardiac monitoring 12/03/2018 9 beat run of SVT, no other arrhythmias noted, primarily sinus rythym    History of complete ECG 06/06/2011    (06-, 07-, 06-, 06-)    History of exercise stress test 05/21/2019    Treadmill, Stopped due to fatigue and  Dyspnea.  History of stress test 11/07/2018    EF 61%, Normal    [place] (hard of hearing)     Bilateral Hearing Aids    Hx of CT scan of chest 11/12/2018    3 mm indeterminate solid nonobstructing endobronchial nodule of the proximal left upper lobe bronchus. Further evaluation with endoscopy is recommended. Bilateral basilar predominant intersitial changes suggestive of nonspecific interstitial pneumonitis.     Hx of Doppler echocardiogram 11/20/2018    EF55-60%,no valvular disease    Hyperlipidemia     Hypertension     Left Lung Cancer Dx 11-18    LEFT UPPER LOBECTOMY 1/28/19    Lung nodule     \"they say I have a spot on my lung- and mass in left lung so thats why doing the bronch\"( 12/7/2018)    Memory loss     MVP (mitral valve prolapse)     follows with Dr dE Ramos (myocardial infarction)     \"had heart attack 30 yrs ago a mild one\"    Shortness of breath on exertion     Teeth missing     Upper And Lower    Thyroid disease     Wears dentures     Full Upper    Wears glasses     Wears hearing aid     Bilateral Ears       FAMILY HISTORY  Family History   Problem Relation Age of Onset    Heart Disease Mother         Enlarged Heart    High Blood Pressure Mother     Heart Attack Father     Stroke Father     COPD Sister     Diabetes Brother        SOCIAL HISTORY  Social History     Socioeconomic History    Marital status:      Spouse name: None    Number of children: 3    Years of education: None    Highest education level: None   Occupational History    Occupation: Retired     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   Social Needs    Financial resource strain: None    Food insecurity     Worry: None     Inability: None   [place] Transportation needs     Medical: None     Non-medical: None   Tobacco Use    Smoking status: Never Smoker    Smokeless tobacco: Never Used   Substance and Sexual Activity    Alcohol use: Never     Frequency: Never    Drug use: No    Sexual activity: Not Currently     Partners: Female   Lifestyle    Physical activity     Days per week: None     Minutes per session: None    Stress: None   Relationships    Social connections     Talks on phone: None     Gets together: None     Attends Buddhist service: None     Active member of club or organization: None     Attends meetings of clubs or organizations: None     Relationship status: None    Intimate partner violence     Fear of current or ex partner: None     Emotionally abused: None     Physically abused: None     Forced sexual activity: None   Other Topics Concern    None   Social History Narrative    None       SURGICAL HISTORY  Past Surgical History:   Procedure Laterality Date    BRONCHOSCOPY N/A 12/7/2018    BRONCHOSCOPY/TRANSBRONCHIAL LUNG BIOPSY performed by Maryan Ward MD at [address]  [address] 1990's   [address] [facility] [place]  2002    COLONOSCOPY  Last Done In 2006    Polyps Removed In Past    COLONOSCOPY N/A 4/9/2019    COLONOSCOPY DIAGNOSTIC performed by Olayinka Rubio MD at [address] [place] [address]      Teeth Extracted In Past    EYE SURGERY Bilateral 2000's    \"Laser For Glaucoma\"    KNEE ARTHROSCOPY Bilateral 1970's To 1990's    \"Numerous\"    KNEE SURGERY Left 1980's Or 1990's    LUNG REMOVAL, TOTAL Left 1/28/2019    THORACOTOMY LEFT UPPER LOBECTOMY ROBOTIC ASSISTED performed by Armida Esteves MD at [address] [facility]  No current facility-administered medications on file prior to encounter.       Current Outpatient Medications on File Prior to Encounter   Medication Sig Dispense Refill    predniSONE (DELTASONE) 10 MG tablet Take 10 mg by mouth daily 3 tablets for 7 days      DULoxetine (CYMBALTA) 30 MG extended release capsule Take 30 mg by mouth daily      dilTIAZem (CARDIZEM CD) 120 MG extended release capsule Take 1 capsule by mouth daily 30 capsule 5    aspirin 81 MG EC tablet Take 1 tablet by mouth every morning Over The Counter 90 tablet 1    donepezil (ARICEPT) 10 MG tablet Take 1 tablet by mouth nightly 90 tablet 1    atorvastatin (LIPITOR) 20 MG tablet Take 1 tablet by mouth daily 90 tablet 1    SITagliptin (JANUVIA) 100 MG tablet Take 1 tablet by mouth daily 90 tablet 1    levothyroxine (SYNTHROID) 50 MCG tablet Take 1 tablet by mouth Daily 90 tablet 1    Cholecalciferol (VITAMIN D3) 5000 units TABS Take by mouth every morning Over The Counter      sertraline (ZOLOFT) 100 MG tablet Take 1 tablet by mouth daily      pioglitazone (ACTOS) 45 MG tablet Take 1 tablet by mouth daily 90 tablet 1         ALLERGIES  Allergies   Allergen Reactions    Norco [Hydrocodone-Acetaminophen] Other (See Comments)     Drops BP. PHYSICAL EXAM  VITAL SIGNS: BP (!) 177/91   Pulse 79   Temp 97.6 °F (36.4 °C)   Resp 20   Ht 6' (1.829 m)   Wt 194 lb (88 kg)   SpO2 100%   BMI 26.31 kg/m²   Constitutional: Resting in bed, chronically unwell, family at bedside  HENT: Normocephalic, Atraumatic, Bilateral external ears normal, Oropharynx moist, No oral exudates, Nose normal.   Eyes: PERRL, EOMI, Conjunctiva normal, No discharge. Neck: Normal range of motion, Supple, No stridor. Cardiovascular: Normal heart rate, Normal rhythm, No murmurs, No rubs, No gallops. Thorax & Lungs: Diminished diffuse breath sounds, No respiratory distress, No wheezing, No chest tenderness. Abdomen: Bowel sounds normal, Soft, No tenderness, no guarding, no rebound, No masses, No pulsatile masses. Skin: Warm, Dry, No erythema, No rash. Extremities: Intact distal pulses, No edema, No tenderness, No cyanosis, No clubbing.    Musculoskeletal: Good gross range of motion in all major joints. No major deformities noted. Neurologic: Alert & oriented x 3, Normal gross motor function, Normal gross sensory function, No focal deficits noted. Psychiatric: Affect flat    EKG  EKG Interpretation    Interpreted by emergency department physician from May 14 at [address] [facility],  [address]: normal sinus   Rate: normal  Axis: left  Ectopy: none  Conduction: normal  ST Segments: no acute change  T Waves: no acute change  Q Waves: none    Clinical Impression: Normal sinus rhythm with a rate of 82 and a left axis, no signs of ectopy or ischemia at this time. [facility]      RADIOLOGY/PROCEDURES/LABS  Last Imaging results   XR CHEST PORTABLE   Final Result   Chronic bilateral pulmonary disease. No definitive portable chest x-ray   evidence of superimposed acute process.              Imaging reviewed by myself    Labs Reviewed   CBC WITH AUTO DIFFERENTIAL - Abnormal; Notable for the following components:       Result Value    WBC 11.8 (*)     RBC 4.38 (*)     MCH 31.5 (*)     MPV 12.7 (*)     Segs Relative 83.2 (*)     Lymphocytes % 10.6 (*)     Immature Neutrophil % 1.2 (*)     All other components within normal limits   BASIC METABOLIC PANEL W/ REFLEX TO MG FOR LOW K - Abnormal; Notable for the following components:    Sodium 128 (*)     Chloride 93 (*)     BUN 27 (*)     Glucose 502 (*)     All other components within normal limits   POCT GLUCOSE - Abnormal; Notable for the following components:    POC Glucose 547 (*)     All other components within normal limits   POCT GLUCOSE - Abnormal; Notable for the following components:    POC Glucose 454 (*)     All other components within normal limits   POCT GLUCOSE - Normal   POCT GLUCOSE - Normal   HEPATIC FUNCTION PANEL   BRAIN NATRIURETIC PEPTIDE   TROPONIN   HEMOGLOBIN A1C   TROPONIN   TROPONIN   CBC   BASIC METABOLIC PANEL W/ REFLEX TO MG FOR LOW K   POCT GLUCOSE   POCT GLUCOSE       Medications   0.9 % sodium chloride bolus (0 mLs Intravenous Stopped 5/14/21 2232)   insulin lispro (HUMALOG) injection vial 18 Units (18 Units Subcutaneous Given 5/14/21 2229)       COURSE & MEDICAL DECISION MAKING  Pertinent Labs & Imaging studies reviewed. (See chart for details)    58-year-old male presents with chest pain, instability on his feet, elevated blood sugars. His blood sugars are likely elevated secondary to steroid induced hyperglycemia as he does not usually have elevated blood sugars and usually his hemoglobin A1c is less than 7. He was treated with IV fluids and Humalog. As he is insulin naïve, we were using the Humalog sparingly and he did have some reduction. He will require hospitalization for his unsteadiness, he likely has pseudohyponatremia secondary to elevated glucose. I do not visualize any rib fracture or pneumothorax but based on his chest pain, history of fibrosis I am concerned for pulmonary hypertension or fibrosis as the cause of his signs and symptoms. Lower clinical suspicion for pneumonia or PE. Will be hospitalized to facilitate additional evaluation including trending of troponins and further evaluation. No signs of ischemia on EKG. FINAL IMPRESSION  Problem List Items Addressed This Visit     * (Principal) Chest pain - Primary      Other Visit Diagnoses     Hyperglycemia        Steroid-induced hyperglycemia        Failure to thrive in adult        Difficulty in walking          1.    2.   3.    Patient gave me permission to discuss medical history, care, and plan with those present in the room.   Electronically signed by: Yamileth Ellis MD, 5/14/2021  MD Yamileth Caldera MD  05/14/21 0254

## 2021-05-14 NOTE — ED NOTES
Bed: ED-20  Expected date:   Expected time:   Means of arrival:   Comments:  101 Yuridia Levine RN  05/14/21 1929

## 2021-05-15 LAB
ANION GAP SERPL CALCULATED.3IONS-SCNC: 9 MMOL/L (ref 4–16)
BUN BLDV-MCNC: 24 MG/DL (ref 6–23)
CALCIUM SERPL-MCNC: 8.9 MG/DL (ref 8.3–10.6)
CHLORIDE BLD-SCNC: 98 MMOL/L (ref 99–110)
CO2: 29 MMOL/L (ref 21–32)
CREAT SERPL-MCNC: 1 MG/DL (ref 0.9–1.3)
ESTIMATED AVERAGE GLUCOSE: 209 MG/DL
GFR AFRICAN AMERICAN: >60 ML/MIN/1.73M2
GFR NON-AFRICAN AMERICAN: >60 ML/MIN/1.73M2
GLUCOSE BLD-MCNC: 145 MG/DL (ref 70–99)
GLUCOSE BLD-MCNC: 181 MG/DL (ref 70–99)
GLUCOSE BLD-MCNC: 183 MG/DL (ref 70–99)
GLUCOSE BLD-MCNC: 192 MG/DL (ref 70–99)
GLUCOSE BLD-MCNC: 205 MG/DL (ref 70–99)
GLUCOSE BLD-MCNC: 278 MG/DL (ref 70–99)
GLUCOSE BLD-MCNC: 332 MG/DL (ref 70–99)
GLUCOSE BLD-MCNC: 346 MG/DL (ref 70–99)
HBA1C MFR BLD: 8.9 % (ref 4.2–6.3)
HCT VFR BLD CALC: 39.4 % (ref 42–52)
HEMOGLOBIN: 12.8 GM/DL (ref 13.5–18)
MCH RBC QN AUTO: 30.8 PG (ref 27–31)
MCHC RBC AUTO-ENTMCNC: 32.5 % (ref 32–36)
MCV RBC AUTO: 94.9 FL (ref 78–100)
PDW BLD-RTO: 12.4 % (ref 11.7–14.9)
PLATELET # BLD: 172 K/CU MM (ref 140–440)
PMV BLD AUTO: 11 FL (ref 7.5–11.1)
POTASSIUM SERPL-SCNC: 4.3 MMOL/L (ref 3.5–5.1)
RBC # BLD: 4.15 M/CU MM (ref 4.6–6.2)
SODIUM BLD-SCNC: 136 MMOL/L (ref 135–145)
TROPONIN T: <0.01 NG/ML
TROPONIN T: <0.01 NG/ML
WBC # BLD: 12 K/CU MM (ref 4–10.5)

## 2021-05-15 PROCEDURE — 85027 COMPLETE CBC AUTOMATED: CPT

## 2021-05-15 PROCEDURE — 1200000000 HC SEMI PRIVATE

## 2021-05-15 PROCEDURE — 96372 THER/PROPH/DIAG INJ SC/IM: CPT

## 2021-05-15 PROCEDURE — 6360000002 HC RX W HCPCS: Performed by: STUDENT IN AN ORGANIZED HEALTH CARE EDUCATION/TRAINING PROGRAM

## 2021-05-15 PROCEDURE — 84484 ASSAY OF TROPONIN QUANT: CPT

## 2021-05-15 PROCEDURE — 99222 1ST HOSP IP/OBS MODERATE 55: CPT | Performed by: INTERNAL MEDICINE

## 2021-05-15 PROCEDURE — 36415 COLL VENOUS BLD VENIPUNCTURE: CPT

## 2021-05-15 PROCEDURE — 6370000000 HC RX 637 (ALT 250 FOR IP): Performed by: STUDENT IN AN ORGANIZED HEALTH CARE EDUCATION/TRAINING PROGRAM

## 2021-05-15 PROCEDURE — 96374 THER/PROPH/DIAG INJ IV PUSH: CPT

## 2021-05-15 PROCEDURE — 82962 GLUCOSE BLOOD TEST: CPT

## 2021-05-15 PROCEDURE — 2580000003 HC RX 258: Performed by: STUDENT IN AN ORGANIZED HEALTH CARE EDUCATION/TRAINING PROGRAM

## 2021-05-15 PROCEDURE — 94761 N-INVAS EAR/PLS OXIMETRY MLT: CPT

## 2021-05-15 PROCEDURE — 2700000000 HC OXYGEN THERAPY PER DAY

## 2021-05-15 PROCEDURE — 80048 BASIC METABOLIC PNL TOTAL CA: CPT

## 2021-05-15 RX ORDER — LANOLIN ALCOHOL/MO/W.PET/CERES
3 CREAM (GRAM) TOPICAL NIGHTLY PRN
Status: DISCONTINUED | OUTPATIENT
Start: 2021-05-15 | End: 2021-05-17 | Stop reason: HOSPADM

## 2021-05-15 RX ORDER — KETOROLAC TROMETHAMINE 30 MG/ML
15 INJECTION, SOLUTION INTRAMUSCULAR; INTRAVENOUS EVERY 6 HOURS PRN
Status: DISCONTINUED | OUTPATIENT
Start: 2021-05-15 | End: 2021-05-17 | Stop reason: HOSPADM

## 2021-05-15 RX ADMIN — PROMETHAZINE HYDROCHLORIDE 12.5 MG: 25 TABLET ORAL at 23:09

## 2021-05-15 RX ADMIN — SODIUM CHLORIDE, PRESERVATIVE FREE 10 ML: 5 INJECTION INTRAVENOUS at 09:55

## 2021-05-15 RX ADMIN — INSULIN LISPRO 4 UNITS: 100 INJECTION, SOLUTION INTRAVENOUS; SUBCUTANEOUS at 00:27

## 2021-05-15 RX ADMIN — INSULIN LISPRO 1 UNITS: 100 INJECTION, SOLUTION INTRAVENOUS; SUBCUTANEOUS at 09:49

## 2021-05-15 RX ADMIN — SODIUM CHLORIDE, PRESERVATIVE FREE 10 ML: 5 INJECTION INTRAVENOUS at 20:36

## 2021-05-15 RX ADMIN — LEVOTHYROXINE SODIUM 50 MCG: 0.05 TABLET ORAL at 09:47

## 2021-05-15 RX ADMIN — SODIUM CHLORIDE, PRESERVATIVE FREE 10 ML: 5 INJECTION INTRAVENOUS at 00:23

## 2021-05-15 RX ADMIN — INSULIN LISPRO 4 UNITS: 100 INJECTION, SOLUTION INTRAVENOUS; SUBCUTANEOUS at 20:37

## 2021-05-15 RX ADMIN — ASPIRIN 81 MG: 81 TABLET, COATED ORAL at 09:48

## 2021-05-15 RX ADMIN — INSULIN LISPRO 2 UNITS: 100 INJECTION, SOLUTION INTRAVENOUS; SUBCUTANEOUS at 23:13

## 2021-05-15 RX ADMIN — INSULIN LISPRO 1 UNITS: 100 INJECTION, SOLUTION INTRAVENOUS; SUBCUTANEOUS at 12:09

## 2021-05-15 RX ADMIN — ENOXAPARIN SODIUM 40 MG: 40 INJECTION SUBCUTANEOUS at 09:49

## 2021-05-15 RX ADMIN — DULOXETINE HYDROCHLORIDE 30 MG: 30 CAPSULE, DELAYED RELEASE ORAL at 09:48

## 2021-05-15 RX ADMIN — DILTIAZEM HYDROCHLORIDE 120 MG: 120 CAPSULE, COATED, EXTENDED RELEASE ORAL at 09:48

## 2021-05-15 RX ADMIN — KETOROLAC TROMETHAMINE 15 MG: 30 INJECTION, SOLUTION INTRAMUSCULAR; INTRAVENOUS at 23:09

## 2021-05-15 RX ADMIN — INSULIN LISPRO 1 UNITS: 100 INJECTION, SOLUTION INTRAVENOUS; SUBCUTANEOUS at 04:05

## 2021-05-15 RX ADMIN — SODIUM CHLORIDE, POTASSIUM CHLORIDE, SODIUM LACTATE AND CALCIUM CHLORIDE: 600; 310; 30; 20 INJECTION, SOLUTION INTRAVENOUS at 00:22

## 2021-05-15 RX ADMIN — MELATONIN TAB 3 MG 3 MG: 3 TAB at 23:09

## 2021-05-15 RX ADMIN — INSULIN LISPRO 3 UNITS: 100 INJECTION, SOLUTION INTRAVENOUS; SUBCUTANEOUS at 16:39

## 2021-05-15 RX ADMIN — DONEPEZIL HYDROCHLORIDE 10 MG: 10 TABLET, FILM COATED ORAL at 20:36

## 2021-05-15 RX ADMIN — ATORVASTATIN CALCIUM 20 MG: 20 TABLET, FILM COATED ORAL at 09:48

## 2021-05-15 ASSESSMENT — PAIN SCALES - GENERAL
PAINLEVEL_OUTOF10: 8
PAINLEVEL_OUTOF10: 5
PAINLEVEL_OUTOF10: 0

## 2021-05-15 ASSESSMENT — PAIN DESCRIPTION - PROGRESSION
CLINICAL_PROGRESSION: NOT CHANGED

## 2021-05-15 ASSESSMENT — PAIN DESCRIPTION - FREQUENCY: FREQUENCY: INTERMITTENT

## 2021-05-15 ASSESSMENT — PAIN DESCRIPTION - LOCATION: LOCATION: RIB CAGE;CHEST

## 2021-05-15 ASSESSMENT — PAIN DESCRIPTION - ONSET: ONSET: ON-GOING

## 2021-05-15 ASSESSMENT — PAIN - FUNCTIONAL ASSESSMENT: PAIN_FUNCTIONAL_ASSESSMENT: PREVENTS OR INTERFERES SOME ACTIVE ACTIVITIES AND ADLS

## 2021-05-15 ASSESSMENT — PAIN DESCRIPTION - PAIN TYPE: TYPE: ACUTE PAIN

## 2021-05-15 ASSESSMENT — PAIN DESCRIPTION - ORIENTATION: ORIENTATION: LEFT

## 2021-05-15 ASSESSMENT — PAIN DESCRIPTION - DESCRIPTORS: DESCRIPTORS: SHARP

## 2021-05-15 NOTE — H&P
History and Physical      Name:  Carmen Baer /Age/Sex: 1940  (79 y.o. male)   MRN & CSN:  [id number] & [id number] Admission Date/Time: 2021  7:15 PM   Location:  ED20/ED-20 PCP: Sabina Sullivan 112 Day: 1    Assessment and Plan:   Carmen Baer is a 79 y.o.  male  who presents with Chest pain    Chest pain, rule out acute coronary syndrome, with h/o coronary artery disease  Mechanical fall  -Admit to inpatient services with telemetry and continuous pulse ox  -Status-post diagnostic cardiac cath on 2020 with LAD stent patent, circumflex and RCA patent, LVEDP 16. Recommended risk factor modification at that time. 2D echo on 11/3/2020 showed EF 50 to 55%, RVSP 32. Nuclear stress test 10/15/2020 was negative for ischemia at that time.  -Troponin <0.010 in ED, Cycle troponin x2 q6  -Continue home ASA, and statin  -SL nitro prn  -N.p.o.  -Holding off on Lexiscan stress test and 2D Echo given recent work-up  -Consult cardiology, appreciate recommendations    Chronic hypoxic respiratory failure secondary to pulmonary fibrosis  History of left lung cancer, status post resection  -Patient recently on prednisone as outpatient, likely caused hypoglycemic episode  -Continue home 5 L per nasal cannula O2  -DuoNebs every 4 hours as needed  -Family has been discussing palliative care as an outpatient. Diabetes mellitus type 2 with hyperglycemia  Pseudohyponatremia  -Blood glucose 547 on presentation  -Na 128, K 4.5, CO2 26 on presentation  -Anion gap 9 on presentation  -Holding home oral medication, n.p.o., low n.p.o. SSI + BG checks ACHS, hypoglycemic protocol, and target -180  -Patient was given 500 cc bolus of normal saline in ED. We will continue with 100 mL an hour of lactated Ringer for 10 hours. Other chronic medical conditions:  Continue all home meds except stated above or contraindicated.   Depression  HTN  HLD  Hypothyroidism  Hard of hearing  Overweight    Diet Diet NPO Effective Now   DVT Prophylaxis [x] Lovenox, []  Heparin, [] SCDs, [] Ambulation   GI Prophylaxis [] PPI,  [] H2 Blocker,  [] Carafate,  [] Diet/Tube Feeds   Code Status Prior   Disposition Patient requires continued admission due to Chest pain   MDM [] Low, [] Moderate,[x]  High  Patient's risk as above due to acuity of condition with potential for decompensation. History of Present Illness:     Chief Complaint: Chest pain    Merry Ware is a 79 y.o.  male with a reviewed and noncontributory family history and a PMH as stated above, who presents with the complaint of chest pain. Onset was 12 hours ago, with worsening course since that time. The patient describes the pain as intermittent, sharp in nature, radiates to the left shoulder. Patient rates pain as a 10/10 at maximal intensity. Associated symptoms are chronic dyspnea. Aggravating factors are exercise and walking. Alleviating factors are: rest. Patient's cardiac risk factors are advanced age (older than 54 for men, 72 for women), diabetes mellitus, dyslipidemia, hypertension, male gender and sedentary lifestyle. Recent cardiac testing includes: Diagnostic cardiac cath on 11/6/2020 with LAD stent patent, circumflex and RCA patent, LVEDP 16. Recommended risk factor modification at that time. 2D echo on 11/3/2020 showed EF 50 to 55%, RVSP 32. Nuclear stress test 10/15/2020 was negative for ischemia at that time. Patient does endorse mechanical fall approximately 2 days ago, states he fell on his left side and thinks part of the chest pain could be related to hitting that side. Does endorse pulmonary fibrosis and left lung cancer status post resection causing decreased ADLs. Patient is unable to ambulate without significant ALVES. Patient states he does occasionally get chest pain that is related to his underlying lung disease but states this pain is different.   Patient does endorse recently being on prednisone for his underlying lung disease and states that his sugars have been running \"high. \"  Patient states he is urinating approximately every hour and constantly feels thirsty. Denies being on insulin at home. Otherwise patient denies diaphoresis, fever, chills, headache, worsening shortness of breath or cough, palpitations, abdominal pain, nausea, vomiting, diarrhea, dark tarry stools, blood per rectum, dysuria, or hematuria. Discussed case with ED provider. ROS:   Review of Systems   Constitutional: Positive for fatigue. Negative for appetite change, diaphoresis and fever. HENT: Positive for hearing loss (Chronic). Negative for congestion, rhinorrhea and sore throat. Eyes: Negative for visual disturbance. Respiratory: Positive for cough (At baseline), chest tightness and shortness of breath (At baseline). Negative for wheezing. Cardiovascular: Positive for chest pain. Negative for palpitations and leg swelling. Gastrointestinal: Negative for abdominal pain, anal bleeding, blood in stool, constipation, diarrhea, nausea and vomiting. Endocrine: Positive for polydipsia and polyuria. Genitourinary: Negative for dysuria, frequency, hematuria and urgency. Musculoskeletal: Positive for gait problem. Negative for arthralgias. Skin: Negative for color change and rash. Neurological: Negative for dizziness, seizures, syncope, facial asymmetry, speech difficulty, weakness, light-headedness, numbness and headaches. Psychiatric/Behavioral: Negative for confusion. Objective:   No intake or output data in the 24 hours ending 05/14/21 2159   Vitals:   Vitals:    05/14/21 2033   BP: (!) 156/91   Pulse: 81   Resp: 18   Temp:    SpO2: 99%     BP (!) 156/91   Pulse 81   Temp 97.7 °F (36.5 °C) (Oral)   Resp 18   Ht 6' (1.829 m)   Wt 197 lb (89.4 kg)   SpO2 99%   BMI 26.72 kg/m²   Physical Exam:   Physical Exam  Vitals signs and nursing note reviewed.    Constitutional:       General: He is awake. He is not in acute distress. Appearance: Normal appearance. He is overweight. He is not ill-appearing, toxic-appearing or diaphoretic. Interventions: He is not intubated. Nasal cannula in place. HENT:      Head: Atraumatic. Right Ear: External ear normal. Decreased hearing noted. Left Ear: External ear normal. Decreased hearing noted. Nose: Nose normal. No rhinorrhea. Mouth/Throat:      Mouth: Mucous membranes are moist.   Eyes:      General: No scleral icterus. Conjunctiva/sclera: Conjunctivae normal.      Pupils: Pupils are equal, round, and reactive to light. Neck:      Musculoskeletal: Neck supple. Cardiovascular:      Rate and Rhythm: Normal rate and regular rhythm. Pulses: Normal pulses. Heart sounds: Normal heart sounds. No murmur. No gallop. Pulmonary:      Effort: Pulmonary effort is normal. No tachypnea or respiratory distress. He is not intubated. Breath sounds: Examination of the left-upper field reveals decreased breath sounds. Examination of the left-middle field reveals decreased breath sounds. Examination of the left-lower field reveals decreased breath sounds. Decreased breath sounds present. No wheezing, rhonchi or rales. Chest:      Chest wall: Tenderness (Left-sided sternal tenderness at costochondral junction on palpation) present. Abdominal:      General: Bowel sounds are normal. There is no distension. Palpations: Abdomen is soft. Tenderness: There is no abdominal tenderness. There is no guarding or rebound. Negative signs include Branch's sign and Rovsing's sign. Musculoskeletal: Normal range of motion. Right lower leg: No edema. Left lower leg: No edema. Skin:     General: Skin is warm and dry. Capillary Refill: Capillary refill takes less than 2 seconds. Neurological:      General: No focal deficit present. Mental Status: He is alert and oriented to person, place, and time.  Mental status is at baseline. Cranial Nerves: No dysarthria or facial asymmetry. Motor: No tremor or seizure activity. Psychiatric:         Attention and Perception: He is attentive. Mood and Affect: Mood is not anxious. Speech: Speech normal. He is communicative. Speech is not slurred. Behavior: Behavior is cooperative. Past Medical History:      Past Medical History:   Diagnosis Date    Arthritis     \"Back, Knees\"    Asthma     Back pain     \"At Times\"    CAD (coronary artery disease)     cardiovascular stress test 10/15/2020    possible afib or atrial flutter post infusion severe anterior medium to large ischemia     Diabetes mellitus ([address]     dx 5+ yrs ago-     echocardiogram 11/03/2020    EF 50-55% mild tricuspid regurg no significant valvular disease noted.  Glaucoma     Bilateral Eyes    H/O 24 hour EKG monitoring 11-    Predominantly SR - avg rate of 69bpm. Lowest rate 46 bpm at 0622. Frequent isolated 5954 PVCs noted mostly in bigemenal pattern w/out complex arrhythmias. Five beat run of atrial tachycardia at 1228. No palpitations or dizziness reported in patient's daily activity report. (12-, )    H/O cardiac catheterization 06-    Noncritical diffuse CAD w/no critical stenosis. Diag borderline significant stenosis, not large enough fo percutaneous intervention. No significant angiographic progression of atherosclerosis since cath in 1997.   ( per Dr. Delong Crew at [facility]. Preserved vent function. MVP. CAD w/normal LM, 30-40% stenosis LAD, 70-80% stenosis of ostium & prox seg diag branch, normal left CX & RCA.)    H/O cardiovascular stress test 1/30/2013, 06- 1/30/2013-Normal study. Normal perfusion in all regions. EF 62%. 06--EF=60%. Normal. Normal pattern of perfusion. LV normal size. No wall abnormality. Exercise capacity good.  (05-, 12-, 06-, , )    H/O chest pain     H/O chest x-ray 06-    Negative. Dx was dyspnea and CAD.  (02-)    H/O dizziness     H/O Doppler ultrasound 11-    (Carotid) Intimal thickening but no significant atherosclerotic plaque noted in right or left ICA. Doppler flow velocities w/in right and left ICA WNL.  H/O echocardiogram 1/30/2013, 05-    1/30/2013- LVSF normal. EF 50-55%. Impaired LV relaxation. Trace MR. Trace TR;   5- Normal LV size and systolic function. EF=60%. Chamber dimensions WNL. Mild concentric LV hypertrophy. Valves appear structurally normal.-OICC       H/O gastroesophageal reflux (GERD)     H/O pneumothorax Dx 1960    \"Both Sides\"    History of cardiac monitoring 12/03/2018    9 beat run of SVT, no other arrhythmias noted, primarily sinus rythym    History of complete ECG 06/06/2011    (06-, 07-, 06-, 06-)    History of exercise stress test 05/21/2019    Treadmill, Stopped due to fatigue and  Dyspnea.  History of stress test 11/07/2018    EF 61%, Normal    [place] (hard of hearing)     Bilateral Hearing Aids    Hx of CT scan of chest 11/12/2018    3 mm indeterminate solid nonobstructing endobronchial nodule of the proximal left upper lobe bronchus. Further evaluation with endoscopy is recommended. Bilateral basilar predominant intersitial changes suggestive of nonspecific interstitial pneumonitis.     Hx of Doppler echocardiogram 11/20/2018    EF55-60%,no valvular disease    Hyperlipidemia     Hypertension     Left Lung Cancer Dx 11-18    LEFT UPPER LOBECTOMY 1/28/19    Lung nodule     \"they say I have a spot on my lung- and mass in left lung so thats why doing the bronch\"( 12/7/2018)    Memory loss     MVP (mitral valve prolapse)     follows with Dr Jackie Mukherjee (myocardial infarction)     \"had heart attack 30 yrs ago a mild one\"    Shortness of breath on exertion     Teeth missing     Upper And Lower    Thyroid disease     Wears dentures     Full Upper    Wears glasses     Wears hearing aid     Bilateral Ears     PSHX:  has a past surgical history that includes bronchoscopy (N/A, 12/7/2018); eye surgery (Bilateral, 2000's); Dental surgery; knee surgery (Left, 1980's Or 1990's); Knee arthroscopy (Bilateral, 1970's To 1990's); Vasectomy (1980's); Cardiac catheterization (1980's Or 1990's); Colonoscopy (Last Done In 2006); Cholecystectomy, laparoscopic (2002); Lung removal, total (Left, 1/28/2019); and Colonoscopy (N/A, 4/9/2019). Allergies: Allergies   Allergen Reactions    Norco [Hydrocodone-Acetaminophen] Other (See Comments)     Drops BP.        FAM HX: family history includes COPD in his sister; Diabetes in his brother; Heart Attack in his father; Heart Disease in his mother; High Blood Pressure in his mother; Stroke in his father.   Soc HX:   Social History     Socioeconomic History    Marital status:      Spouse name: None    Number of children: 3    Years of education: None    Highest education level: None   Occupational History    Occupation: Retired     Comment: [employer] - worked [place] the [id number] [employer] Financial resource strain: None    Food insecurity     Worry: None     Inability: None    Transportation needs     Medical: None     Non-medical: None   Tobacco Use    Smoking status: Never Smoker    Smokeless tobacco: Never Used   Substance and Sexual Activity    Alcohol use: Never     Frequency: Never    Drug use: No    Sexual activity: Not Currently     Partners: Female   Lifestyle    Physical activity     Days per week: None     Minutes per session: None    Stress: None   Relationships    Social connections     Talks on phone: None     Gets together: None     Attends Anabaptism service: None     Active member of club or organization: None     Attends meetings of clubs or organizations: None     Relationship status: None    Intimate partner violence     Fear of current or ex partner: None     Emotionally abused: None     Physically abused: None     Forced sexual activity: None   Other Topics Concern    None   Social History Narrative    None       Data:   CBC with Differential:    Lab Results   Component Value Date    WBC 11.8 05/14/2021    RBC 4.38 05/14/2021    HGB 13.8 05/14/2021    HCT 42.4 05/14/2021     05/14/2021    MCV 96.8 05/14/2021    MCH 31.5 05/14/2021    MCHC 32.5 05/14/2021    RDW 12.7 05/14/2021    SEGSPCT 83.2 05/14/2021    LYMPHOPCT 10.6 05/14/2021    MONOPCT 3.9 05/14/2021    EOSPCT 2.3 06/06/2019    BASOPCT 0.6 05/14/2021    MONOSABS 0.5 05/14/2021    LYMPHSABS 1.3 05/14/2021    EOSABS 0.1 05/14/2021    BASOSABS 0.1 05/14/2021    DIFFTYPE AUTOMATED DIFFERENTIAL 05/14/2021       CMP:     Lab Results   Component Value Date     05/14/2021    K 4.5 05/14/2021    CL 93 05/14/2021    CO2 26 05/14/2021    BUN 27 05/14/2021    CREATININE 1.1 05/14/2021    GFRAA >60 05/14/2021    AGRATIO 1.5 06/06/2019    LABGLOM >60 05/14/2021    LABGLOM 53 06/06/2019    GLUCOSE 547 05/14/2021    PROT 6.7 05/14/2021    LABALBU 3.5 05/14/2021    CALCIUM 9.6 05/14/2021    BILITOT 0.6 05/14/2021    ALKPHOS 72 05/14/2021    AST 19 05/14/2021    ALT 27 05/14/2021       Troponin:  Lab Results   Component Value Date    TROPONINT <0.010 05/14/2021         Radiology Review:  CXR:      Medications:   Home Medications:   Prior to Admission medications    Medication Sig Start Date End Date Taking?  Authorizing Provider   predniSONE (DELTASONE) 10 MG tablet Take 10 mg by mouth daily 3 tablets for 7 days   Yes Historical Provider, MD   DULoxetine (CYMBALTA) 30 MG extended release capsule Take 30 mg by mouth daily   Yes Historical Provider, MD   dilTIAZem (CARDIZEM CD) 120 MG extended release capsule Take 1 capsule by mouth daily 11/30/20  Yes Jessica Perea MD   aspirin 81 MG EC tablet Take 1 tablet by mouth every morning Over The Counter 4/25/19  Yes Fuentes Sherman MD   donepezil (ARICEPT) 10 MG tablet Take 1 tablet by mouth nightly 3/29/19  Yes Michelle Villalobos MD   atorvastatin (LIPITOR) 20 MG tablet Take 1 tablet by mouth daily 3/29/19  Yes Michelle Villalobos MD   SITagliptin [facility]) 100 MG tablet Take 1 tablet by mouth daily 3/29/19  Yes Michelle Villalobos MD   levothyroxine (SYNTHROID) 50 MCG tablet Take 1 tablet by mouth Daily 3/26/19  Yes Michelle Villalobos MD   Cholecalciferol (VITAMIN D3) 5000 units TABS Take by mouth every morning Over The Counter   Yes Historical Provider, MD   sertraline (ZOLOFT) 100 MG tablet Take 1 tablet by mouth daily 8/7/20   Historical Provider, MD   pioglitazone (ACTOS) 45 MG tablet Take 1 tablet by mouth daily 3/29/19   Michelle Villalobos MD     Medications:    sodium chloride  500 mL Intravenous Once    insulin lispro  0.2 Units/kg Subcutaneous Once      Infusions:   PRN Meds:      Electronically signed by Bridger Gilmore DO on 5/14/2021 at 9:59 PM      This dictation was created with voice recognition software. While attempts have been made to review the dictation as it is transcribed, on occasion the spoken word can be misinterpreted by the technology leading to omissions or inappropriate words, phrases or sentences.

## 2021-05-15 NOTE — PROGRESS NOTES
Hospitalist Progress Note      Name:  Fe Baltazar /Age/Sex: 1940  (79 y.o. male)   MRN & CSN:  [id number] & [id number] Admission Date/Time: 2021  7:15 PM   Location:  4669/5094-Z PCP: Sabina Stovall 112 Day: 2    Assessment and Plan:       Chest pain, rule out acute coronary syndrome, with h/o coronary artery disease  Mechanical fall  -Continue on  telemetry and continuous pulse ox  -Status-post diagnostic cardiac cath on 2020 with LAD stent patent, circumflex and RCA patent, LVEDP 16. Recommended risk factor modification at that time. 2D echo on 11/3/2020 showed EF 50 to 55%, RVSP 32. Nuclear stress test 10/15/2020 was negative for ischemia at that time.  -Troponin <0.010 in ED, Cycle troponin x2 q6  -Continue home ASA, and statin  -SL nitro prn  -N.p.o.  -Holding off on Lexiscan stress test and 2D Echo given recent work-up  -Consult cardiology, appreciate recommendations     Chronic hypoxic respiratory failure secondary to pulmonary fibrosis  History of left lung cancer, status post resection  -Patient recently on prednisone as outpatient, likely caused hypoglycemic episode  -Continue home 5 L per nasal cannula O2  -DuoNebs every 4 hours as needed  -Family has been discussing palliative care as an outpatient.     Diabetes mellitus type 2 with hyperglycemia  Pseudohyponatremia  -Blood glucose 547 on presentation  -Na 128, K 4.5, CO2 26 on presentation  -Anion gap 9 on presentation  -Holding home oral medication, n.p.o., low n.p.o. SSI + BG checks ACHS, hypoglycemic protocol, and target -180  -Patient was given 500 cc bolus of normal saline in ED. We will continue with 100 mL an hour of lactated Ringer for 10 hours.     Other chronic medical conditions:  Continue all home meds except stated above or contraindicated.   Depression  HTN  HLD  Hypothyroidism  Hard of hearing  Overweight    Diet Diet NPO Effective Now   DVT Prophylaxis [x] Lovenox, [] Heparin, [] SCDs, [] Ambulation   GI Prophylaxis [] PPI,  [] H2 Blocker,  [] Carafate,  [] Diet/Tube Feeds   Code Status Full Code   Disposition Patient requires continued admission due to    MDM [] Low, [] Moderate,[]  High  Patient's risk as above due to      History of Present Illness: The patient was seen and examined at the bedside. Patient complain of left-sided chest pain which is reproducible, tender on palpation, chest x-ray showed no evidence of rib fracture there is bilateral pulmonary disease which is a chronic    Objective: Intake/Output Summary (Last 24 hours) at 5/15/2021 0831  Last data filed at 5/14/2021 2232  Gross per 24 hour   Intake --   Output 350 ml   Net -350 ml      Vitals:   Vitals:    05/15/21 0810   BP: 133/77   Pulse: 66   Resp: 15   Temp: 97.6 °F (36.4 °C)   SpO2: 99%     Physical Exam:   GEN Awake.  Alert , not in respiratory distress, not in pain  HEENT: PEERLA, , supple neck,   Chest: air entry equal bilaterally, no wheezing or crepitation, decreased breathing bilaterally  Heart: S1 and S2 heard, no murmur, no gallop or rub, regular rate  Abdomen: soft, ND , Nt, +BS  Extremities: no cyanosis, tenderness or erythema, peripheral pulses audible  Neurology: alert, oriented x3, able to move 4 limbs    Medications:   Medications:    aspirin  81 mg Oral QAM    atorvastatin  20 mg Oral Daily    dilTIAZem  120 mg Oral Daily    donepezil  10 mg Oral Nightly    DULoxetine  30 mg Oral Daily    levothyroxine  50 mcg Oral Daily    sodium chloride flush  5-40 mL Intravenous 2 times per day    insulin lispro  0-6 Units Subcutaneous Q4H    enoxaparin  40 mg Subcutaneous Daily    lidocaine  1 patch Transdermal Daily      Infusions:    dextrose      sodium chloride      lactated ringers 100 mL/hr at 05/15/21 0022     PRN Meds: ipratropium-albuterol, 1 ampule, Q4H PRN  glucose, 15 g, PRN  dextrose, 12.5 g, PRN  glucagon (rDNA), 1 mg, PRN  dextrose, 100 mL/hr, PRN  sodium chloride flush, 5-40 mL, PRN  sodium chloride, 25 mL, PRN  promethazine, 12.5 mg, Q6H PRN  acetaminophen, 650 mg, Q6H PRN   Or  acetaminophen, 650 mg, Q6H PRN  polyethylene glycol, 17 g, Daily PRN  nitroGLYCERIN, 0.4 mg, Q5 Min PRN          Electronically signed by Alanis Galeano MD on 5/15/2021 at 8:31 AM

## 2021-05-15 NOTE — CONSULTS
INPATIENT CARDIOLOGY CONSULT NOTE       Reason for consultation:  Chest Pain     Referring physician:  Estelle Mckeon DO     Primary care physician: ASHLEE Piper - TEE      Dear Estelle Mckeon DO Thank you for the consult    Chief Complaint   Patient presents with    Hyperglycemia     prednisone x2 weeks,  per EMS    Rib Pain     left, fall 2 days ago       History of present illness:Jefferson is a 79 y. o.year old who  presents with  Chief Complaint   Patient presents with    Hyperglycemia     prednisone x2 weeks,  per EMS    Rib Pain     left, fall 2 days ago     29-year-old male with prior medical history significant for coronary artery disease, diabetes mellitus, hypertension, hyperlipidemia presented to the hospital status post fall, mechanical in nature, followed by chest pain on the left side. Patient recently underwent left heart cath in November 2020 which showed patent stents in LAD circumflex RCA. An echocardiogram showed preserved LVEF.   Prior to that patient had a normal stress test.    Chest Pain:  Left side, sharp like, 3/10, non exertional in nature, non radiating, exacerbated with deep breaths and upon touching the chest.      EKG shows normal sinus rhythm without any ischemic changes, LVH      Past medical history:    has a past medical history of Arthritis, Asthma, Back pain, CAD (coronary artery disease), cardiovascular stress test, Diabetes mellitus ([address] echocardiogram, Glaucoma, H/O 24 hour EKG monitoring, H/O cardiac catheterization, H/O cardiovascular stress test, H/O chest pain, H/O chest x-ray, H/O dizziness, H/O Doppler ultrasound, H/O echocardiogram, H/O gastroesophageal reflux (GERD), H/O pneumothorax, History of cardiac monitoring, History of complete ECG, History of exercise stress test, History of stress test, [place] (hard of hearing), Hx of CT scan of chest, Hx of Doppler echocardiogram, Hyperlipidemia, Hypertension, Left Lung Cancer, Lung nodule, Memory loss, MVP (mitral valve prolapse), Old MI (myocardial infarction), Shortness of breath on exertion, Teeth missing, Thyroid disease, Wears dentures, Wears glasses, and Wears hearing aid. Past surgical history:   has a past surgical history that includes bronchoscopy (N/A, 12/7/2018); eye surgery (Bilateral, 2000's); Dental surgery; knee surgery (Left, 1980's Or 1990's); Knee arthroscopy (Bilateral, 1970's To 1990's); Vasectomy (1980's); Cardiac catheterization (1980's Or 1990's); Colonoscopy (Last Done In 2006); Cholecystectomy, laparoscopic (2002); Lung removal, total (Left, 1/28/2019); and Colonoscopy (N/A, 4/9/2019). Social History:   reports that he has never smoked. He has never used smokeless tobacco. He reports that he does not drink alcohol and does not use drugs.   Family history:   no family history of CAD, STROKE of DM    Allergies   Allergen Reactions    Norco [Hydrocodone-Acetaminophen] Other (See Comments)     Drops BP.        aspirin EC tablet 81 mg, QAM  atorvastatin (LIPITOR) tablet 20 mg, Daily  dilTIAZem (CARDIZEM CD) extended release capsule 120 mg, Daily  donepezil (ARICEPT) tablet 10 mg, Nightly  DULoxetine (CYMBALTA) extended release capsule 30 mg, Daily  levothyroxine (SYNTHROID) tablet 50 mcg, Daily  ipratropium-albuterol (DUONEB) nebulizer solution 1 ampule, Q4H PRN  glucose (GLUTOSE) 40 % oral gel 15 g, PRN  dextrose 50 % IV solution, PRN  glucagon (rDNA) injection 1 mg, PRN  dextrose 5 % solution, PRN  sodium chloride flush 0.9 % injection 5-40 mL, 2 times per day  sodium chloride flush 0.9 % injection 5-40 mL, PRN  0.9 % sodium chloride infusion, PRN  promethazine (PHENERGAN) tablet 12.5 mg, Q6H PRN  acetaminophen (TYLENOL) tablet 650 mg, Q6H PRN   Or  acetaminophen (TYLENOL) suppository 650 mg, Q6H PRN  polyethylene glycol (GLYCOLAX) packet 17 g, Daily PRN  insulin lispro (HUMALOG) injection vial 0-6 Units, Q4H  enoxaparin (LOVENOX) injection 40 mg, Daily  nitroGLYCERIN (NITROSTAT) SL tablet 0.4 mg, Q5 Min PRN  lidocaine 4 % external patch 1 patch, Daily        Current Facility-Administered Medications   Medication Dose Route Frequency Provider Last Rate Last Admin    aspirin EC tablet 81 mg  81 mg Oral [facility], DO   81 mg at 05/15/21 0948    atorvastatin (LIPITOR) tablet 20 mg  20 mg Oral Daily [facility], DO   20 mg at 05/15/21 0948    dilTIAZem (CARDIZEM CD) extended release capsule 120 mg  120 mg Oral Daily [facility], DO   120 mg at 05/15/21 0948    donepezil (ARICEPT) tablet 10 mg  10 mg Oral Nightly [facility], DO        DULoxetine (CYMBALTA) extended release capsule 30 mg  30 mg Oral Daily [facility], DO   30 mg at 05/15/21 0948    levothyroxine (SYNTHROID) tablet 50 mcg  50 mcg Oral Daily [facility], DO   50 mcg at 05/15/21 0947    ipratropium-albuterol (DUONEB) nebulizer solution 1 ampule  1 ampule Inhalation Q4H PRN [facility], DO        glucose (GLUTOSE) 40 % oral gel 15 g  15 g Oral PRN [facility], DO        dextrose 50 % IV solution  12.5 g Intravenous PRN [facility], DO        glucagon (rDNA) injection 1 mg  1 mg Intramuscular PRN [facility], DO        dextrose 5 % solution  100 mL/hr Intravenous PRN [facility], DO        sodium chloride flush 0.9 % injection 5-40 mL  5-40 mL Intravenous 2 times per day [facility], DO   10 mL at 05/15/21 0955    sodium chloride flush 0.9 % injection 5-40 mL  5-40 mL Intravenous PRN [facility], DO        0.9 % sodium chloride infusion  25 mL Intravenous PRN [facility], DO        promethazine (PHENERGAN) tablet 12.5 mg  12.5 mg Oral Q6H PRN [facility], DO        acetaminophen (TYLENOL) tablet 650 mg  650 mg Oral Q6H PRN [facility], DO        Or    acetaminophen (TYLENOL) suppository 650 mg  650 mg Rectal Q6H PRN [facility], DO        polyethylene glycol (GLYCOLAX) packet 17 g  17 g Oral Daily PRN [facility], DO        insulin lispro (HUMALOG) injection vial 0-6 Units  0-6 Units Subcutaneous Q4H [facility], DO   1 Units at 05/15/21 1209    enoxaparin (LOVENOX) injection 40 mg  40 mg Subcutaneous Daily [facility], DO   40 mg at 05/15/21 0949    nitroGLYCERIN (NITROSTAT) SL tablet 0.4 mg  0.4 mg Sublingual Q5 Min PRN [facility], DO        lidocaine 4 % external patch 1 patch  1 patch Transdermal Daily [facility], DO   1 patch at 05/15/21 7530         Review of Systems:     · Constitutional: No Fever or Weight Loss   · Eyes: No Decreased Vision  · ENT: No Headaches, Hearing Loss or Vertigo  · Cardiovascular: + chest pain,  + dyspnea on exertion, no palpitations or loss of consciousness  · Respiratory: No cough or wheezing    · Gastrointestinal: No abdominal pain, appetite loss, blood in stools, constipation, diarrhea or heartburn  · Genitourinary: No dysuria, trouble voiding, or hematuria  · Musculoskeletal:  No gait disturbance, weakness or joint complaints  · Integumentary: No rash or pruritis  · Neurological: No TIA or stroke symptoms  · Psychiatric: No anxiety or depression  · Endocrine: No malaise, fatigue or temperature intolerance  · Hematologic/Lymphatic: No bleeding problems, blood clots or swollen lymph nodes  · Allergic/Immunologic: No nasal congestion or hives    All other systems were reviewed and were negative otherwise. Physical Examination:      Vitals:    05/15/21 1217   BP:    Pulse:    Resp: 19   Temp:    SpO2: 99%      Wt Readings from Last 3 Encounters:   05/14/21 194 lb (88 kg)   11/23/20 204 lb (92.5 kg)   11/06/20 203 lb (92.1 kg)     Body mass index is 26.31 kg/m². General Appearance:  No distress, conversant  Constitutional:  Well developed, Well nourished  HEENT:  Normocephalic, Atraumatic, Oropharynx moist, No oral exudates,   Nose normal. Neck Supple Carotid: no carotid bruit  Eyes:  Conjunctiva normal, No discharge.    Respiratory:    Normal breath sounds, No respiratory distress, No wheezing, no use of accessory muscles, diaphragm movement is normal  No chest Tenderness  Cardiovascular: S1-S2 No murmurs auscultated. No rubs, thrills or gallops. Normal  rhythm. Pedal pulses are normal. No pedal edema + reproducible chest pain  GI:  Soft Non tender, non distended. :  No CVA tenderness. Musculoskeletal:   No tenderness, No cyanosis, No clubbing. Integument:  Warm, Dry, No erythema, No rash. Lymphatic:  No lymphadenopathy noted. Neurologic:  Alert & oriented x 3  No focal deficits noted. Psychiatric:  Affect normal, Judgment normal, Mood normal.       Lab Review     Recent Labs     05/15/21  0336   WBC 12.0*   HGB 12.8*   HCT 39.4*         Recent Labs     05/15/21  0336      K 4.3   CL 98*   CO2 29   BUN 24*   CREATININE 1.0     Recent Labs     05/14/21 2022   AST 19   ALT 27   BILIDIR 0.3   BILITOT 0.6   ALKPHOS 72     No results for input(s): TROPONINI in the last 72 hours. No results found for: BNP  Lab Results   Component Value Date    INR 0.93 11/04/2020    PROTIME 11.3 (L) 11/04/2020         All labs, images, EKGs were personally reviewed      Assessment: 79 y. o.year old with PMH of  has a past medical history of Arthritis, Asthma, Back pain, CAD (coronary artery disease), cardiovascular stress test, Diabetes mellitus ([facility] [address] echocardiogram, Glaucoma, H/O 24 hour EKG monitoring, H/O cardiac catheterization, H/O cardiovascular stress test, H/O chest pain, H/O chest x-ray, H/O dizziness, H/O Doppler ultrasound, H/O echocardiogram, H/O gastroesophageal reflux (GERD), H/O pneumothorax, History of cardiac monitoring, History of complete ECG, History of exercise stress test, History of stress test, [place] (hard of hearing), Hx of CT scan of chest, Hx of Doppler echocardiogram, Hyperlipidemia, Hypertension, Left Lung Cancer, Lung nodule, Memory loss, MVP (mitral valve prolapse), Old MI (myocardial infarction), Shortness of breath on exertion, Teeth missing, Thyroid disease, Wears dentures, Wears glasses, and Wears hearing aid. Recommendations:      1. Chest Pain / MSK in origin and reproducible. EKG NSR/no ischemic changes. No ischemic workup planned  2. Coronary disease: Recent left heart cath/stress MPI reviewed. Continue with medical management with aspirin/statin/calcium channel blocker. 3. History of COPD/pulmonary fibrosis /history of lung cancer s/p resection. Patient chest pain is upon deep breaths on the left side where he had lobectomy, also is reproducible in nature. Continue with high flow oxygen  4. Diabetes mellitus on meds. 5. Essential hypertension: Stable. Continue with Cardizem.       Thank you for the consult    Dr. Jd Rodriguez  5/15/2021 2:35 PM

## 2021-05-15 NOTE — PROGRESS NOTES
alert and oriented. Skin assessment completed with GENA Leroy, skin is warm dry and intact. Red blanchable skin noted to right heel.  Will continue to monitor

## 2021-05-15 NOTE — ED NOTES
Went over medications with family member (wife) gave medications back to wife     Ally Mariano [facility]  05/14/21 2039

## 2021-05-16 LAB
ALBUMIN SERPL-MCNC: 3.4 GM/DL (ref 3.4–5)
ALP BLD-CCNC: 61 IU/L (ref 40–128)
ALT SERPL-CCNC: 30 U/L (ref 10–40)
ANION GAP SERPL CALCULATED.3IONS-SCNC: 10 MMOL/L (ref 4–16)
AST SERPL-CCNC: 30 IU/L (ref 15–37)
BILIRUB SERPL-MCNC: 0.6 MG/DL (ref 0–1)
BUN BLDV-MCNC: 27 MG/DL (ref 6–23)
CALCIUM SERPL-MCNC: 8.9 MG/DL (ref 8.3–10.6)
CHLORIDE BLD-SCNC: 101 MMOL/L (ref 99–110)
CO2: 28 MMOL/L (ref 21–32)
CREAT SERPL-MCNC: 1.2 MG/DL (ref 0.9–1.3)
EKG ATRIAL RATE: 82 BPM
EKG DIAGNOSIS: NORMAL
EKG P AXIS: 28 DEGREES
EKG P-R INTERVAL: 172 MS
EKG Q-T INTERVAL: 390 MS
EKG QRS DURATION: 94 MS
EKG QTC CALCULATION (BAZETT): 455 MS
EKG R AXIS: -28 DEGREES
EKG T AXIS: 76 DEGREES
EKG VENTRICULAR RATE: 82 BPM
GFR AFRICAN AMERICAN: >60 ML/MIN/1.73M2
GFR NON-AFRICAN AMERICAN: 58 ML/MIN/1.73M2
GLUCOSE BLD-MCNC: 145 MG/DL (ref 70–99)
GLUCOSE BLD-MCNC: 162 MG/DL (ref 70–99)
GLUCOSE BLD-MCNC: 170 MG/DL (ref 70–99)
GLUCOSE BLD-MCNC: 182 MG/DL (ref 70–99)
GLUCOSE BLD-MCNC: 189 MG/DL (ref 70–99)
GLUCOSE BLD-MCNC: 279 MG/DL (ref 70–99)
HCT VFR BLD CALC: 40 % (ref 42–52)
HEMOGLOBIN: 12.9 GM/DL (ref 13.5–18)
MCH RBC QN AUTO: 30.9 PG (ref 27–31)
MCHC RBC AUTO-ENTMCNC: 32.3 % (ref 32–36)
MCV RBC AUTO: 95.7 FL (ref 78–100)
PDW BLD-RTO: 12.7 % (ref 11.7–14.9)
PLATELET # BLD: 160 K/CU MM (ref 140–440)
PMV BLD AUTO: 11.1 FL (ref 7.5–11.1)
POTASSIUM SERPL-SCNC: 4 MMOL/L (ref 3.5–5.1)
RBC # BLD: 4.18 M/CU MM (ref 4.6–6.2)
SODIUM BLD-SCNC: 139 MMOL/L (ref 135–145)
TOTAL PROTEIN: 5.9 GM/DL (ref 6.4–8.2)
WBC # BLD: 10 K/CU MM (ref 4–10.5)

## 2021-05-16 PROCEDURE — 85027 COMPLETE CBC AUTOMATED: CPT

## 2021-05-16 PROCEDURE — 2700000000 HC OXYGEN THERAPY PER DAY

## 2021-05-16 PROCEDURE — APPSS60 APP SPLIT SHARED TIME 46-60 MINUTES: Performed by: NURSE PRACTITIONER

## 2021-05-16 PROCEDURE — 97116 GAIT TRAINING THERAPY: CPT

## 2021-05-16 PROCEDURE — 6360000002 HC RX W HCPCS: Performed by: STUDENT IN AN ORGANIZED HEALTH CARE EDUCATION/TRAINING PROGRAM

## 2021-05-16 PROCEDURE — 6370000000 HC RX 637 (ALT 250 FOR IP): Performed by: NURSE PRACTITIONER

## 2021-05-16 PROCEDURE — 93010 ELECTROCARDIOGRAM REPORT: CPT | Performed by: INTERNAL MEDICINE

## 2021-05-16 PROCEDURE — 1200000000 HC SEMI PRIVATE

## 2021-05-16 PROCEDURE — 82962 GLUCOSE BLOOD TEST: CPT

## 2021-05-16 PROCEDURE — 97162 PT EVAL MOD COMPLEX 30 MIN: CPT

## 2021-05-16 PROCEDURE — 96372 THER/PROPH/DIAG INJ SC/IM: CPT

## 2021-05-16 PROCEDURE — 80053 COMPREHEN METABOLIC PANEL: CPT

## 2021-05-16 PROCEDURE — 2580000003 HC RX 258: Performed by: STUDENT IN AN ORGANIZED HEALTH CARE EDUCATION/TRAINING PROGRAM

## 2021-05-16 PROCEDURE — 36415 COLL VENOUS BLD VENIPUNCTURE: CPT

## 2021-05-16 PROCEDURE — 99232 SBSQ HOSP IP/OBS MODERATE 35: CPT | Performed by: INTERNAL MEDICINE

## 2021-05-16 PROCEDURE — 6370000000 HC RX 637 (ALT 250 FOR IP): Performed by: STUDENT IN AN ORGANIZED HEALTH CARE EDUCATION/TRAINING PROGRAM

## 2021-05-16 PROCEDURE — 94761 N-INVAS EAR/PLS OXIMETRY MLT: CPT

## 2021-05-16 RX ORDER — DILTIAZEM HYDROCHLORIDE 180 MG/1
180 CAPSULE, COATED, EXTENDED RELEASE ORAL DAILY
Status: DISCONTINUED | OUTPATIENT
Start: 2021-05-16 | End: 2021-05-17 | Stop reason: HOSPADM

## 2021-05-16 RX ADMIN — INSULIN LISPRO 1 UNITS: 100 INJECTION, SOLUTION INTRAVENOUS; SUBCUTANEOUS at 12:48

## 2021-05-16 RX ADMIN — INSULIN LISPRO 1 UNITS: 100 INJECTION, SOLUTION INTRAVENOUS; SUBCUTANEOUS at 17:12

## 2021-05-16 RX ADMIN — INSULIN LISPRO 3 UNITS: 100 INJECTION, SOLUTION INTRAVENOUS; SUBCUTANEOUS at 20:26

## 2021-05-16 RX ADMIN — SODIUM CHLORIDE, PRESERVATIVE FREE 10 ML: 5 INJECTION INTRAVENOUS at 21:00

## 2021-05-16 RX ADMIN — DULOXETINE HYDROCHLORIDE 30 MG: 30 CAPSULE, DELAYED RELEASE ORAL at 08:41

## 2021-05-16 RX ADMIN — INSULIN LISPRO 1 UNITS: 100 INJECTION, SOLUTION INTRAVENOUS; SUBCUTANEOUS at 08:42

## 2021-05-16 RX ADMIN — ENOXAPARIN SODIUM 40 MG: 40 INJECTION SUBCUTANEOUS at 08:41

## 2021-05-16 RX ADMIN — DILTIAZEM HYDROCHLORIDE 180 MG: 180 CAPSULE, COATED, EXTENDED RELEASE ORAL at 08:41

## 2021-05-16 RX ADMIN — DONEPEZIL HYDROCHLORIDE 10 MG: 10 TABLET, FILM COATED ORAL at 20:26

## 2021-05-16 RX ADMIN — ASPIRIN 81 MG: 81 TABLET, COATED ORAL at 08:41

## 2021-05-16 RX ADMIN — SODIUM CHLORIDE, PRESERVATIVE FREE 10 ML: 5 INJECTION INTRAVENOUS at 08:41

## 2021-05-16 RX ADMIN — ATORVASTATIN CALCIUM 20 MG: 20 TABLET, FILM COATED ORAL at 08:41

## 2021-05-16 RX ADMIN — LEVOTHYROXINE SODIUM 50 MCG: 0.05 TABLET ORAL at 08:41

## 2021-05-16 ASSESSMENT — ENCOUNTER SYMPTOMS: EYE DISCHARGE: 1

## 2021-05-16 ASSESSMENT — PAIN SCALES - GENERAL
PAINLEVEL_OUTOF10: 0

## 2021-05-16 ASSESSMENT — PAIN DESCRIPTION - PROGRESSION
CLINICAL_PROGRESSION: NOT CHANGED
CLINICAL_PROGRESSION: NOT CHANGED

## 2021-05-16 NOTE — CONSULTS
[address] [facility],[address] ACUTE CARE PHYSICAL THERAPY EVALUATION  Lupe Saravia, 1940, 3015/3015-A, 5/16/2021    History  [place]:  The primary encounter diagnosis was Chest pain, unspecified type. Diagnoses of Hyperglycemia, Steroid-induced hyperglycemia, Failure to thrive in adult, and Difficulty in walking were also pertinent to this visit. Patient  has a past medical history of Arthritis, Asthma, Back pain, CAD (coronary artery disease), cardiovascular stress test, Diabetes mellitus ([facility] [address] echocardiogram, Glaucoma, H/O 24 hour EKG monitoring, H/O cardiac catheterization, H/O cardiovascular stress test, H/O chest pain, H/O chest x-ray, H/O dizziness, H/O Doppler ultrasound, H/O echocardiogram, H/O gastroesophageal reflux (GERD), H/O pneumothorax, History of cardiac monitoring, History of complete ECG, History of exercise stress test, History of stress test, [place] (hard of hearing), Hx of CT scan of chest, Hx of Doppler echocardiogram, Hyperlipidemia, Hypertension, Left Lung Cancer, Lung nodule, Memory loss, MVP (mitral valve prolapse), Old MI (myocardial infarction), Shortness of breath on exertion, Teeth missing, Thyroid disease, Wears dentures, Wears glasses, and Wears hearing aid. Patient  has a past surgical history that includes bronchoscopy (N/A, 12/7/2018); eye surgery (Bilateral, 2000's); Dental surgery; knee surgery (Left, 1980's Or 1990's); Knee arthroscopy (Bilateral, 1970's To 1990's); Vasectomy (1980's); Cardiac catheterization (1980's Or 1990's); Colonoscopy (Last Done In 2006); Cholecystectomy, laparoscopic (2002); Lung removal, total (Left, 1/28/2019); and Colonoscopy (N/A, 4/9/2019). Subjective:  Patient states:  \"I'm just so tired. \"    Pain:  Denies pain.     Communication with other providers:  Handoff to RN  Restrictions: general precautions, fall risk    Home Setup/Prior level of function  Social/Functional History  Lives With: Spouse  Type of Home: House  Home Layout: One level  Home Access: Stairs to enter with rails  Entrance Stairs - Number of Steps: 2  Bathroom Shower/Tub: Tub/Shower unit  Bathroom Toilet: Handicap height  Bathroom Equipment: Grab bars in shower, Shower chair, Grab bars around toilet  Home Equipment: Rolling walker, Wheelchair-manual (Uses RW in home and WC in community. Only ambulates short distances)  ADL Assistance: Independent  Homemaking Assistance: Independent  Homemaking Responsibilities: No  Ambulation Assistance: Independent  Transfer Assistance: Independent  Active : No  Additional Comments: Pt states only ambulates short distances with RW in home. Pt with 3L O2 at baseline    Examination of body systems (includes body structures/functions, activity/participation limitations):  · Observation:  Pt supine in bed upon arrival and agreeable to therapy, drifting off multiple times throughout subjective/PLOF  · Vision:  Wears glasses  · Hearing:  Slightly [place]  · Cardiopulmonary:  3L O2 at baseline  · Cognition: WFL, see OT/SLP note for further evaluation. Musculoskeletal  · ROM R/L:  WFL. · Strength R/L:  4+/5, minimal impairment in function and endurance. · Neuro:  [facility]      Mobility:  · Rolling L/R:  supervision  · Supine to sit:  Supervision with use of bedrails  · Transfers: Pt completed STS from EOB and to commode CGA progressing to SBA  · Sitting balance:  good. · Standing balance:  Fair+. · Gait: Pt ambulated 30' with RW CGA with decreased nancy, slightly forward flexed posture, and somewhat narrowed FARRUKH. Pt with no LOB, SOB, or dizziness throughout. Pt states he is ambulating at baseline and only ambulates short distances within home. Pt did not wish to ambulate in hallway or any further in room. [facility] 6 Clicks Inpatient Mobility:  AM-PAC Inpatient Mobility Raw Score : 18    Safety: patient left on commode with RN in room, call light within reach, RN notified, gait belt used.     Assessment:  Pt is a 79 y.o. male admitted to the hospital for chest pain. Pt is typically independent with all ambulation and transfers with RW. Pt currently requires supervision for bed mobility, CGA for transfers, and CGA for ambulating 30' with RW. Functionally, pt does not require much assist but is moderately limited by lethargy this eval, falling asleep when attempting to obtain PLOF. Once lethargy resolved, anticipate improved endurance. Pt is presenting with decreased endurance, increased fatigue, impaired transfers, impaired gait. Pt would benefit from continued acute care PT as well as [place] [id number] PT upon discharge to continue to address impairments. Complexity: moderate  Prognosis: Good, no significant barriers to participation at this time.   Plan Times per week: 3+/week     Equipment: none, pt owns all necessary equipment    Goals:  Short term goals  Time Frame for Short term goals: 1 week  Short term goal 1: Pt to complete all STS transfers to/from bed, commode, and chair mod I  Short term goal 2: Pt to ambulate 100' with LRAD mod I  Short term goal 3: Pt to ascend/descend at least 2 stairs with HR and supervision to simulate home set up       Treatment plan:  Bed mobility, transfers, balance, gait, TA, TX    Recommendations for NURSING mobility: amb with RW and gait belt    Time:   Time in: 0932  Time out: 0950  Timed treatment minutes: 8  Total time: 18    Electronically signed by:    Aisha Valentine PT  5/16/2021, 10:01 AM

## 2021-05-16 NOTE — DISCHARGE SUMMARY
Discharge Summary    Name:  Shady Flores /Age/Sex: 1940  (79 y.o. male)   MRN & CSN:  [id number] & [id number] Admission Date/Time: 2021  7:15 PM   Attending:  Niranjan Alonso MD Discharging Physician: Niranjan Alonso MD     Hospital Course:   Shady Flores is a 79 y.o.  male  who presents with Chest pain    79years old male with prior medical history significant for chronic disease diabetes mellitus hypertension hyperlipidemia was admitted because of the left-sided chest pain post mechanical fall, patient recently underwent left heart cath in 2020 which showed patent stent in LAD circumflex RCA, and echocardiogram showed preserved ejection fraction, EKG shows normal sinus rhythm without any ischemic changes, cardiologist consulted felt chest pain is not cardiac in origin it is reproducible and  no ischemic work-up needed. Patient will be discharged home with home health  Patient has generalized weakness, I offered physical therapy evaluation however the patient wants to go home and he does not consider rehab or skilled nursing home    The patient expressed appropriate understanding of and agreement with the discharge recommendations, medications, and plan.      Consults this admission:  IP CONSULT TO HOSPITALIST  IP CONSULT TO CARDIOLOGY    Discharge Instruction:   Follow up appointments:   Primary care physician:  within 2 weeks    Diet:  cardiac diet   Activity: activity as tolerated  Disposition: Discharged to:   [x]Home, []HHC, []SNF, []Acute Rehab, []Hospice   Condition on discharge: Stable    Discharge Medications:      Gwenette Kocher   [place] Medication Instructions SARAH:[id number]    Printed on:21 0835   Medication Information                      aspirin 81 MG EC tablet  Take 1 tablet by mouth every morning Over The Counter             atorvastatin (LIPITOR) 20 MG tablet  Take 1 tablet by mouth daily             Cholecalciferol (VITAMIN D3) 5000 units TABS  Take by mouth every morning Over The Counter             dilTIAZem (CARDIZEM CD) 120 MG extended release capsule  Take 1 capsule by mouth daily             donepezil (ARICEPT) 10 MG tablet  Take 1 tablet by mouth nightly             DULoxetine (CYMBALTA) 30 MG extended release capsule  Take 30 mg by mouth daily             levothyroxine (SYNTHROID) 50 MCG tablet  Take 1 tablet by mouth Daily             pioglitazone (ACTOS) 45 MG tablet  Take 1 tablet by mouth daily             predniSONE (DELTASONE) 10 MG tablet  Take 10 mg by mouth daily 3 tablets for 7 days             sertraline (ZOLOFT) 100 MG tablet  Take 1 tablet by mouth daily             SITagliptin (JANUVIA) 100 MG tablet  Take 1 tablet by mouth daily                 Objective Findings at Discharge:   BP (!) 148/77   Pulse 73   Temp 97.5 °F (36.4 °C) (Oral)   Resp 25   Ht 6' (1.829 m)   Wt 194 lb (88 kg)   SpO2 99%   BMI 26.31 kg/m²            PHYSICAL EXAM   GEN Awake male, sitting upright in bed in no apparent distress. Appears given age. EYES Pupils are equally round. No scleral erythema, discharge, or conjunctivitis. HENT Mucous membranes are moist. Oral pharynx without exudates, no evidence of thrush. NECK Supple, no apparent thyromegaly or masses. RESP Clear to auscultation, no wheezes, rales or rhonchi. Symmetric chest movement while on room air. CARDIO/VASC S1/S2 auscultated. Regular rate without appreciable murmurs, rubs, or gallops. No JVD or carotid bruits. Peripheral pulses equal bilaterally and palpable. No peripheral edema. GI Abdomen is soft without significant tenderness, masses, or guarding. Bowel sounds are normoactive. Rectal exam deferred. Clement Fly NEURO Cranial nerves appear grossly intact, normal speech, no lateralizing weakness.     BMP/CBC  Recent Labs     05/14/21 2022 05/15/21  0336 05/16/21  0422   * 136 139   K 4.5 4.3 4.0   CL 93* 98* 101   CO2 26 29 28   BUN 27* 24* 27*   CREATININE 1.1 1.0 1.2   WBC

## 2021-05-16 NOTE — PROGRESS NOTES
[facility] [place] [facility] [address] [facility] [address]  Phone: [phone]    Fax [phone]                  Rik Duane, MD, Jerod Espinoza MD, [address] Caitlyn Morin MD, MD Deisy Barnett MD Vannie Sober, MD Rosalind Smiling, MD Ulus Quarto, APRCELESTINE Mosher, ASHLEE Baptiste, APRCELESTINE Meyer, APRN    Cardiology Progress Note     Today's Plan: sign off     Admit Date:  5/14/2021    Consult reason/ Seen today for: chest pain    Subjective and  Overnight Events:  Patient feeling okay. He is having pain with deep breathing. Assessment / Plan / Recommendation:     1. Chest pain: reproduceable. Troponin are negative. EKG does not have ischemic changes. No further cardiac workup planned. 2. CAD: Recent LHC/ Stress reviewed by Dr. Carly Eastman with bb, statin, ASA. 3. History of COPD, pulmonary fibrosis, and lung cancer. SP lung resection. Pain with deep inspiration at location of lobectomy. 4. HTN: slightly elevated, increase cardizem to 180 mg daily. 5. DM: per primary  6. DVT prophylaxis if not contraindicated while in the hospital.    7. Will sign off. Please re consult if additional cardiology recommendations are needed. History of Presenting Illness:    Chief complain on admission : 79 y. o.year old who is admitted for  Chief Complaint   Patient presents with    Hyperglycemia     prednisone x2 weeks,  per EMS    Rib Pain     left, fall 2 days ago        Past medical history:    has a past medical history of Arthritis, Asthma, Back pain, CAD (coronary artery disease), cardiovascular stress test, Diabetes mellitus ([facility] [address] echocardiogram, Glaucoma, H/O 24 hour EKG monitoring, H/O cardiac catheterization, H/O cardiovascular stress test, H/O chest pain, H/O chest x-ray, H/O dizziness, H/O Doppler ultrasound, H/O echocardiogram, H/O gastroesophageal reflux (GERD), H/O pneumothorax, History of cardiac monitoring, History of complete ECG, History of exercise stress test, History of stress test, [place] (hard of hearing), Hx of CT scan of chest, Hx of Doppler echocardiogram, Hyperlipidemia, Hypertension, Left Lung Cancer, Lung nodule, Memory loss, MVP (mitral valve prolapse), Old MI (myocardial infarction), Shortness of breath on exertion, Teeth missing, Thyroid disease, Wears dentures, Wears glasses, and Wears hearing aid. Past surgical history:   has a past surgical history that includes bronchoscopy (N/A, 12/7/2018); eye surgery (Bilateral, 2000's); Dental surgery; knee surgery (Left, 1980's Or 1990's); Knee arthroscopy (Bilateral, 1970's To 1990's); Vasectomy (1980's); Cardiac catheterization (1980's Or 1990's); Colonoscopy (Last Done In 2006); Cholecystectomy, laparoscopic (2002); Lung removal, total (Left, 1/28/2019); and Colonoscopy (N/A, 4/9/2019). Social History:   reports that he has never smoked. He has never used smokeless tobacco. He reports that he does not drink alcohol and does not use drugs. Family history:  family history includes COPD in his sister; Diabetes in his brother; Heart Attack in his father; Heart Disease in his mother; High Blood Pressure in his mother; Stroke in his father. Allergies   Allergen Reactions    Norco [Hydrocodone-Acetaminophen] Other (See Comments)     Drops BP. Review of Systems   Eyes: Positive for discharge. All 14 systems were reviewed and are negative  Except for the positive findings  which as documented     BP (!) 149/77   Pulse 72   Temp 97.6 °F (36.4 °C) (Oral)   Resp 20   Ht 6' (1.829 m)   Wt 194 lb (88 kg)   SpO2 98%   BMI 26.31 kg/m²       Intake/Output Summary (Last 24 hours) at 5/16/2021 1157  Last data filed at 5/16/2021 0855  Gross per 24 hour   Intake 360 ml   Output 700 ml   Net -340 ml       Physical Exam  Vitals reviewed. Constitutional:       General: He is not in acute distress. Appearance: Normal appearance.  He is not ill-appearing. HENT:      Head: Atraumatic. Neck:      Vascular: No carotid bruit. Cardiovascular:      Rate and Rhythm: Normal rate and regular rhythm. Pulses: Normal pulses. Heart sounds: Normal heart sounds. No murmur heard. Pulmonary:      Effort: Pulmonary effort is normal. No respiratory distress. Breath sounds: Normal breath sounds. Musculoskeletal:         General: No swelling or deformity. Cervical back: Neck supple. No muscular tenderness. Neurological:      Mental Status: He is alert. Telemetry Reviewed:   Sinus rhythm    Medications:    dilTIAZem  180 mg Oral Daily    aspirin  81 mg Oral QAM    atorvastatin  20 mg Oral Daily    donepezil  10 mg Oral Nightly    DULoxetine  30 mg Oral Daily    levothyroxine  50 mcg Oral Daily    sodium chloride flush  5-40 mL Intravenous 2 times per day    insulin lispro  0-6 Units Subcutaneous Q4H    enoxaparin  40 mg Subcutaneous Daily    lidocaine  1 patch Transdermal Daily      dextrose      sodium chloride       melatonin, ketorolac, ipratropium-albuterol, glucose, dextrose, glucagon (rDNA), dextrose, sodium chloride flush, sodium chloride, promethazine, acetaminophen **OR** acetaminophen, polyethylene glycol, nitroGLYCERIN    Lab Data:  CBC:   Recent Labs     05/14/21  2022 05/15/21  0336 05/16/21  0422   WBC 11.8* 12.0* 10.0   HGB 13.8 12.8* 12.9*   HCT 42.4 39.4* 40.0*   MCV 96.8 94.9 95.7    172 160     BMP:   Recent Labs     05/14/21  2022 05/15/21  0336 05/16/21  0422   * 136 139   K 4.5 4.3 4.0   CL 93* 98* 101   CO2 26 29 28   BUN 27* 24* 27*   CREATININE 1.1 1.0 1.2     PT/INR: No results for input(s): PROTIME, INR in the last 72 hours.   BNP:    Recent Labs     05/14/21 2022   PROBNP 132.3     TROPONIN:   Recent Labs     05/14/21  2022 05/14/21  2324 05/15/21  0336   TROPONINT <0.010 <0.010 <0.010        ECHO :   Echocardiogram 11/3/2020  Summary   Limited study due to patients body habitus lung surgery. Left ventricular function is low normal, EF is estimated at 50-55%. Mild left ventricular hypertrophy. Normal diastolic filling pattern for age. Mildly dilated left atrium. Mild tricuspid regurgitation; RVSP is 32 mmHg. No significant valvular disease noted. No evidence of pericardial effusion. All labs, medications and tests reviewed by myself , continue all other medications of all above medical condition listed as is except for changes mentioned above. Thank you very much for consult , please call with questions. Electronically signed by ASHLEE Reyes CNP on 5/16/2021 at 11:57 AM            CARDIOLOGY ATTENDING ADDENDUM    I have seen, spoken to and examined this patient personally, independently of the nurse practitioner. I have reviewed the hospital care given to date and reviewed all pertinent labs and imaging. The plan was developed mutually at the time of the visit with the patient,  NP   and myself. I have spoken with patient, nursing staff and provided written and verbal instructions . The above note has been reviewed and I agree with the assessment, diagnosis, and treatment plan with changes made by me as follows       HPI:  I have reviewed the above HPI  And agree with above   Please review addendum/changes made to note above     Interval history:            Physical Exam:  General:  Awake, alert, NAD  Head:normal  Eye:normal  Neck:  No JVD   Chest:  Clear to auscultation, respiration easy  Cardiovascular:  s1s2  Abdomen:   nontender  Extremities:  no edema  Pulses; palpable  Neuro: grossly normal      MEDICAL DECISION MAKING;    I agree with the above plan, which was planned by myself and discussed with NP.     Dr. Jorge Ramirez MD

## 2021-05-16 NOTE — PROGRESS NOTES
Pt had a run of 19 beats of V-tach . Pt daughter witnessed him grabbing the left side of his chest and grimacing during episode. Pt was resting comfortably when I was evaluating his condition. Denies any chest pain for me. Dr. Owen Cheung and Dr. Andrew Egan notified of cardiac episode. Dr. Owen Cheung requested I place the telemetry strip on the chart. Pt discharge had been put on hold due to his lethargy today he was unable to stay awake in order to get ready for discharge. Wife was concerned he was too sleepy for her to get in the house. Pt did wake up for dinner and was visiting with family this evening.

## 2021-05-17 VITALS
HEIGHT: 72 IN | TEMPERATURE: 97.9 F | DIASTOLIC BLOOD PRESSURE: 86 MMHG | HEART RATE: 72 BPM | WEIGHT: 189 LBS | RESPIRATION RATE: 20 BRPM | BODY MASS INDEX: 25.6 KG/M2 | OXYGEN SATURATION: 98 % | SYSTOLIC BLOOD PRESSURE: 139 MMHG

## 2021-05-17 LAB
ALBUMIN SERPL-MCNC: 3.5 GM/DL (ref 3.4–5)
ANION GAP SERPL CALCULATED.3IONS-SCNC: 6 MMOL/L (ref 4–16)
BASE EXCESS MIXED: 6.1 (ref 0–1.2)
BUN BLDV-MCNC: 22 MG/DL (ref 6–23)
CALCIUM SERPL-MCNC: 9.5 MG/DL (ref 8.3–10.6)
CARBON MONOXIDE, BLOOD: 1.9 % (ref 0–5)
CHLORIDE BLD-SCNC: 97 MMOL/L (ref 99–110)
CO2 CONTENT: 33.3 MMOL/L (ref 19–24)
CO2: 33 MMOL/L (ref 21–32)
COMMENT: ABNORMAL
CREAT SERPL-MCNC: 1.1 MG/DL (ref 0.9–1.3)
ESTIMATED AVERAGE GLUCOSE: 206 MG/DL
GFR AFRICAN AMERICAN: >60 ML/MIN/1.73M2
GFR NON-AFRICAN AMERICAN: >60 ML/MIN/1.73M2
GLUCOSE BLD-MCNC: 155 MG/DL (ref 70–99)
GLUCOSE BLD-MCNC: 159 MG/DL (ref 70–99)
GLUCOSE BLD-MCNC: 169 MG/DL (ref 70–99)
GLUCOSE BLD-MCNC: 192 MG/DL (ref 70–99)
GLUCOSE BLD-MCNC: 331 MG/DL (ref 70–99)
GLUCOSE BLD-MCNC: 335 MG/DL (ref 70–99)
HBA1C MFR BLD: 8.8 % (ref 4.2–6.3)
HCO3 ARTERIAL: 31.8 MMOL/L (ref 18–23)
MAGNESIUM: 1.8 MG/DL (ref 1.8–2.4)
METHEMOGLOBIN ARTERIAL: 1.5 %
O2 SATURATION: 96.2 % (ref 96–97)
PCO2 ARTERIAL: 49 MMHG (ref 32–45)
PH BLOOD: 7.42 (ref 7.34–7.45)
PHOSPHORUS: 3.5 MG/DL (ref 2.5–4.9)
PO2 ARTERIAL: 117 MMHG (ref 75–100)
POTASSIUM SERPL-SCNC: 4.1 MMOL/L (ref 3.5–5.1)
SODIUM BLD-SCNC: 136 MMOL/L (ref 135–145)

## 2021-05-17 PROCEDURE — 36415 COLL VENOUS BLD VENIPUNCTURE: CPT

## 2021-05-17 PROCEDURE — 82803 BLOOD GASES ANY COMBINATION: CPT

## 2021-05-17 PROCEDURE — 6370000000 HC RX 637 (ALT 250 FOR IP): Performed by: NURSE PRACTITIONER

## 2021-05-17 PROCEDURE — 80069 RENAL FUNCTION PANEL: CPT

## 2021-05-17 PROCEDURE — 99233 SBSQ HOSP IP/OBS HIGH 50: CPT | Performed by: INTERNAL MEDICINE

## 2021-05-17 PROCEDURE — 83036 HEMOGLOBIN GLYCOSYLATED A1C: CPT

## 2021-05-17 PROCEDURE — 6370000000 HC RX 637 (ALT 250 FOR IP): Performed by: STUDENT IN AN ORGANIZED HEALTH CARE EDUCATION/TRAINING PROGRAM

## 2021-05-17 PROCEDURE — APPSS60 APP SPLIT SHARED TIME 46-60 MINUTES: Performed by: NURSE PRACTITIONER

## 2021-05-17 PROCEDURE — 2700000000 HC OXYGEN THERAPY PER DAY

## 2021-05-17 PROCEDURE — 83735 ASSAY OF MAGNESIUM: CPT

## 2021-05-17 PROCEDURE — 94761 N-INVAS EAR/PLS OXIMETRY MLT: CPT

## 2021-05-17 PROCEDURE — 6360000002 HC RX W HCPCS: Performed by: STUDENT IN AN ORGANIZED HEALTH CARE EDUCATION/TRAINING PROGRAM

## 2021-05-17 PROCEDURE — 96372 THER/PROPH/DIAG INJ SC/IM: CPT

## 2021-05-17 PROCEDURE — 2580000003 HC RX 258: Performed by: STUDENT IN AN ORGANIZED HEALTH CARE EDUCATION/TRAINING PROGRAM

## 2021-05-17 PROCEDURE — 82962 GLUCOSE BLOOD TEST: CPT

## 2021-05-17 RX ORDER — NICOTINE POLACRILEX 4 MG
15 LOZENGE BUCCAL PRN
Status: DISCONTINUED | OUTPATIENT
Start: 2021-05-17 | End: 2021-05-17 | Stop reason: HOSPADM

## 2021-05-17 RX ORDER — DEXTROSE MONOHYDRATE 25 G/50ML
12.5 INJECTION, SOLUTION INTRAVENOUS PRN
Status: DISCONTINUED | OUTPATIENT
Start: 2021-05-17 | End: 2021-05-17 | Stop reason: HOSPADM

## 2021-05-17 RX ORDER — DEXTROSE MONOHYDRATE 50 MG/ML
100 INJECTION, SOLUTION INTRAVENOUS PRN
Status: DISCONTINUED | OUTPATIENT
Start: 2021-05-17 | End: 2021-05-17 | Stop reason: HOSPADM

## 2021-05-17 RX ORDER — MAGNESIUM OXIDE 400 MG/1
400 TABLET ORAL DAILY
Status: DISCONTINUED | OUTPATIENT
Start: 2021-05-17 | End: 2021-05-17 | Stop reason: HOSPADM

## 2021-05-17 RX ADMIN — ASPIRIN 81 MG: 81 TABLET, COATED ORAL at 08:41

## 2021-05-17 RX ADMIN — LEVOTHYROXINE SODIUM 50 MCG: 0.05 TABLET ORAL at 06:24

## 2021-05-17 RX ADMIN — ATORVASTATIN CALCIUM 20 MG: 20 TABLET, FILM COATED ORAL at 08:41

## 2021-05-17 RX ADMIN — DILTIAZEM HYDROCHLORIDE 180 MG: 180 CAPSULE, COATED, EXTENDED RELEASE ORAL at 08:41

## 2021-05-17 RX ADMIN — SODIUM CHLORIDE, PRESERVATIVE FREE 10 ML: 5 INJECTION INTRAVENOUS at 08:42

## 2021-05-17 RX ADMIN — ENOXAPARIN SODIUM 40 MG: 40 INJECTION SUBCUTANEOUS at 08:42

## 2021-05-17 RX ADMIN — DULOXETINE HYDROCHLORIDE 30 MG: 30 CAPSULE, DELAYED RELEASE ORAL at 08:41

## 2021-05-17 ASSESSMENT — PAIN DESCRIPTION - PROGRESSION
CLINICAL_PROGRESSION: NOT CHANGED

## 2021-05-17 ASSESSMENT — ENCOUNTER SYMPTOMS: EYE DISCHARGE: 1

## 2021-05-17 NOTE — PROGRESS NOTES
[facility] Liaison spoke pt & spouse & is aware of discharge & will initiate Ying Garcia.

## 2021-05-17 NOTE — PLAN OF CARE
Problem: Falls - Risk of:  Goal: Will remain free from falls  Description: Will remain free from falls  Outcome: Ongoing  Goal: Absence of physical injury  Description: Absence of physical injury  Outcome: Ongoing     Problem: Pain:  Goal: Pain level will decrease  Description: Pain level will decrease  Outcome: Ongoing  Goal: Control of acute pain  Description: Control of acute pain  Outcome: Ongoing  Goal: Control of chronic pain  Description: Control of chronic pain  Outcome: Ongoing  Problem: DISCHARGE BARRIERS  Goal: Patient's continuum of care needs are met  Outcome: Ongoing     Problem: KNOWLEDGE DEFICIT  Goal: Patient/S.O. demonstrates understanding of disease process, treatment plan, medications, and discharge instructions.   Outcome: Ongoing     Problem: DAILY CARE  Goal: Daily care needs are met  Outcome: Ongoing     Problem: Tissue Perfusion - Cardiopulmonary, Altered:  Goal: Absence of angina  Description: Absence of angina  Outcome: Ongoing     Problem: Serum Glucose Level - Abnormal:  Goal: Ability to maintain appropriate glucose levels will improve  Description: Ability to maintain appropriate glucose levels will improve  Outcome: Ongoing        Problem: Discharge Planning:  Goal: Discharged to appropriate level of care  Description: Discharged to appropriate level of care  Outcome: Ongoing

## 2021-05-17 NOTE — PROGRESS NOTES
Cardiology Progress Note     Today's Plan: Magnesium level and 30 day event monitor     Admit Date:  5/14/2021    Consult reason/ Seen today for: chest pain    Subjective and  Overnight Events:  Report      Assessment / Plan / Recommendation:     1. Chest pain: reproduceable. Troponin are negative. EKG does not have ischemic changes. No further cardiac workup planned. 2. Vtach: Monomorphic VT 19 beats, recent LHC did not show any new significant stenosis. Will get magnesium level and 30 day event monitor. 3. CAD: Recent LHC/ Stress reviewed by Dr. Darryl Nowak. Chelsi Anderson with CCB, statin, ASA. 4. History of COPD, pulmonary fibrosis, and lung cancer. SP lung resection. Pain with deep inspiration at location of lobectomy. 5. HTN: stable, tolerating cardizem 180 mg daily. 6. DM: per primary  7. DVT prophylaxis if not contraindicated while in the hospital.          History of Presenting Illness:    Chief complain on admission : 79 y. o.year old who is admitted for  Chief Complaint   Patient presents with    Hyperglycemia     prednisone x2 weeks,  per EMS    Rib Pain     left, fall 2 days ago        Past medical history:    has a past medical history of Arthritis, Asthma, Back pain, CAD (coronary artery disease), cardiovascular stress test, Diabetes mellitus ([facility] [address] echocardiogram, Glaucoma, H/O 24 hour EKG monitoring, H/O cardiac catheterization, H/O cardiovascular stress test, H/O chest pain, H/O chest x-ray, H/O dizziness, H/O Doppler ultrasound, H/O echocardiogram, H/O gastroesophageal reflux (GERD), H/O pneumothorax, History of cardiac monitoring, History of complete ECG, History of exercise stress test, History of stress test, [place] (hard of hearing), Hx of CT scan of chest, Hx of Doppler echocardiogram, Hyperlipidemia, Hypertension, Left Lung Cancer, Lung nodule, Memory loss, MVP (mitral valve prolapse), Old MI (myocardial infarction), Shortness of breath on exertion, Teeth missing, Thyroid disease, Wears dentures, Wears glasses, and Wears hearing aid. Past surgical history:   has a past surgical history that includes bronchoscopy (N/A, 12/7/2018); eye surgery (Bilateral, 2000's); Dental surgery; knee surgery (Left, 1980's Or 1990's); Knee arthroscopy (Bilateral, 1970's To 1990's); Vasectomy (1980's); Cardiac catheterization (1980's Or 1990's); Colonoscopy (Last Done In 2006); Cholecystectomy, laparoscopic (2002); Lung removal, total (Left, 1/28/2019); and Colonoscopy (N/A, 4/9/2019). Social History:   reports that he has never smoked. He has never used smokeless tobacco. He reports that he does not drink alcohol and does not use drugs. Family history:  family history includes COPD in his sister; Diabetes in his brother; Heart Attack in his father; Heart Disease in his mother; High Blood Pressure in his mother; Stroke in his father. Allergies   Allergen Reactions    Norco [Hydrocodone-Acetaminophen] Other (See Comments)     Drops BP. Review of Systems   Eyes: Positive for discharge. All 14 systems were reviewed and are negative  Except for the positive findings  which as documented     /86   Pulse 72   Temp 97.9 °F (36.6 °C) (Oral)   Resp 20   Ht 6' (1.829 m)   Wt 189 lb (85.7 kg)   SpO2 98%   BMI 25.63 kg/m²       Intake/Output Summary (Last 24 hours) at 5/17/2021 0850  Last data filed at 5/16/2021 1720  Gross per 24 hour   Intake 360 ml   Output 175 ml   Net 185 ml       Physical Exam  Vitals reviewed. Constitutional:       General: He is not in acute distress. Appearance: Normal appearance. He is not ill-appearing. HENT:      Head: Atraumatic. Neck:      Vascular: No carotid bruit. Cardiovascular:      Rate and Rhythm: Normal rate and regular rhythm. Pulses: Normal pulses. Heart sounds: Normal heart sounds. No murmur heard. Pulmonary:      Effort: Pulmonary effort is normal. No respiratory distress.       Breath sounds: Normal breath sounds. Musculoskeletal:         General: No swelling or deformity. Cervical back: Neck supple. No muscular tenderness. Neurological:      Mental Status: He is alert. Telemetry Reviewed:   Sinus rhythm    Medications:    insulin lispro  0-6 Units Subcutaneous TID WC    insulin lispro  0-3 Units Subcutaneous Nightly    dilTIAZem  180 mg Oral Daily    aspirin  81 mg Oral QAM    atorvastatin  20 mg Oral Daily    donepezil  10 mg Oral Nightly    DULoxetine  30 mg Oral Daily    levothyroxine  50 mcg Oral Daily    sodium chloride flush  5-40 mL Intravenous 2 times per day    enoxaparin  40 mg Subcutaneous Daily    lidocaine  1 patch Transdermal Daily      dextrose      dextrose      sodium chloride       glucose, dextrose, glucagon (rDNA), dextrose, melatonin, ketorolac, ipratropium-albuterol, glucose, dextrose, glucagon (rDNA), dextrose, sodium chloride flush, sodium chloride, acetaminophen **OR** acetaminophen, polyethylene glycol, nitroGLYCERIN    Lab Data:  CBC:   Recent Labs     05/14/21  2022 05/15/21  0336 05/16/21  0422   WBC 11.8* 12.0* 10.0   HGB 13.8 12.8* 12.9*   HCT 42.4 39.4* 40.0*   MCV 96.8 94.9 95.7    172 160     BMP:   Recent Labs     05/14/21  2022 05/15/21  0336 05/16/21  0422   * 136 139   K 4.5 4.3 4.0   CL 93* 98* 101   CO2 26 29 28   BUN 27* 24* 27*   CREATININE 1.1 1.0 1.2     PT/INR: No results for input(s): PROTIME, INR in the last 72 hours. BNP:    Recent Labs     05/14/21 2022   PROBNP 132.3     TROPONIN:   Recent Labs     05/14/21 2022 05/14/21 2324 05/15/21  0336   TROPONINT <0.010 <0.010 <0.010        ECHO :   Echocardiogram 11/3/2020  Summary   Limited study due to patients body habitus lung surgery. Left ventricular function is low normal, EF is estimated at 50-55%. Mild left ventricular hypertrophy. Normal diastolic filling pattern for age. Mildly dilated left atrium. Mild tricuspid regurgitation; RVSP is 32 mmHg.    No significant valvular disease noted. No evidence of pericardial effusion. All labs, medications and tests reviewed by myself , continue all other medications of all above medical condition listed as is except for changes mentioned above. Thank you very much for consult , please call with questions. Electronically signed by Rodriguez Madison. ASHLEE Marie - CNP on 5/17/2021 at 8:50 AM          CARDIOLOGY ATTENDING ADDENDUM    I have seen, spoken to and examined this patient personally, independently of the nurse practitioner. I have reviewed the hospital care given to date and reviewed all pertinent labs and imaging. The plan was developed mutually at the time of the visit with the patient,  NP   and myself. I have spoken with patient, nursing staff and provided written and verbal instructions . The above note has been reviewed and I agree with the assessment, diagnosis, and treatment plan with changes made by me as follows       HPI:  I have reviewed the above HPI  And agree with above   Please review addendum/changes made to note above     Interval history:            Physical Exam:  General:  Awake, alert, NAD  Head:normal  Eye:normal  Neck:  No JVD   Chest:  Clear to auscultation, respiration easy  Cardiovascular:  s1s2  Abdomen:   nontender  Extremities:  trace edema  Pulses; palpable  Neuro: grossly normal      MEDICAL DECISION MAKING;    I agree with the above plan, which was planned by myself and discussed with NP. Nonsustained V. tach 19 beats. Recent left heart cath did not show any significant coronary stenosis  Hypomagnesemia likely culprit  Start patient on oral magnesium oxide 400 mg daily  Case was discussed with hospitalist team  Cardiology will sign off.   Please call us with any further questions    Dr. Maxx Horton MD

## 2021-05-17 NOTE — PROGRESS NOTES
Pt ambulated in fernandez for 20 feet before taking a break to sit in wheelchair. Pt rested and then walked an additional 10 feet. Dr. Andi Fairchild present during this activity.  Home care orders received from Dr. Andi Fairchild.

## 2021-05-17 NOTE — DISCHARGE SUMMARY
Discharge Summary    Name:  Alli Martinez /Age/Sex: 1940  [de-identified]79 y.o. male)   MRN & CSN:  [id number] & [id number] Admission Date/Time: 2021  7:15 PM   Attending:  Carrie Duffy MD Discharging Physician: Geovanna Maddox MD     HPI and Hospital Course:   Alli Martinez is a 79 y.o.  male  who presented with chest pain    HPI- as per [address]  -Chest pain- likely non-cardiac, seen by cardio-recent work up was negative- recommended continued medical management. Had NSVT for which will have event monitor per cardio  -chronic respiratory failure with debility- underlying pulmonary fibrosis- per discussion with patient and wife this appears advanced- recent prednisone did not help and do not want to continue with taper, discussion ongoing with his pulmonologist per wife on [address] - may transition to outpatient palliative- she will follow up with office-overall patient has very poor functional capacity-activity limited with dyspnea-does not want rehab due to previous bad experience, opting for [facility] which is ordered.  -Hyperglycemia  Improved- was due to prednisone which is stopped. The patient expressed appropriate understanding of and agreement with the discharge recommendations, medications, and plan.      Consults this admission:  IP CONSULT TO HOSPITALIST  IP CONSULT TO CARDIOLOGY    Discharge Instruction:   Follow up appointments:   Primary care physician:  within 2 weeks    Diet:  General/cardiac/ADA/as tolerated  Activity: {discharge activity: as tolerated  Disposition: Discharged to:   [x]Home, [x]C, []SNF, []Acute Rehab, []Hospice   Condition on discharge: Stable    Discharge Medications:      Dennise Mckeon   Home Medication Instructions ERROL:[id number]    Printed on:21 1258   Medication Information                      aspirin 81 MG EC tablet  Take 1 tablet by mouth every morning Over The Counter             atorvastatin (LIPITOR) 20 MG tablet  Take 1 tablet by mouth daily             Cholecalciferol (VITAMIN D3) 5000 units TABS  Take by mouth every morning Over The Counter             dilTIAZem (CARDIZEM CD) 120 MG extended release capsule  Take 1 capsule by mouth daily             donepezil (ARICEPT) 10 MG tablet  Take 1 tablet by mouth nightly             DULoxetine (CYMBALTA) 30 MG extended release capsule  Take 30 mg by mouth daily             levothyroxine (SYNTHROID) 50 MCG tablet  Take 1 tablet by mouth Daily             pioglitazone (ACTOS) 45 MG tablet  Take 1 tablet by mouth daily             sertraline (ZOLOFT) 100 MG tablet  Take 1 tablet by mouth daily             SITagliptin (JANUVIA) 100 MG tablet  Take 1 tablet by mouth daily                 Objective Findings at Discharge:   /86   Pulse 72   Temp 97.9 °F (36.6 °C) (Oral)   Resp 20   Ht 6' (1.829 m)   Wt 189 lb (85.7 kg)   SpO2 98%   BMI 25.63 kg/m²            PHYSICAL EXAM   GEN Awake male, laying in bed in no apparent distress. EYES Pupils are equally round. No scleral discharge  HENT Atraumatic and symmetric head  NECK No apparent thyromegaly  RESP Symmetric chest movement   CARDIO/VASC Peripheral pulses equal bilaterally and palpable. GI Abdomen is not distended. Rectal exam deferred.  Garay catheter is not present. HEME/LYMPH No petechiae or ecchymoses. MSK Spontaneous movement of BL upper extremities  SKIN Normal coloration, warm, dry. NEURO Cranial nerves appear grossly intact  PSYCH Awake, alert.     BMP/CBC  Recent Labs     05/14/21  2022 05/15/21  0336 05/16/21  0422   * 136 139   K 4.5 4.3 4.0   CL 93* 98* 101   CO2 26 29 28   BUN 27* 24* 27*   CREATININE 1.1 1.0 1.2   WBC 11.8* 12.0* 10.0   HCT 42.4 39.4* 40.0*    172 160     SIGNIFICANT IMAGING AND LABS:      Discharge Time of 32 minutes    Electronically signed by Vane Naik MD on 5/17/2021 at 12:58 PM

## 2021-05-17 NOTE — CARE COORDINATION
CM in to initiate discharge planning though pt declines stating he wishes to wait for his wife to be present to discuss any potential needs. CM will re-attempt. 1:40 PM  Chart reviewed, in to see pt and his wife/Gloria for dc planning. Introduced self and role explained. Pt was admitted for CP and he is sleeping so spoke with wife for dc planning. States they lives at home together and [facility] preference of [address]. Explained he follows with PCP, has insurance with affordable RX co-pays and reliable transportation per his wife. CM remains available if needs arise.        2:12 PM  PS to [facility] with [facility] to provide referral.

## 2021-05-20 ENCOUNTER — TELEPHONE (OUTPATIENT)
Dept: CARDIOLOGY CLINIC | Age: 81
End: 2021-05-20

## 2021-05-20 DIAGNOSIS — I25.10 ASCVD (ARTERIOSCLEROTIC CARDIOVASCULAR DISEASE): Primary | ICD-10-CM

## 2021-05-20 DIAGNOSIS — I20.8 ANGINA AT REST (HCC): ICD-10-CM

## 2021-05-20 DIAGNOSIS — R42 DIZZINESS: ICD-10-CM

## 2021-05-20 RX ORDER — MIDODRINE HYDROCHLORIDE 5 MG/1
5 TABLET ORAL 2 TIMES DAILY
Qty: 90 TABLET | Refills: 3 | Status: SHIPPED | OUTPATIENT
Start: 2021-05-20 | End: 2021-06-01

## 2021-06-01 ENCOUNTER — OFFICE VISIT (OUTPATIENT)
Dept: CARDIOLOGY CLINIC | Age: 81
End: 2021-06-01
Payer: MEDICARE

## 2021-06-01 VITALS
BODY MASS INDEX: 25.6 KG/M2 | HEIGHT: 72 IN | DIASTOLIC BLOOD PRESSURE: 62 MMHG | HEART RATE: 73 BPM | SYSTOLIC BLOOD PRESSURE: 100 MMHG | WEIGHT: 189 LBS

## 2021-06-01 DIAGNOSIS — E11.69 HYPERLIPIDEMIA ASSOCIATED WITH TYPE 2 DIABETES MELLITUS (HCC): ICD-10-CM

## 2021-06-01 DIAGNOSIS — R53.82 CHRONIC FATIGUE: ICD-10-CM

## 2021-06-01 DIAGNOSIS — R62.51 FAILURE TO THRIVE (0-17): ICD-10-CM

## 2021-06-01 DIAGNOSIS — I95.1 ORTHOSTATIC HYPOTENSION: ICD-10-CM

## 2021-06-01 DIAGNOSIS — E66.3 OVERWEIGHT (BMI 25.0-29.9): ICD-10-CM

## 2021-06-01 DIAGNOSIS — R06.02 SOB (SHORTNESS OF BREATH) ON EXERTION: ICD-10-CM

## 2021-06-01 DIAGNOSIS — R06.02 SOB (SHORTNESS OF BREATH): ICD-10-CM

## 2021-06-01 DIAGNOSIS — R07.89 OTHER CHEST PAIN: Primary | ICD-10-CM

## 2021-06-01 DIAGNOSIS — G47.33 SEVERE OBSTRUCTIVE SLEEP APNEA: ICD-10-CM

## 2021-06-01 DIAGNOSIS — R27.0 ATAXIA: ICD-10-CM

## 2021-06-01 DIAGNOSIS — I25.110 CORONARY ARTERY DISEASE INVOLVING NATIVE CORONARY ARTERY OF NATIVE HEART WITH UNSTABLE ANGINA PECTORIS (HCC): ICD-10-CM

## 2021-06-01 DIAGNOSIS — I25.10 ASCVD (ARTERIOSCLEROTIC CARDIOVASCULAR DISEASE): ICD-10-CM

## 2021-06-01 DIAGNOSIS — E78.5 HYPERLIPIDEMIA ASSOCIATED WITH TYPE 2 DIABETES MELLITUS (HCC): ICD-10-CM

## 2021-06-01 DIAGNOSIS — I34.1 MVP (MITRAL VALVE PROLAPSE): ICD-10-CM

## 2021-06-01 DIAGNOSIS — J90 PLEURAL EFFUSION: ICD-10-CM

## 2021-06-01 PROCEDURE — 99214 OFFICE O/P EST MOD 30 MIN: CPT | Performed by: INTERNAL MEDICINE

## 2021-06-01 PROCEDURE — 3052F HG A1C>EQUAL 8.0%<EQUAL 9.0%: CPT | Performed by: INTERNAL MEDICINE

## 2021-06-01 RX ORDER — DILTIAZEM HYDROCHLORIDE 120 MG/1
CAPSULE, COATED, EXTENDED RELEASE ORAL
Qty: 90 CAPSULE | Refills: 1 | OUTPATIENT
Start: 2021-06-01

## 2021-06-01 RX ORDER — MIDODRINE HYDROCHLORIDE 5 MG/1
5 TABLET ORAL 3 TIMES DAILY
Qty: 90 TABLET | Refills: 3 | Status: SHIPPED | OUTPATIENT
Start: 2021-06-01 | End: 2021-06-30 | Stop reason: SDUPTHER

## 2021-06-01 NOTE — PATIENT INSTRUCTIONS
Please be informed that if you contact our office outside of normal business hours the physician on call cannot help with any scheduling or rescheduling issues, procedure instruction questions or any type of medication issue. We advise you for any urgent/emergency that you go to the nearest emergency room! PLEASE CALL OUR OFFICE DURING NORMAL BUSINESS HOURS    Monday - Friday   8 am to 5 pm    [place]: [address]: [phone]    [place]:  [phone]  **It is YOUR responsibilty to bring medication bottles and/or updated medication list to [address] [place] [address].  This will allow us to better serve you and all your healthcare needs**

## 2021-06-01 NOTE — PROGRESS NOTES
CARDIOLOGY  NOTE    Chief Complaint: Chest pain and shortness of breath    HPI:   Jorge Luis Mistry is a 79y.o. year old who has history as noted below. Mr. Garcia Adjutant is here with his wife, his blood pressure at home has been dropping by 10-15 points on standing up he is very symptomatic due to dizziness. He is extremely short of breath is on oxygen around-the-clock  oxygen saw pulmonology and had a CT scan done it appears that he has progressive lung disease and interstitial lung fibrosis with emphysema and pleural effusion. Blood sugars are higher on prednisone , Bp is very erratic and he is very orthostatic with occasional drop in bp to 90/40's HE is being evaluated by palliative care . HE got compression stockings but they are not tight enough . Getting physical therapy at home  In office today his blood pressure drops by 10-15 points on standing    [place] had cardiac cath in November 2020 showing patent stents and mild to moderate disease. Post Lexiscan infusion he had runs of atrial tachycardia however 30 day monitor did not show any A. fib or atrial arrhythmias except for 3-5 beat of wide-complex tachycardia which was not symptomatic he gets winded and short of breath on minimal exertion. Wife reports that he does not sleep well at night he is restless tired all the time    Stress test unfortunately shows anterior wall large area of ischemia but his cardiac cath revealed patent LAD stent he does have ostial 20 to 30% stenosis. Circumflex and RCA were patent his pulmonary artery pressure was a mean of 15 mmhg and a systolic pressure of 35 wedge  was 11     He status post lung resection. He continues struggle with significant shortness of breath and episodes of tachycardia. His pulmonologist raised a valid question whether he is going into A. fib ? But his 30-day event monitor did not show any arrhythmias as mentioned above   HE is on O2 ATC .   When he was in cardiac rehab he would have frequent low blood pressures in the 80s although is not going to cardiac rehab anymore . He had LAD stent due to abnormal FFR in April 2019 .he is struggling with depression as well. . He had lung surgery due to lung mass and Jan 2019. He says that he has not felt good since then    Current Outpatient Medications   Medication Sig Dispense Refill    midodrine (PROAMATINE) 5 MG tablet Take 1 tablet by mouth 3 times daily 90 tablet 3    Compression Stockings MISC by Does not apply route 15 to 20 mmhg 1 each 0    DULoxetine (CYMBALTA) 30 MG extended release capsule Take 30 mg by mouth daily      dilTIAZem (CARDIZEM CD) 120 MG extended release capsule Take 1 capsule by mouth daily 30 capsule 5    aspirin 81 MG EC tablet Take 1 tablet by mouth every morning Over The Counter 90 tablet 1    donepezil (ARICEPT) 10 MG tablet Take 1 tablet by mouth nightly 90 tablet 1    atorvastatin (LIPITOR) 20 MG tablet Take 1 tablet by mouth daily 90 tablet 1    pioglitazone (ACTOS) 45 MG tablet Take 1 tablet by mouth daily 90 tablet 1    SITagliptin (JANUVIA) 100 MG tablet Take 1 tablet by mouth daily 90 tablet 1    levothyroxine (SYNTHROID) 50 MCG tablet Take 1 tablet by mouth Daily 90 tablet 1    Cholecalciferol (VITAMIN D3) 5000 units TABS Take by mouth every morning Over The Counter       No current facility-administered medications for this visit. Allergies:   Norco [hydrocodone-acetaminophen]    Patient History:  Past Medical History:   Diagnosis Date    Arthritis     \"Back, Knees\"    Asthma     Back pain     \"At Times\"    CAD (coronary artery disease)     cardiovascular stress test 10/15/2020    possible afib or atrial flutter post infusion severe anterior medium to large ischemia     Diabetes mellitus ([address]     dx 5+ yrs ago-     echocardiogram 11/03/2020    EF 50-55% mild tricuspid regurg no significant valvular disease noted.      Glaucoma     Bilateral Eyes    H/O 24 hour EKG monitoring 11-    Predominantly SR - avg rate of 69bpm. Lowest rate 46 bpm at 0622. Frequent isolated 5954 PVCs noted mostly in bigemenal pattern w/out complex arrhythmias. Five beat run of atrial tachycardia at 1228. No palpitations or dizziness reported in patient's daily activity report. (12-, )    H/O cardiac catheterization 06-    Noncritical diffuse CAD w/no critical stenosis. Diag borderline significant stenosis, not large enough fo percutaneous intervention. No significant angiographic progression of atherosclerosis since cath in 1997.   ( per Dr. Palmer Johnston at [facility]. Preserved vent function. MVP. CAD w/normal LM, 30-40% stenosis LAD, 70-80% stenosis of ostium & prox seg diag branch, normal left CX & RCA.)    H/O cardiovascular stress test 1/30/2013, 06- 1/30/2013-Normal study. Normal perfusion in all regions. EF 62%. 06--EF=60%. Normal. Normal pattern of perfusion. LV normal size. No wall abnormality. Exercise capacity good. (05-, 12-, 06-, , )    H/O chest pain     H/O chest x-ray 06-    Negative. Dx was dyspnea and CAD.  (02-)    H/O dizziness     H/O Doppler ultrasound 11-    (Carotid) Intimal thickening but no significant atherosclerotic plaque noted in right or left ICA. Doppler flow velocities w/in right and left ICA WNL.  H/O echocardiogram 1/30/2013, 05-    1/30/2013- LVSF normal. EF 50-55%. Impaired LV relaxation. Trace MR. Trace TR;   5- Normal LV size and systolic function. EF=60%. Chamber dimensions WNL. Mild concentric LV hypertrophy.  Valves appear structurally normal.-OICC       H/O gastroesophageal reflux (GERD)     H/O pneumothorax Dx 1960    \"Both Sides\"    History of cardiac monitoring 12/03/2018    9 beat run of SVT, no other arrhythmias noted, primarily sinus rythym    History of complete ECG 06/06/2011    (06-, 07-, 06-, 06-)    History of exercise stress test 05/21/2019    Treadmill, Stopped due to fatigue and  Dyspnea.  History of stress test 11/07/2018    EF 61%, Normal    [place] (hard of hearing)     Bilateral Hearing Aids    Hx of CT scan of chest 11/12/2018    3 mm indeterminate solid nonobstructing endobronchial nodule of the proximal left upper lobe bronchus. Further evaluation with endoscopy is recommended. Bilateral basilar predominant intersitial changes suggestive of nonspecific interstitial pneumonitis.     Hx of Doppler echocardiogram 11/20/2018    EF55-60%,no valvular disease    Hyperlipidemia     Hypertension     Left Lung Cancer Dx 11-18    LEFT UPPER LOBECTOMY 1/28/19    Lung nodule     \"they say I have a spot on my lung- and mass in left lung so thats why doing the bronch\"( 12/7/2018)    Memory loss     MVP (mitral valve prolapse)     follows with Dr José Lyle (myocardial infarction)     \"had heart attack 30 yrs ago a mild one\"    Shortness of breath on exertion     Teeth missing     Upper And Lower    Thyroid disease     Wears dentures     Full Upper    Wears glasses     Wears hearing aid     Bilateral Ears     Past Surgical History:   Procedure Laterality Date    BRONCHOSCOPY N/A 12/7/2018    BRONCHOSCOPY/TRANSBRONCHIAL LUNG BIOPSY performed by Tameka Mancilla MD at [facility]  60' Or 1990's    CHOLECYSTECTOMY, LAPAROSCOPIC  2002    COLONOSCOPY  Last Done In 2006    Polyps Removed In Past    COLONOSCOPY N/A 4/9/2019    COLONOSCOPY DIAGNOSTIC performed by Luzma Merritt MD at [address] [place] [address]      Teeth Extracted In Past    EYE SURGERY Bilateral 2000's    \"Laser For Glaucoma\"    KNEE ARTHROSCOPY Bilateral 1970's To 1990's    \"Numerous\"    KNEE SURGERY Left 1980's Or 1990's    LUNG REMOVAL, TOTAL Left 1/28/2019    THORACOTOMY LEFT UPPER LOBECTOMY ROBOTIC ASSISTED performed by Sarina Rankin MD at [address], operated by [facility]  1980's     Family History Problem Relation Age of Onset    Heart Disease Mother         Enlarged Heart    High Blood Pressure Mother     Heart Attack Father     Stroke Father     COPD Sister     Diabetes Brother      Social History     Tobacco Use    Smoking status: Never Smoker    Smokeless tobacco: Never Used   Substance Use Topics    Alcohol use: Never        Review of Systems:   · Constitutional: No Fever or Weight Loss   · Eyes: No Decreased Vision  · ENT: No Headaches, Hearing Loss or Vertigo  · Cardiovascular: as per note above   · Respiratory: as per HPI  · Gastrointestinal: No abdominal pain, appetite loss, blood in stools, constipation, diarrhea or heartburn  · Genitourinary: No dysuria, trouble voiding, or hematuria  · Musculoskeletal:  None  · Integumentary: No rash or pruritis  · Neurological: No TIA or stroke symptoms  · Psychiatric: insomnia  · Endocrine: No malaise, fatigue or temperature intolerance  · Hematologic/Lymphatic: No bleeding problems, blood clots or swollen lymph nodes  · Allergic/Immunologic: No nasal congestion or hives    Objective:      Physical Exam:  /62 (Site: Left Upper Arm, Position: Standing, Cuff Size: Medium Adult)   Pulse 73   Ht 6' (1.829 m)   Wt 189 lb (85.7 kg)   BMI 25.63 kg/m²   Wt Readings from Last 3 Encounters:   06/01/21 189 lb (85.7 kg)   05/17/21 189 lb (85.7 kg)   11/23/20 204 lb (92.5 kg)     Body mass index is 25.63 kg/m². Vitals:    06/01/21 1117   BP: 100/62   Pulse: 73        General Appearance:  No distress, conversant needs assistance getting out of chair is frail and weak in the proximal thigh muscles  Constitutional:  Well developed, Well nourished, No acute distress, Non-toxic appearance. HENT:  Normocephalic, Atraumatic, Bilateral external ears normal, Oropharynx moist, No oral exudates, Nose normal. Neck- Normal range of motion, No tenderness, Supple, No stridor,no apical-carotid delay  Eyes:  PERRL, EOMI, Conjunctiva normal, No discharge. Respiratory:  Normal breath sounds, No respiratory distress, No wheezing, No chest tenderness. ,no use of accessory muscles, NO crackles  Cardiovascular: (PMI) apex non displaced,no lifts no thrills,S1 and S2 audible, No added heart sounds, No signs of ankle edema, or volume overload, No evidence of JVD, No crackles  GI:  Bowel sounds normal, Soft, No tenderness, No masses, No gross visceromegaly   :  No costovertebral angle tenderness   Musculoskeletal:  No edema, no tenderness, no deformities.  Back- no tenderness  Integument:  Well hydrated, no rash   Lymphatic:  No lymphadenopathy noted   Neurologic:  Alert & oriented x 3, CN 2-12 normal, weak proximal thigh muscle, normal sensory function, no focal deficits noted   Psychiatric:  Speech and behavior appropriate       Medical decision making and Data review:  DATA:  Lab Results   Component Value Date    TROPONINT <0.010 05/15/2021     BNP:    Lab Results   Component Value Date    PROBNP 132.3 05/14/2021     PT/INR:  No results found for: PTINR  Lab Results   Component Value Date    LABA1C 8.8 (H) 05/17/2021    LABA1C 8.9 (H) 05/14/2021     Lab Results   Component Value Date    CHOL 117 11/02/2018    TRIG 178 (H) 11/02/2018    HDL 43 11/02/2018    LDLCALC 54 03/12/2013     Lab Results   Component Value Date    ALT 30 05/16/2021    AST 30 05/16/2021     TSH:   Lab Results   Component Value Date    TSH 2.370 11/02/2018     Lab Results   Component Value Date    AST 30 05/16/2021    ALT 30 05/16/2021    BILIDIR 0.3 05/14/2021    BILITOT 0.6 05/16/2021    ALKPHOS 61 05/16/2021     Lab Results   Component Value Date    PROBNP 132.3 05/14/2021    PROBNP 404.4 (H) 07/02/2019    PROBNP 140.2 02/24/2019     Lab Results   Component Value Date    LABA1C 8.8 (H) 05/17/2021    LABA1C 8.9 (H) 05/14/2021     Lab Results   Component Value Date    WBC 10.0 05/16/2021    HGB 12.9 (L) 05/16/2021    HCT 40.0 (L) 05/16/2021     05/16/2021   echo 11*/20/18   Summary   Normal Dr. Michael Sheridan portion of stress test is negative for ischemia by diagnostic criteria.     Possible afib or atrial flutter post infusion , resolved within a minute     Severe anterior medium to large area of ischemia     Abnormal stress test     Cath 11/6/2020    Procedure Summary   Access : Radial and femoral Vein   1. LAD stent is patent , ostial LAD has 20-30 % stenosis,   2. Circ and RCA are patent   3. LVEDP was 16 mmHG   4. PA was 35/7 mean 15 mmHG ,Pcwp was 11 mmHG   5. CO is 3.9 L/MIN   6. RV was 40/3 mmHG and RA was 7 mmHG  Angiographic Findings      Diagnostic Findings    Cardiac Arteries and Lesion Findings   LMCA: Normal (no stenosis %) and No Angiographicalyl Significant  Disease. widely patent   LAD: Diffuse irregularity. LAD stents is patent , ostial LAD has 30-40 %  disease followed by aneurismal segment and lad stent which is patent, it is  similar to cath in 2019 . 2 diag are small and diffusely diseased There is a  previous stent on Prox LAD.   LCx: No Angiographicalyl Significant Disease, Mild Lumen Irregularity, Diffuse  irregularity, Abnormal and Chronic occlusion. Circ and Om are patent   RCA: Normal (no stenosis %), No Angiographicalyl Significant Disease and Mild  Lumen Irregularity. No significant disease , PDA is small          All labs, medications and tests reviewed by myself including data and history from outside source , patient and available family . Assessment & Plan:      1. Other chest pain    2. Chronic fatigue    3. MVP (mitral valve prolapse)    4. Ataxia    5. Hyperlipidemia associated with type 2 diabetes mellitus ([address]    6. ASCVD (arteriosclerotic cardiovascular disease)    7. Overweight (BMI 25.0-29.9)    8. Severe obstructive sleep apnea    9. SOB (shortness of breath) on exertion    10. Failure to thrive (0-17)    11. Pleural effusion    12. Coronary artery disease involving native coronary artery of native heart with unstable angina pectoris ([address]    13.  SOB (shortness of breath)    14. Orthostatic hypotension       ASCVD / chest pain   Chest pain improved after PCI to LAD in April 2019 , Cath in  November 2020 showed patent LAD stent at the images were reviewed. Circumflex and RCA are patent right-sided pressures are normal pulmonary capillary wedge pressure normal.  I think his quality of life is  Getting affected a lot by his ongoing depression and orthostatic episodes we need to focus on his quality of life      Orthostatic hypotension   Multifactorial autonomic dysfunction, associated with volume status and possible neurological etiology for orthostasis ?he is not on diuretics. Aline Contes  is an option but cost is an issue  Start midodrine 5 mg 3 times daily compression stockings 15 to 20 mmHg    palpitations/tachycardia  He developed atrial rhythm versus artifact? Possible multifocal atrial tachycardia after Lexiscan infusion. I have started him on Cardizem he cannot tolerate higher doses due to orthostasis. For now continue Cardizem       Chronic respiratory failure  He has been oxygen dependent ever since his end of bronchial mass resection he is on 2 L of oxygen around-the-clock. Sees pulmonology followed very closely there is concerns for him developing interstitial lung disease I do not have the CT scan images but got the results . On echo and right heart cath pressures are normal       Dyslipidemia :  All available lab work was reviewed. Patient was advised to repeat lab work before next visit      Counseled extensively and medication compliance urged. We discussed that for the  prevention of ASCVD our  goal is aggressive risk modification. Patient is encouraged to exercise even a brisk walk for 30 minutes  at least 3 to 4 times a week   Various goals were discussed and questions answered. Continue current medications. Appropriate prescriptions are addressed and refills ordered. Questions answered and patient verbalizes understanding.   Call for any problems, questions, or concerns. Continue all other medications of all above medical condition listed as is. Return in about 1 month (around 7/1/2021). Please note this report has been partially produced using speech recognition software and may contain errors related to that system including errors in grammar, punctuation, and spelling, as well as words and phrases that may be inappropriate.  If there are any questions or concerns please feel free to contact the dictating provider for clarification.

## 2021-06-07 RX ORDER — DILTIAZEM HYDROCHLORIDE 120 MG/1
CAPSULE, COATED, EXTENDED RELEASE ORAL
Qty: 90 CAPSULE | Refills: 1 | Status: SHIPPED | OUTPATIENT
Start: 2021-06-07 | End: 2021-11-29

## 2021-06-30 ENCOUNTER — TELEPHONE (OUTPATIENT)
Dept: CARDIOLOGY CLINIC | Age: 81
End: 2021-06-30

## 2021-06-30 RX ORDER — MIDODRINE HYDROCHLORIDE 10 MG/1
10 TABLET ORAL 3 TIMES DAILY
Qty: 90 TABLET | Refills: 1 | Status: SHIPPED | OUTPATIENT
Start: 2021-06-30 | End: 2021-07-14 | Stop reason: ALTCHOICE

## 2021-06-30 NOTE — TELEPHONE ENCOUNTER
I talked to the patient and the patient and the patient stated that his bp is dropping still when he stands up. Sit: 126/86 P97  Stand: 82/61 P ? I talked to Hungkash. She states that we can up it to 10mg TID  And to get those new compression stocking that is higher in strength. I talked to the wife and she okay and thanks us.

## 2021-07-14 ENCOUNTER — OFFICE VISIT (OUTPATIENT)
Dept: CARDIOLOGY CLINIC | Age: 81
End: 2021-07-14
Payer: MEDICARE

## 2021-07-14 VITALS
HEIGHT: 72 IN | DIASTOLIC BLOOD PRESSURE: 54 MMHG | SYSTOLIC BLOOD PRESSURE: 116 MMHG | HEART RATE: 65 BPM | BODY MASS INDEX: 25.84 KG/M2 | WEIGHT: 190.8 LBS

## 2021-07-14 DIAGNOSIS — I25.10 ASCVD (ARTERIOSCLEROTIC CARDIOVASCULAR DISEASE): ICD-10-CM

## 2021-07-14 DIAGNOSIS — I95.1 ORTHOSTATIC HYPOTENSION: Primary | ICD-10-CM

## 2021-07-14 DIAGNOSIS — E78.5 DYSLIPIDEMIA: ICD-10-CM

## 2021-07-14 DIAGNOSIS — I34.1 MVP (MITRAL VALVE PROLAPSE): ICD-10-CM

## 2021-07-14 DIAGNOSIS — R06.6 INTRACTABLE HICCUPS: ICD-10-CM

## 2021-07-14 PROCEDURE — 99214 OFFICE O/P EST MOD 30 MIN: CPT | Performed by: NURSE PRACTITIONER

## 2021-07-14 RX ORDER — METOCLOPRAMIDE 10 MG/1
10 TABLET ORAL 4 TIMES DAILY
Qty: 120 TABLET | Refills: 0 | Status: SHIPPED | OUTPATIENT
Start: 2021-07-14 | End: 2021-08-18

## 2021-07-14 RX ORDER — CALCIUM SENNOSIDES 25 MG/1
TABLET ORAL
COMMUNITY
End: 2021-08-18

## 2021-07-14 RX ORDER — FLUDROCORTISONE ACETATE 0.1 MG/1
0.1 TABLET ORAL 2 TIMES DAILY
Qty: 60 TABLET | Refills: 3 | Status: SHIPPED | OUTPATIENT
Start: 2021-07-14 | End: 2021-08-13

## 2021-07-14 ASSESSMENT — ENCOUNTER SYMPTOMS
COUGH: 0
SHORTNESS OF BREATH: 0

## 2021-07-14 NOTE — PROGRESS NOTES
[facility] PHYSICAL REHABIL[facility] [place]  [address]  Phone: [phone]    Fax [phone]    Leidy Shook MD, Juan Jose Hanley MD, [address] Caitlyn Morin MD, MD Kanika Escalante MD Marla Slipper, MD Rebecca Reed, MD Chary Salvador, ASHLEE Cole, ASHLEE Aguilera, ASHLEE Russell, APRCELESTINE    CARDIOLOGY  NOTE    2021    Leni Morales (:  1940) is a 79 y.o. male,Established patient with Dr. Zach Quiroga, here for evaluation of the following chief complaint(s):  1 Month Follow-Up (f/u from Med adjustment )    SUBJECTIVE/OBJECTIVE:    Milady Martell is a 79y.o. year old who has history as noted below. Mr. Rishi Caro is here with his wife, his blood pressure at home has been dropping by 10-15 points on standing up he is very symptomatic due to dizziness. Midodrine has not helped. He is extremely short of breath is on oxygen around-the-clock  oxygen saw pulmonology and had a CT scan done it appears that he has progressive lung disease and interstitial lung fibrosis with emphysema and pleural effusion. He has been having hiccups for the last 3 days happened 1 day prior for four days continuous, only 1 day of no hiccups in the last 8 days. . Blood sugars are higher on prednisone , Bp is very erratic and he is very orthostatic with occasional drop in bp to 90/40's HE is being evaluated by palliative care . HE got compression stockings but they are not tight enough . Getting physical therapy at home  In office today he was not able to do orthostatic blood pressures due to his weakness.      [place] had cardiac cath in 2020 showing patent stents and mild to moderate disease.   Post Lexiscan infusion he had runs of atrial tachycardia however 30 day monitor did not show any A. fib or atrial arrhythmias except for 3-5 beat of wide-complex tachycardia which was not symptomatic he gets winded and short of breath on minimal exertion. Wife reports that he does not sleep well at night he is restless tired all the time     Stress test unfortunately shows anterior wall large area of ischemia but his cardiac cath revealed patent LAD stent he does have ostial 20 to 30% stenosis. Circumflex and RCA were patent his pulmonary artery pressure was a mean of 15 mmhg and a systolic pressure of 35 wedge  was 11      He status post lung resection. He continues struggle with significant shortness of breath and episodes of tachycardia. His pulmonologist raised a valid question whether he is going into A. fib ? But his 30-day event monitor did not show any arrhythmias as mentioned above   HE is on O2 ATC . When he was in cardiac rehab he would have frequent low blood pressures in the 80s although is not going to cardiac rehab anymore . He had LAD stent due to abnormal FFR in April 2019 .he is struggling with depression as well. . He had lung surgery due to lung mass and Jan 2019. He says that he has not felt good sine then. Review of Systems   Constitutional: Positive for fatigue. Negative for fever. Respiratory: Negative for cough and shortness of breath. Cardiovascular: Negative for chest pain, palpitations and leg swelling. Gastrointestinal:        Hiccups   Musculoskeletal: Positive for gait problem. Negative for arthralgias. Neurological: Positive for weakness. Negative for dizziness, syncope, light-headedness and headaches. Psychiatric/Behavioral: Positive for dysphoric mood. Vitals:    07/14/21 1403   BP: (!) 116/54   Pulse: 65   Weight: 190 lb 12.8 oz (86.5 kg)   Height: 6' (1.829 m)       Wt Readings from Last 3 Encounters:   07/14/21 190 lb 12.8 oz (86.5 kg)   06/01/21 189 lb (85.7 kg)   05/17/21 189 lb (85.7 kg)       BP Readings from Last 3 Encounters:   07/14/21 (!) 116/54   06/01/21 100/62   05/17/21 139/86       Prior to Admission medications    Medication Sig Start Date End Date Taking?  Authorizing Provider screen  Never done    DTaP/Tdap/Td vaccine (1 - Tdap) Never done    Shingles Vaccine (1 of 2) Never done   [employer] Visit (AWV)  Never done    Lipid screen  11/02/2019    TSH testing  03/13/2020    Flu vaccine (1) 09/01/2021    Pneumococcal 65+ years Vaccine  Completed    COVID-19 Vaccine  Completed    Hepatitis A vaccine  Aged Out    Hib vaccine  Aged Out    Meningococcal (ACWY) vaccine  Aged Out       Lab Review   Lab Results   Component Value Date    CHOL 117 11/02/2018    TRIG 178 11/02/2018    HDL 43 11/02/2018      echo 11*/20/18   Summary   Normal left ventricle structure and systolic function with an ejection   fraction of 55-60%.  Grade I diastolic dysfunction.   No significant valvular disease noted.   No evidence of pericardial effusion.   Essentially unremarkable echo.        Stress test 11/20/18      [facility] physician Dr. Daniel Martinez .   [place] portion of stress test is negative for ischemia by diagnostic criteria.    Normal EF 61 % with normal ventricular contractility.    No infarct or ischemia noted.    Normal stress myocardial perfusion.    Normal study      holter 12/3/18  9 beat run of SVT , no other arrhythmias noted , No diary provided, primarily sinus rythym  Clinical correlation needed     Cath 4/23/19  Conclusions      Procedure Summary   Access : Radial   Indication: unstable angina Class IV   1. FFR of proximal LAD was 0.78   2. PCI to Proximal LAD using 3.25 X 38 KUMAR inflated to 18 atms ,   distal LAD towards apex has 90 % lesion   3. mid circ has 20- 30 % lesion   4. RCA is patent but small       Angiographic Findings    Diagnostic Findings      Cardiac Arteries and Lesion Findings     LMCA: Normal, Normal (no stenosis %) and No Angiographicalyl Significant  Disease. heavily calcified    LAD: Abnormal.proximal segment has long 70 to 80 % lesion, first diag is  diffusely diseased has 80% lesion , distal lad at apex has 90 % lesion ( small  vessel after apex       Lesion on Prox LAD: 80% stenosis. Culprit lesion.       Devices used       - [place] Pressure Wire. Number of passes: 1.       - 3.25 x 38 RX 10 [facility] KUMAR. 2 inflation(s) to a max pressure of:    20 kian.     LCx: Normal, Normal (no stenosis %) and No Angiographicalyl Significant  Disease. mid circ at OM take off has 20 to 30 % stenosis, OM 1 is widely patent    RCA: Normal (no stenosis %), No Angiographicalyl Significant Disease and Mild  Lumen Irregularity. diffusely calcified small calibre vessel but widely patent     Stress test  Summary     Supervising physician Dr. Gena Pacheco portion of stress test is negative for ischemia by diagnostic criteria.     Possible afib or atrial flutter post infusion , resolved within a minute     Severe anterior medium to large area of ischemia     Abnormal stress test      Cath 11/6/2020    Procedure Summary   Access : Radial and femoral Vein   1. LAD stent is patent , ostial LAD has 20-30 % stenosis,   2. Circ and RCA are patent   3. LVEDP was 16 mmHG   4. PA was 35/7 mean 15 mmHG ,Pcwp was 11 mmHG   5. CO is 3.9 L/MIN   6. RV was 40/3 mmHG and RA was 7 mmHG  Angiographic Findings      Diagnostic Findings    Cardiac Arteries and Lesion Findings   LMCA: Normal (no stenosis %) and No Angiographicalyl Significant  Disease. widely patent   LAD: Diffuse irregularity. LAD stents is patent , ostial LAD has 30-40 %  disease followed by aneurismal segment and lad stent which is patent, it is  similar to cath in 2019 . 2 diag are small and diffusely diseased There is a  previous stent on Prox LAD.   LCx: No Angiographicalyl Significant Disease, Mild Lumen Irregularity, Diffuse  irregularity, Abnormal and Chronic occlusion. Circ and Om are patent   RCA: Normal (no stenosis %), No Angiographicalyl Significant Disease and Mild  Lumen Irregularity. No significant disease , PDA is small        ASSESSMENT/PLAN:    ASCVD / chest pain   Chest pain improved after PCI to LAD in April 2019, Cath in  November 2020 showed patent LAD stent at the images were reviewed. Circumflex and RCA are patent right-sided pressures are normal pulmonary capillary wedge pressure normal.  Stable  Continue cardizem, lipitor, ASA     Orthostatic hypotension   Multifactorial autonomic dysfunction, associated with volume status and possible neurological etiology for orthostasis ?he is not on diuretics. [place]  is an option but cost is an issue  His wife had called in and we increase midorine 10 days ago  Stop midodrine 10 mg 3 times daily compression stockings 15 to 20 mmHg  Add florinef 0.1 mg BID  palpitations/tachycardia  He developed atrial rhythm versus artifact? Possible multifocal atrial tachycardia after Lexiscan infusion. I have started him on Cardizem he cannot tolerate higher doses due to orthostasis. 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 Sees pulmonology followed very closely there is concerns for him developing interstitial lung disease. On echo and right heart cath pressures are normal         Dyslipidemia :  All available lab work was reviewed. Patient was advised to repeat lab work before next visit    Hiccups  Start reglan 10 mg po QID until hiccups resolve. Encouraged to talk to PCP    Return in about 1 month (around 8/14/2021) for with Dr. Edwin Benedict, or vishal if needed. An electronic signature was used to authenticate this note.     Electronically signed by ASHLEE King CNP on 7/14/2021 at 2:35 PM Alzheimer's dementia without behavioral disturbance, unspecified timing of dementia onset

## 2021-07-14 NOTE — LETTER
Roberto Renteria  1940  [id number]    Have you had any Chest Pain that is not new? - No  If Yes DO EKG - How does it feel -    How long does the pain last -      How long have you been having the pain -    Did you take a    And did it relieve the pain -      DO EKG IF: Patient has a Heart Rate above 100 or below 40     CAD (Coronary Artery Disease) patient should have one on file every 6 months        Have you had any Shortness of Breath - No  If Yes - When     Have you had any dizziness - No  If Yes DO ORTHOSTATIC BP - when do you feel dizzy    How long does it last .        Sitting wait 5 minutes do supine (laying down) wait 5 minutes then do standing - log each in \"vitals\" area in Epic   Be sure to ask what symptoms they are having if they get dizzy while completing ortho stats such as: room spinning, nausea, etc.    Have you had any palpitations that are not new? - No  If Yes DO EKG - Do you feel your heart   How long does it last - . Is the patient on any of the following medications -   If Yes DO EKG - Needs done every 6 months    Do you have any edema - swelling in No    If Yes - CHECK TO SEE IF THE EDEMA IS PITTING  How long have they been having edema -   If Yes - Have they worn compression stockings   If they have worn compression stockings      Vein \"LEG PROBLEM Questionnaire\"  1. Do you have prominent leg veins? No   2. Do you have any skin discoloration? No  3. Do you have any healed or active sores? No  4. Do you have swelling of the legs? No  5. Do you have a family history of varicose veins? No  6. Does your profession involve pro-longed        standing or heavy lifting? No  7. Have you been fighting overweight problems? No  8. Do you have restless legs? Yes  9. Do you have any night time cramps? No  10.  Do you have any of the following in your legs:        No     When did you have your last labs drawn 2021  Where did you have them done   What doctor ordered     If we do not have these labs you are retrieve these labs for these providers! Do you have a surgery or procedure scheduled in the near future - No  If Yes- DO EKG  If Yes - Who is the surgery with?    Phone number of physician   Fax number of physician   Type of surgery   GIVE THIS INFORMATION TO HANNAH OSCAR     Ask patient if they want to sign up for MyChart if they are not already signed up     Check to see if we have an E-MAIL on file for the patient     Check medication list thoroughly!!! AND RECONCILE OUTSIDE MEDICATIONS  If dose has changed change the entire order not just the [place] At check out add to every patient's \"wrap up\" the following dot phrase AFTERHOURSEDUCATION and ensure we explain this to our patients

## 2021-07-16 ENCOUNTER — TELEPHONE (OUTPATIENT)
Dept: CARDIOLOGY CLINIC | Age: 81
End: 2021-07-16

## 2021-07-16 NOTE — TELEPHONE ENCOUNTER
Took the reglan for 2 days and developed headache, strange leg movements and total exhaustion, thought she was not going to get him into the bed last night. Have not given it to him today. hic ups are gone.   Please call pt

## 2021-08-18 ENCOUNTER — OFFICE VISIT (OUTPATIENT)
Dept: CARDIOLOGY CLINIC | Age: 81
End: 2021-08-18
Payer: MEDICARE

## 2021-08-18 VITALS
DIASTOLIC BLOOD PRESSURE: 88 MMHG | HEART RATE: 68 BPM | HEIGHT: 72 IN | BODY MASS INDEX: 25.73 KG/M2 | SYSTOLIC BLOOD PRESSURE: 130 MMHG | WEIGHT: 190 LBS

## 2021-08-18 DIAGNOSIS — E11.69 HYPERLIPIDEMIA ASSOCIATED WITH TYPE 2 DIABETES MELLITUS (HCC): ICD-10-CM

## 2021-08-18 DIAGNOSIS — J84.9 INTERSTITIAL LUNG DISEASE (HCC): ICD-10-CM

## 2021-08-18 DIAGNOSIS — I25.110 CORONARY ARTERY DISEASE INVOLVING NATIVE CORONARY ARTERY OF NATIVE HEART WITH UNSTABLE ANGINA PECTORIS (HCC): ICD-10-CM

## 2021-08-18 DIAGNOSIS — I25.10 ASCVD (ARTERIOSCLEROTIC CARDIOVASCULAR DISEASE): ICD-10-CM

## 2021-08-18 DIAGNOSIS — R07.89 OTHER CHEST PAIN: Primary | ICD-10-CM

## 2021-08-18 DIAGNOSIS — J90 PLEURAL EFFUSION: ICD-10-CM

## 2021-08-18 DIAGNOSIS — E11.9 TYPE 2 DIABETES MELLITUS WITHOUT COMPLICATION, WITHOUT LONG-TERM CURRENT USE OF INSULIN (HCC): ICD-10-CM

## 2021-08-18 DIAGNOSIS — E78.5 HYPERLIPIDEMIA ASSOCIATED WITH TYPE 2 DIABETES MELLITUS (HCC): ICD-10-CM

## 2021-08-18 DIAGNOSIS — R06.02 SOB (SHORTNESS OF BREATH) ON EXERTION: ICD-10-CM

## 2021-08-18 DIAGNOSIS — I95.1 ORTHOSTATIC HYPOTENSION: ICD-10-CM

## 2021-08-18 DIAGNOSIS — R06.02 SOB (SHORTNESS OF BREATH): ICD-10-CM

## 2021-08-18 PROCEDURE — 3052F HG A1C>EQUAL 8.0%<EQUAL 9.0%: CPT | Performed by: INTERNAL MEDICINE

## 2021-08-18 PROCEDURE — 99214 OFFICE O/P EST MOD 30 MIN: CPT | Performed by: INTERNAL MEDICINE

## 2021-08-18 RX ORDER — FLUDROCORTISONE ACETATE 0.1 MG/1
0.1 TABLET ORAL DAILY
Qty: 90 TABLET | Refills: 3 | Status: SHIPPED | OUTPATIENT
Start: 2021-08-18 | End: 2021-09-17

## 2021-08-18 RX ORDER — RANOLAZINE 500 MG/1
500 TABLET, EXTENDED RELEASE ORAL 2 TIMES DAILY
COMMUNITY
End: 2022-02-25

## 2021-08-18 NOTE — PROGRESS NOTES
CARDIOLOGY  NOTE    Chief Complaint: Chest pain and shortness of breath    HPI:   Celine Zuniga is a 79y.o. year old who has history as noted below. Mr. Linda Escudero is here with his wife, his blood pressure is finaly better and dizziness improved   He is  on oxygen around-the-clock  oxygen saw pulmonology and had a CT scan done it appears that he has progressive lung disease and interstitial lung fibrosis with emphysema and pleural effusion. Bp is very erratic and he is very orthostatic with occasional drop in bp to 90/40's HE is being evaluated by palliative care Getting physical therapy at home. HE got covid vaccine  However he seems to be in better spirits   In office today his blood pressure drops by 10-15 points on standing    [place] had cardiac cath in November 2020 showing patent stents and mild to moderate disease. Post Lexiscan infusion he had runs of atrial tachycardia however 30 day monitor did not show any A. fib or atrial arrhythmias except for 3-5 beat of wide-complex tachycardia . He status post lung resection. He continues struggle with significant shortness of breath and episodes of tachycardia. He had LAD stent due to abnormal FFR in April 2019 .he is struggling with depression as well. . He had lung surgery due to lung mass and Jan 2019.   He says that he has not felt good since then    Current Outpatient Medications   Medication Sig Dispense Refill    bisacodyl (DULCOLAX) 5 MG EC tablet Take 5 mg by mouth daily as needed for Constipation      metFORMIN (GLUCOPHAGE) 500 MG tablet Take 500 mg by mouth 2 times daily (with meals)      ranolazine (RANEXA) 500 MG extended release tablet Take 500 mg by mouth 2 times daily      fludrocortisone (FLORINEF) 0.1 MG tablet Take 1 tablet by mouth daily 90 tablet 3    dilTIAZem (CARDIZEM CD) 120 MG extended release capsule TAKE 1 CAPSULE BY MOUTH EVERY DAY 90 capsule 1    Compression Stockings MISC by Does not apply route 15 to 20 mmhg 1 each 0    DULoxetine (CYMBALTA) 30 MG extended release capsule Take 30 mg by mouth daily      aspirin 81 MG EC tablet Take 1 tablet by mouth every morning Over The Counter 90 tablet 1    donepezil (ARICEPT) 10 MG tablet Take 1 tablet by mouth nightly 90 tablet 1    atorvastatin (LIPITOR) 20 MG tablet Take 1 tablet by mouth daily 90 tablet 1    SITagliptin (JANUVIA) 100 MG tablet Take 1 tablet by mouth daily 90 tablet 1    levothyroxine (SYNTHROID) 50 MCG tablet Take 1 tablet by mouth Daily 90 tablet 1    Cholecalciferol (VITAMIN D3) 5000 units TABS Take by mouth every morning Over The Counter       No current facility-administered medications for this visit. Allergies:   Norco [hydrocodone-acetaminophen]    Patient History:  Past Medical History:   Diagnosis Date    Arthritis     \"Back, Knees\"    Asthma     Back pain     \"At Times\"    CAD (coronary artery disease)     cardiovascular stress test 10/15/2020    possible afib or atrial flutter post infusion severe anterior medium to large ischemia     Diabetes mellitus ([address]     dx 5+ yrs ago-     echocardiogram 11/03/2020    EF 50-55% mild tricuspid regurg no significant valvular disease noted.  Glaucoma     Bilateral Eyes    H/O 24 hour EKG monitoring 11-    Predominantly SR - avg rate of 69bpm. Lowest rate 46 bpm at 0622. Frequent isolated 5954 PVCs noted mostly in bigemenal pattern w/out complex arrhythmias. Five beat run of atrial tachycardia at 1228. No palpitations or dizziness reported in patient's daily activity report. (12-, )    H/O cardiac catheterization 06-    Noncritical diffuse CAD w/no critical stenosis. Diag borderline significant stenosis, not large enough fo percutaneous intervention. No significant angiographic progression of atherosclerosis since cath in 1997.   ( per Dr. Meli Baig at [facility]. Preserved vent function. MVP.  CAD w/normal LM, 30-40% stenosis LAD, 70-80% stenosis of ostium & prox seg diag branch, normal left CX & RCA.)    H/O cardiovascular stress test 1/30/2013, 06- 1/30/2013-Normal study. Normal perfusion in all regions. EF 62%. 06--EF=60%. Normal. Normal pattern of perfusion. LV normal size. No wall abnormality. Exercise capacity good. (05-, 12-, 06-, , )    H/O chest pain     H/O chest x-ray 06-    Negative. Dx was dyspnea and CAD.  (02-)    H/O dizziness     H/O Doppler ultrasound 11-    (Carotid) Intimal thickening but no significant atherosclerotic plaque noted in right or left ICA. Doppler flow velocities w/in right and left ICA WNL.  H/O echocardiogram 1/30/2013, 05-    1/30/2013- LVSF normal. EF 50-55%. Impaired LV relaxation. Trace MR. Trace TR;   5- Normal LV size and systolic function. EF=60%. Chamber dimensions WNL. Mild concentric LV hypertrophy. Valves appear structurally normal.-OICC       H/O gastroesophageal reflux (GERD)     H/O pneumothorax Dx 1960    \"Both Sides\"    History of cardiac monitoring 12/03/2018    9 beat run of SVT, no other arrhythmias noted, primarily sinus rythym    History of complete ECG 06/06/2011    (06-, 07-, 06-, 06-)    History of exercise stress test 05/21/2019    Treadmill, Stopped due to fatigue and  Dyspnea.  History of stress test 11/07/2018    EF 61%, Normal    [place] IRA (hard of hearing)     Bilateral Hearing Aids    Hx of CT scan of chest 11/12/2018    3 mm indeterminate solid nonobstructing endobronchial nodule of the proximal left upper lobe bronchus. Further evaluation with endoscopy is recommended. Bilateral basilar predominant intersitial changes suggestive of nonspecific interstitial pneumonitis.     Hx of Doppler echocardiogram 11/20/2018    EF55-60%,no valvular disease    Hyperlipidemia     Hypertension     Left Lung Cancer Dx 11-18    LEFT UPPER LOBECTOMY 1/28/19    heartburn  · Genitourinary: No dysuria, trouble voiding, or hematuria  · Musculoskeletal:  None  · Integumentary: No rash or pruritis  · Neurological: No TIA or stroke symptoms  · Psychiatric: insomnia  · Endocrine: No malaise, fatigue or temperature intolerance  · Hematologic/Lymphatic: No bleeding problems, blood clots or swollen lymph nodes  · Allergic/Immunologic: No nasal congestion or hives    Objective:      Physical Exam:  /88   Pulse 68   Ht 6' (1.829 m)   Wt 190 lb (86.2 kg)   BMI 25.77 kg/m²   Wt Readings from Last 3 Encounters:   08/18/21 190 lb (86.2 kg)   07/14/21 190 lb 12.8 oz (86.5 kg)   06/01/21 189 lb (85.7 kg)     Body mass index is 25.77 kg/m². Vitals:    08/18/21 1459   BP: 130/88   Pulse: 68        General Appearance:  No distress, conversant needs assistance getting out of chair is frail and weak in the proximal thigh muscles  Constitutional:  Well developed, Well nourished, No acute distress, Non-toxic appearance. HENT:  Normocephalic, Atraumatic, Bilateral external ears normal, Oropharynx moist, No oral exudates, Nose normal. Neck- Normal range of motion, No tenderness, Supple, No stridor,no apical-carotid delay  Eyes:  PERRL, EOMI, Conjunctiva normal, No discharge. Respiratory:  Normal breath sounds, No respiratory distress, No wheezing, No chest tenderness. ,no use of accessory muscles, NO crackles  Cardiovascular: (PMI) apex non displaced,no lifts no thrills,S1 and S2 audible, No added heart sounds, No signs of ankle edema, or volume overload, No evidence of JVD, No crackles  GI:  Bowel sounds normal, Soft, No tenderness, No masses, No gross visceromegaly   :  No costovertebral angle tenderness   Musculoskeletal:  No edema, no tenderness, no deformities.  Back- no tenderness  Integument:  Well hydrated, no rash   Lymphatic:  No lymphadenopathy noted   Neurologic:  Alert & oriented x 3, CN 2-12 normal, weak proximal thigh muscle, normal sensory function, no focal deficits Summary   Access : Radial   Indication: unstable angina Class IV   1. FFR of proximal LAD was 0.78   2. PCI to Proximal LAD using 3.25 X 38 KUMAR inflated to 18 atms ,   distal LAD towards apex has 90 % lesion   3. mid circ has 20- 30 % lesion   4. RCA is patent but small       Angiographic Findings    Diagnostic Findings      Cardiac Arteries and Lesion Findings     LMCA: Normal, Normal (no stenosis %) and No Angiographicalyl Significant  Disease. heavily calcified    LAD: Abnormal.proximal segment has long 70 to 80 % lesion, first diag is  diffusely diseased has 80% lesion , distal lad at apex has 90 % lesion ( small  vessel after apex       Lesion on Prox LAD: 80% stenosis. Culprit lesion.       Devices used       - [employer] Pressure Wire. Number of passes: 1.       - 3.25 x 38 RX 10 [facility] KUMAR. 2 inflation(s) to a max pressure of:    20 kian.     LCx: Normal, Normal (no stenosis %) and No Angiographicalyl Significant  Disease. mid circ at OM take off has 20 to 30 % stenosis, OM 1 is widely patent    RCA: Normal (no stenosis %), No Angiographicalyl Significant Disease and Mild  Lumen Irregularity. diffusely calcified small calibre vessel but widely patent    Stress test  Summary     Supervising physician Dr. Nuris Maldonado portion of stress test is negative for ischemia by diagnostic criteria.     Possible afib or atrial flutter post infusion , resolved within a minute     Severe anterior medium to large area of ischemia     Abnormal stress test     Cath 11/6/2020    Procedure Summary   Access : Radial and femoral Vein   1. LAD stent is patent , ostial LAD has 20-30 % stenosis,   2. Circ and RCA are patent   3. LVEDP was 16 mmHG   4. PA was 35/7 mean 15 mmHG ,Pcwp was 11 mmHG   5. CO is 3.9 L/MIN   6. RV was 40/3 mmHG and RA was 7 mmHG  Angiographic Findings      Diagnostic Findings    Cardiac Arteries and Lesion Findings   LMCA: Normal (no stenosis %) and No Angiographicalyl Significant  Disease. widely patent   LAD: Diffuse irregularity. LAD stents is patent , ostial LAD has 30-40 %  disease followed by aneurismal segment and lad stent which is patent, it is  similar to cath in 2019 . 2 diag are small and diffusely diseased There is a  previous stent on Prox LAD.   LCx: No Angiographicalyl Significant Disease, Mild Lumen Irregularity, Diffuse  irregularity, Abnormal and Chronic occlusion. Circ and Om are patent   RCA: Normal (no stenosis %), No Angiographicalyl Significant Disease and Mild  Lumen Irregularity. No significant disease , PDA is small          All labs, medications and tests reviewed by myself including data and history from outside source , patient and available family . Assessment & Plan:      1. Other chest pain    2. Type 2 diabetes mellitus without complication, without long-term current use of insulin ([address]    3. ASCVD (arteriosclerotic cardiovascular disease)    4. SOB (shortness of breath) on exertion    5. Interstitial lung disease ([address]    6. Pleural effusion    7. Coronary artery disease involving native coronary artery of native heart with unstable angina pectoris ([address]    8. SOB (shortness of breath)    9. Orthostatic hypotension    10. Hyperlipidemia associated with type 2 diabetes mellitus ([address]       ASCVD / chest pain   Chest pain improved after PCI to LAD in April 2019 , Cath in  November 2020 showed patent LAD stent at the images were reviewed. Circumflex and RCA are patent right-sided pressures are normal pulmonary capillary wedge pressure normal.  I think his quality of life is  Getting affected a lot by his ongoing depression and orthostatic episodes     Orthostatic hypotension   Multifactorial autonomic dysfunction, associated with volume status and possible neurological etiology for orthostasis ?he is not on diuretics.   Lanelle Riddles  is an option but cost is an issue  Continue  Florinef 0.1mg bid  compression stockings 15 to 20 mmHg    palpitations/tachycardia  He developed atrial rhythm versus artifact? Possible multifocal atrial tachycardia after Lexiscan infusion. I have started him on Cardizem he cannot tolerate higher doses due to orthostasis. For now continue Cardizem       Chronic respiratory failure  He has been oxygen dependent ever since his end of bronchial mass resection he is on 2 L of oxygen around-the-clock. Sees pulmonology followed very closely there is concerns for him developing interstitial lung disease  On echo and right heart cath pressures are normal       Dyslipidemia :  All available lab work was reviewed. Patient was advised to repeat lab work before next visit      Counseled extensively and medication compliance urged. We discussed that for the  prevention of ASCVD our  goal is aggressive risk modification. Patient is encouraged to exercise even a brisk walk for 30 minutes  at least 3 to 4 times a week   Various goals were discussed and questions answered. Continue current medications. Appropriate prescriptions are addressed and refills ordered. Questions answered and patient verbalizes understanding. Call for any problems, questions, or concerns. Continue all other medications of all above medical condition listed as is. Return in about 6 months (around 2/18/2022). Please note this report has been partially produced using speech recognition software and may contain errors related to that system including errors in grammar, punctuation, and spelling, as well as words and phrases that may be inappropriate.  If there are any questions or concerns please feel free to contact the dictating provider for clarification.

## 2021-11-29 RX ORDER — DILTIAZEM HYDROCHLORIDE 120 MG/1
CAPSULE, COATED, EXTENDED RELEASE ORAL
Qty: 90 CAPSULE | Refills: 1 | Status: SHIPPED | OUTPATIENT
Start: 2021-11-29

## 2022-02-24 ENCOUNTER — HOSPITAL ENCOUNTER (OUTPATIENT)
Age: 82
Setting detail: OBSERVATION
Discharge: HOME | End: 2022-02-26
Attending: INTERNAL MEDICINE | Admitting: INTERNAL MEDICINE
Payer: MEDICARE

## 2022-02-24 ENCOUNTER — APPOINTMENT (OUTPATIENT)
Dept: GENERAL RADIOLOGY | Age: 82
End: 2022-02-24
Payer: MEDICARE

## 2022-02-24 DIAGNOSIS — R07.89 OTHER CHEST PAIN: Primary | ICD-10-CM

## 2022-02-24 DIAGNOSIS — G89.4 CHRONIC PAIN SYNDROME: ICD-10-CM

## 2022-02-24 DIAGNOSIS — R74.8 ELEVATED LIPASE: ICD-10-CM

## 2022-02-24 PROCEDURE — 93005 ELECTROCARDIOGRAM TRACING: CPT | Performed by: PHYSICIAN ASSISTANT

## 2022-02-24 PROCEDURE — 99285 EMERGENCY DEPT VISIT HI MDM: CPT

## 2022-02-24 PROCEDURE — 71045 X-RAY EXAM CHEST 1 VIEW: CPT

## 2022-02-24 PROCEDURE — 82805 BLOOD GASES W/O2 SATURATION: CPT

## 2022-02-24 PROCEDURE — 0202U NFCT DS 22 TRGT SARS-COV-2: CPT

## 2022-02-24 RX ORDER — ASPIRIN 81 MG/1
324 TABLET, CHEWABLE ORAL ONCE
Status: COMPLETED | OUTPATIENT
Start: 2022-02-24 | End: 2022-02-25

## 2022-02-25 ENCOUNTER — APPOINTMENT (OUTPATIENT)
Dept: NUCLEAR MEDICINE | Age: 82
End: 2022-02-25
Payer: MEDICARE

## 2022-02-25 ENCOUNTER — APPOINTMENT (OUTPATIENT)
Dept: CT IMAGING | Age: 82
End: 2022-02-25
Payer: MEDICARE

## 2022-02-25 LAB
ADENOVIRUS DETECTION BY PCR: NOT DETECTED
ALBUMIN SERPL-MCNC: 3.4 GM/DL (ref 3.4–5)
ALBUMIN SERPL-MCNC: 3.7 GM/DL (ref 3.4–5)
ALP BLD-CCNC: 73 IU/L (ref 40–129)
ALP BLD-CCNC: 75 IU/L (ref 40–128)
ALT SERPL-CCNC: 18 U/L (ref 10–40)
ALT SERPL-CCNC: 19 U/L (ref 10–40)
ANION GAP SERPL CALCULATED.3IONS-SCNC: 12 MMOL/L (ref 4–16)
ANION GAP SERPL CALCULATED.3IONS-SCNC: 14 MMOL/L (ref 4–16)
AST SERPL-CCNC: 27 IU/L (ref 15–37)
AST SERPL-CCNC: 28 IU/L (ref 15–37)
BACTERIA: NEGATIVE /HPF
BASE EXCESS MIXED: 3.9 (ref 0–1.2)
BASOPHILS ABSOLUTE: 0.1 K/CU MM
BASOPHILS ABSOLUTE: 0.1 K/CU MM
BASOPHILS RELATIVE PERCENT: 1.4 % (ref 0–1)
BASOPHILS RELATIVE PERCENT: 1.6 % (ref 0–1)
BILIRUB SERPL-MCNC: 0.4 MG/DL (ref 0–1)
BILIRUB SERPL-MCNC: 0.5 MG/DL (ref 0–1)
BILIRUBIN URINE: NEGATIVE MG/DL
BLOOD, URINE: NEGATIVE
BORDETELLA PARAPERTUSSIS BY PCR: NOT DETECTED
BORDETELLA PERTUSSIS PCR: NOT DETECTED
BUN BLDV-MCNC: 15 MG/DL (ref 6–23)
BUN BLDV-MCNC: 20 MG/DL (ref 6–23)
CALCIUM SERPL-MCNC: 8.8 MG/DL (ref 8.3–10.6)
CALCIUM SERPL-MCNC: 9.5 MG/DL (ref 8.3–10.6)
CHLAMYDOPHILA PNEUMONIA PCR: NOT DETECTED
CHLORIDE BLD-SCNC: 101 MMOL/L (ref 99–110)
CHLORIDE BLD-SCNC: 103 MMOL/L (ref 99–110)
CLARITY: CLEAR
CO2: 24 MMOL/L (ref 21–32)
CO2: 25 MMOL/L (ref 21–32)
COLOR: YELLOW
CORONAVIRUS 229E PCR: NOT DETECTED
CORONAVIRUS HKU1 PCR: NOT DETECTED
CORONAVIRUS NL63 PCR: NOT DETECTED
CORONAVIRUS OC43 PCR: NOT DETECTED
CREAT SERPL-MCNC: 0.9 MG/DL (ref 0.9–1.3)
CREAT SERPL-MCNC: 1 MG/DL (ref 0.9–1.3)
DIFFERENTIAL TYPE: ABNORMAL
DIFFERENTIAL TYPE: ABNORMAL
EKG ATRIAL RATE: 68 BPM
EKG DIAGNOSIS: NORMAL
EKG P AXIS: -28 DEGREES
EKG P-R INTERVAL: 88 MS
EKG Q-T INTERVAL: 412 MS
EKG QRS DURATION: 94 MS
EKG QTC CALCULATION (BAZETT): 438 MS
EKG R AXIS: -32 DEGREES
EKG T AXIS: 73 DEGREES
EKG VENTRICULAR RATE: 68 BPM
EOSINOPHILS ABSOLUTE: 0.5 K/CU MM
EOSINOPHILS ABSOLUTE: 0.6 K/CU MM
EOSINOPHILS RELATIVE PERCENT: 5.5 % (ref 0–3)
EOSINOPHILS RELATIVE PERCENT: 6.2 % (ref 0–3)
FERRITIN: 209 NG/ML (ref 30–400)
FOLATE: 9.5 NG/ML (ref 3.1–17.5)
GFR AFRICAN AMERICAN: >60 ML/MIN/1.73M2
GFR AFRICAN AMERICAN: >60 ML/MIN/1.73M2
GFR NON-AFRICAN AMERICAN: >60 ML/MIN/1.73M2
GFR NON-AFRICAN AMERICAN: >60 ML/MIN/1.73M2
GLUCOSE BLD-MCNC: 113 MG/DL (ref 70–99)
GLUCOSE BLD-MCNC: 136 MG/DL (ref 70–99)
GLUCOSE BLD-MCNC: 151 MG/DL (ref 70–99)
GLUCOSE, URINE: NEGATIVE MG/DL
HCO3 VENOUS: 30.2 MMOL/L (ref 19–25)
HCT VFR BLD CALC: 36.5 % (ref 42–52)
HCT VFR BLD CALC: 37.9 % (ref 42–52)
HEMOGLOBIN: 11.4 GM/DL (ref 13.5–18)
HEMOGLOBIN: 11.7 GM/DL (ref 13.5–18)
HUMAN METAPNEUMOVIRUS PCR: NOT DETECTED
IMMATURE NEUTROPHIL %: 0.1 % (ref 0–0.43)
IMMATURE NEUTROPHIL %: 0.2 % (ref 0–0.43)
INFLUENZA A BY PCR: NOT DETECTED
INFLUENZA A H1 (2009) PCR: NOT DETECTED
INFLUENZA A H1 PANDEMIC PCR: NOT DETECTED
INFLUENZA A H3 PCR: NOT DETECTED
INFLUENZA B BY PCR: NOT DETECTED
IRON: 69 UG/DL (ref 59–158)
KETONES, URINE: ABNORMAL MG/DL
LEUKOCYTE ESTERASE, URINE: NEGATIVE
LIPASE: 403 IU/L (ref 13–60)
LIPASE: 43 IU/L (ref 13–60)
LV EF: 50 %
LV EF: 50 %
LVEF MODALITY: NORMAL
LVEF MODALITY: NORMAL
LYMPHOCYTES ABSOLUTE: 2.5 K/CU MM
LYMPHOCYTES ABSOLUTE: 2.8 K/CU MM
LYMPHOCYTES RELATIVE PERCENT: 28.2 % (ref 24–44)
LYMPHOCYTES RELATIVE PERCENT: 29.2 % (ref 24–44)
MCH RBC QN AUTO: 29.5 PG (ref 27–31)
MCH RBC QN AUTO: 29.8 PG (ref 27–31)
MCHC RBC AUTO-ENTMCNC: 30.9 % (ref 32–36)
MCHC RBC AUTO-ENTMCNC: 31.2 % (ref 32–36)
MCV RBC AUTO: 95.3 FL (ref 78–100)
MCV RBC AUTO: 95.7 FL (ref 78–100)
MONOCYTES ABSOLUTE: 0.6 K/CU MM
MONOCYTES ABSOLUTE: 0.7 K/CU MM
MONOCYTES RELATIVE PERCENT: 6.3 % (ref 0–4)
MONOCYTES RELATIVE PERCENT: 6.9 % (ref 0–4)
MUCUS: ABNORMAL HPF
MYCOPLASMA PNEUMONIAE PCR: NOT DETECTED
NITRITE URINE, QUANTITATIVE: NEGATIVE
NUCLEATED RBC %: 0 %
NUCLEATED RBC %: 0 %
O2 SAT, VEN: 91.4 % (ref 50–70)
PARAINFLUENZA 1 PCR: NOT DETECTED
PARAINFLUENZA 2 PCR: NOT DETECTED
PARAINFLUENZA 3 PCR: NOT DETECTED
PARAINFLUENZA 4 PCR: NOT DETECTED
PCO2, VEN: 51 MMHG (ref 38–52)
PCT TRANSFERRIN: 27 % (ref 10–44)
PDW BLD-RTO: 13.2 % (ref 11.7–14.9)
PDW BLD-RTO: 13.3 % (ref 11.7–14.9)
PH VENOUS: 7.38 (ref 7.32–7.42)
PH, URINE: 5.5 (ref 5–8)
PLATELET # BLD: 230 K/CU MM (ref 140–440)
PLATELET # BLD: 235 K/CU MM (ref 140–440)
PMV BLD AUTO: 10.6 FL (ref 7.5–11.1)
PMV BLD AUTO: 10.7 FL (ref 7.5–11.1)
PO2, VEN: 68 MMHG (ref 28–48)
POTASSIUM SERPL-SCNC: 4 MMOL/L (ref 3.5–5.1)
POTASSIUM SERPL-SCNC: 4 MMOL/L (ref 3.5–5.1)
PRO-BNP: 255.8 PG/ML
PROTEIN UA: ABNORMAL MG/DL
RBC # BLD: 3.83 M/CU MM (ref 4.6–6.2)
RBC # BLD: 3.96 M/CU MM (ref 4.6–6.2)
RBC URINE: 1 /HPF (ref 0–3)
RETICULOCYTE COUNT PCT: 1.9 % (ref 0.2–2.2)
RHINOVIRUS ENTEROVIRUS PCR: NOT DETECTED
RSV PCR: NOT DETECTED
SARS-COV-2: NOT DETECTED
SEGMENTED NEUTROPHILS ABSOLUTE COUNT: 5.1 K/CU MM
SEGMENTED NEUTROPHILS ABSOLUTE COUNT: 5.3 K/CU MM
SEGMENTED NEUTROPHILS RELATIVE PERCENT: 56.1 % (ref 36–66)
SEGMENTED NEUTROPHILS RELATIVE PERCENT: 58.3 % (ref 36–66)
SODIUM BLD-SCNC: 138 MMOL/L (ref 135–145)
SODIUM BLD-SCNC: 141 MMOL/L (ref 135–145)
SPECIFIC GRAVITY UA: 1.02 (ref 1–1.03)
TOTAL IMMATURE NEUTOROPHIL: 0.01 K/CU MM
TOTAL IMMATURE NEUTOROPHIL: 0.02 K/CU MM
TOTAL IRON BINDING CAPACITY: 254 UG/DL (ref 250–450)
TOTAL NUCLEATED RBC: 0 K/CU MM
TOTAL NUCLEATED RBC: 0 K/CU MM
TOTAL PROTEIN: 6.2 GM/DL (ref 6.4–8.2)
TOTAL PROTEIN: 7.2 GM/DL (ref 6.4–8.2)
TROPONIN T: <0.01 NG/ML
TSH HIGH SENSITIVITY: 1.93 UIU/ML (ref 0.27–4.2)
UNSATURATED IRON BINDING CAPACITY: 185 UG/DL (ref 110–370)
UROBILINOGEN, URINE: 0.2 MG/DL (ref 0.2–1)
VITAMIN B-12: 384.3 PG/ML (ref 211–911)
WBC # BLD: 8.8 K/CU MM (ref 4–10.5)
WBC # BLD: 9.4 K/CU MM (ref 4–10.5)
WBC UA: ABNORMAL /HPF (ref 0–2)

## 2022-02-25 PROCEDURE — C1887 CATHETER, GUIDING: HCPCS

## 2022-02-25 PROCEDURE — 93458 L HRT ARTERY/VENTRICLE ANGIO: CPT

## 2022-02-25 PROCEDURE — 84484 ASSAY OF TROPONIN QUANT: CPT

## 2022-02-25 PROCEDURE — 6370000000 HC RX 637 (ALT 250 FOR IP): Performed by: INTERNAL MEDICINE

## 2022-02-25 PROCEDURE — C1769 GUIDE WIRE: HCPCS

## 2022-02-25 PROCEDURE — 3430000000 HC RX DIAGNOSTIC RADIOPHARMACEUTICAL: Performed by: NURSE PRACTITIONER

## 2022-02-25 PROCEDURE — 93571 IV DOP VEL&/PRESS C FLO 1ST: CPT

## 2022-02-25 PROCEDURE — 78452 HT MUSCLE IMAGE SPECT MULT: CPT

## 2022-02-25 PROCEDURE — 2500000003 HC RX 250 WO HCPCS

## 2022-02-25 PROCEDURE — G0378 HOSPITAL OBSERVATION PER HR: HCPCS

## 2022-02-25 PROCEDURE — 80053 COMPREHEN METABOLIC PANEL: CPT

## 2022-02-25 PROCEDURE — 82607 VITAMIN B-12: CPT

## 2022-02-25 PROCEDURE — 83880 ASSAY OF NATRIURETIC PEPTIDE: CPT

## 2022-02-25 PROCEDURE — 6360000002 HC RX W HCPCS: Performed by: INTERNAL MEDICINE

## 2022-02-25 PROCEDURE — 83550 IRON BINDING TEST: CPT

## 2022-02-25 PROCEDURE — 93306 TTE W/DOPPLER COMPLETE: CPT

## 2022-02-25 PROCEDURE — 82728 ASSAY OF FERRITIN: CPT

## 2022-02-25 PROCEDURE — 94761 N-INVAS EAR/PLS OXIMETRY MLT: CPT

## 2022-02-25 PROCEDURE — 83690 ASSAY OF LIPASE: CPT

## 2022-02-25 PROCEDURE — 2700000000 HC OXYGEN THERAPY PER DAY

## 2022-02-25 PROCEDURE — 71250 CT THORAX DX C-: CPT

## 2022-02-25 PROCEDURE — C1894 INTRO/SHEATH, NON-LASER: HCPCS

## 2022-02-25 PROCEDURE — 6370000000 HC RX 637 (ALT 250 FOR IP): Performed by: NURSE PRACTITIONER

## 2022-02-25 PROCEDURE — 6370000000 HC RX 637 (ALT 250 FOR IP): Performed by: PHYSICIAN ASSISTANT

## 2022-02-25 PROCEDURE — 36415 COLL VENOUS BLD VENIPUNCTURE: CPT

## 2022-02-25 PROCEDURE — 81001 URINALYSIS AUTO W/SCOPE: CPT

## 2022-02-25 PROCEDURE — 83540 ASSAY OF IRON: CPT

## 2022-02-25 PROCEDURE — 6360000002 HC RX W HCPCS

## 2022-02-25 PROCEDURE — 82962 GLUCOSE BLOOD TEST: CPT

## 2022-02-25 PROCEDURE — 84443 ASSAY THYROID STIM HORMONE: CPT

## 2022-02-25 PROCEDURE — 93017 CV STRESS TEST TRACING ONLY: CPT

## 2022-02-25 PROCEDURE — 85025 COMPLETE CBC W/AUTO DIFF WBC: CPT

## 2022-02-25 PROCEDURE — 99214 OFFICE O/P EST MOD 30 MIN: CPT | Performed by: INTERNAL MEDICINE

## 2022-02-25 PROCEDURE — 6360000004 HC RX CONTRAST MEDICATION

## 2022-02-25 PROCEDURE — 85045 AUTOMATED RETICULOCYTE COUNT: CPT

## 2022-02-25 PROCEDURE — 93458 L HRT ARTERY/VENTRICLE ANGIO: CPT | Performed by: INTERNAL MEDICINE

## 2022-02-25 PROCEDURE — 93571 IV DOP VEL&/PRESS C FLO 1ST: CPT | Performed by: INTERNAL MEDICINE

## 2022-02-25 PROCEDURE — 93010 ELECTROCARDIOGRAM REPORT: CPT | Performed by: INTERNAL MEDICINE

## 2022-02-25 PROCEDURE — 2580000003 HC RX 258: Performed by: INTERNAL MEDICINE

## 2022-02-25 PROCEDURE — 2709999900 HC NON-CHARGEABLE SUPPLY

## 2022-02-25 PROCEDURE — 82746 ASSAY OF FOLIC ACID SERUM: CPT

## 2022-02-25 PROCEDURE — A9500 TC99M SESTAMIBI: HCPCS | Performed by: NURSE PRACTITIONER

## 2022-02-25 RX ORDER — AMLODIPINE BESYLATE 5 MG/1
5 TABLET ORAL DAILY
Status: DISCONTINUED | OUTPATIENT
Start: 2022-02-25 | End: 2022-02-25

## 2022-02-25 RX ORDER — DEXTROSE MONOHYDRATE 25 G/50ML
12.5 INJECTION, SOLUTION INTRAVENOUS PRN
Status: DISCONTINUED | OUTPATIENT
Start: 2022-02-25 | End: 2022-02-25 | Stop reason: CLARIF

## 2022-02-25 RX ORDER — POLYETHYLENE GLYCOL 3350 17 G/17G
17 POWDER, FOR SOLUTION ORAL DAILY PRN
Status: DISCONTINUED | OUTPATIENT
Start: 2022-02-25 | End: 2022-02-26 | Stop reason: HOSPADM

## 2022-02-25 RX ORDER — TRAZODONE HYDROCHLORIDE 50 MG/1
25 TABLET ORAL NIGHTLY PRN
Status: DISCONTINUED | OUTPATIENT
Start: 2022-02-25 | End: 2022-02-26 | Stop reason: HOSPADM

## 2022-02-25 RX ORDER — ACETAMINOPHEN 650 MG/1
650 SUPPOSITORY RECTAL EVERY 6 HOURS PRN
Status: DISCONTINUED | OUTPATIENT
Start: 2022-02-25 | End: 2022-02-26 | Stop reason: HOSPADM

## 2022-02-25 RX ORDER — DEXTROSE MONOHYDRATE 50 MG/ML
100 INJECTION, SOLUTION INTRAVENOUS PRN
Status: DISCONTINUED | OUTPATIENT
Start: 2022-02-25 | End: 2022-02-26 | Stop reason: HOSPADM

## 2022-02-25 RX ORDER — SODIUM CHLORIDE 0.9 % (FLUSH) 0.9 %
5-40 SYRINGE (ML) INJECTION EVERY 12 HOURS SCHEDULED
Status: DISCONTINUED | OUTPATIENT
Start: 2022-02-25 | End: 2022-02-26 | Stop reason: HOSPADM

## 2022-02-25 RX ORDER — HYDRALAZINE HYDROCHLORIDE 20 MG/ML
10 INJECTION INTRAMUSCULAR; INTRAVENOUS EVERY 6 HOURS PRN
Status: DISCONTINUED | OUTPATIENT
Start: 2022-02-25 | End: 2022-02-26 | Stop reason: HOSPADM

## 2022-02-25 RX ORDER — LEVOTHYROXINE SODIUM 0.05 MG/1
50 TABLET ORAL DAILY
Status: DISCONTINUED | OUTPATIENT
Start: 2022-02-25 | End: 2022-02-26 | Stop reason: HOSPADM

## 2022-02-25 RX ORDER — ASPIRIN 81 MG/1
81 TABLET, CHEWABLE ORAL DAILY
Status: DISCONTINUED | OUTPATIENT
Start: 2022-02-26 | End: 2022-02-26 | Stop reason: HOSPADM

## 2022-02-25 RX ORDER — HYDRALAZINE HYDROCHLORIDE 20 MG/ML
10 INJECTION INTRAMUSCULAR; INTRAVENOUS EVERY 10 MIN PRN
Status: DISCONTINUED | OUTPATIENT
Start: 2022-02-25 | End: 2022-02-26 | Stop reason: HOSPADM

## 2022-02-25 RX ORDER — MIRTAZAPINE 15 MG/1
15 TABLET, FILM COATED ORAL NIGHTLY
COMMUNITY

## 2022-02-25 RX ORDER — SODIUM CHLORIDE 9 MG/ML
25 INJECTION, SOLUTION INTRAVENOUS PRN
Status: DISCONTINUED | OUTPATIENT
Start: 2022-02-25 | End: 2022-02-26 | Stop reason: HOSPADM

## 2022-02-25 RX ORDER — 0.9 % SODIUM CHLORIDE 0.9 %
500 INTRAVENOUS SOLUTION INTRAVENOUS ONCE
Status: DISCONTINUED | OUTPATIENT
Start: 2022-02-25 | End: 2022-02-25

## 2022-02-25 RX ORDER — ACETAMINOPHEN 325 MG/1
650 TABLET ORAL EVERY 4 HOURS PRN
Status: DISCONTINUED | OUTPATIENT
Start: 2022-02-25 | End: 2022-02-26 | Stop reason: HOSPADM

## 2022-02-25 RX ORDER — SODIUM CHLORIDE 9 MG/ML
INJECTION, SOLUTION INTRAVENOUS CONTINUOUS
Status: DISCONTINUED | OUTPATIENT
Start: 2022-02-25 | End: 2022-02-25

## 2022-02-25 RX ORDER — LOSARTAN POTASSIUM 25 MG/1
25 TABLET ORAL DAILY
Status: DISCONTINUED | OUTPATIENT
Start: 2022-02-25 | End: 2022-02-25

## 2022-02-25 RX ORDER — ACETAMINOPHEN 325 MG/1
650 TABLET ORAL EVERY 6 HOURS PRN
Status: DISCONTINUED | OUTPATIENT
Start: 2022-02-25 | End: 2022-02-26 | Stop reason: HOSPADM

## 2022-02-25 RX ORDER — FLUDROCORTISONE ACETATE 0.1 MG/1
0.1 TABLET ORAL DAILY
Status: DISCONTINUED | OUTPATIENT
Start: 2022-02-25 | End: 2022-02-26 | Stop reason: HOSPADM

## 2022-02-25 RX ORDER — ATORVASTATIN CALCIUM 40 MG/1
40 TABLET, FILM COATED ORAL NIGHTLY
Status: DISCONTINUED | OUTPATIENT
Start: 2022-02-25 | End: 2022-02-26 | Stop reason: HOSPADM

## 2022-02-25 RX ORDER — RANOLAZINE 500 MG/1
500 TABLET, EXTENDED RELEASE ORAL 2 TIMES DAILY
Status: DISCONTINUED | OUTPATIENT
Start: 2022-02-25 | End: 2022-02-26 | Stop reason: HOSPADM

## 2022-02-25 RX ORDER — DILTIAZEM HYDROCHLORIDE 120 MG/1
120 CAPSULE, COATED, EXTENDED RELEASE ORAL DAILY
Status: DISCONTINUED | OUTPATIENT
Start: 2022-02-25 | End: 2022-02-26 | Stop reason: HOSPADM

## 2022-02-25 RX ORDER — NICOTINE POLACRILEX 4 MG
15 LOZENGE BUCCAL PRN
Status: DISCONTINUED | OUTPATIENT
Start: 2022-02-25 | End: 2022-02-26 | Stop reason: HOSPADM

## 2022-02-25 RX ORDER — ONDANSETRON 4 MG/1
4 TABLET, ORALLY DISINTEGRATING ORAL EVERY 8 HOURS PRN
Status: DISCONTINUED | OUTPATIENT
Start: 2022-02-25 | End: 2022-02-26 | Stop reason: HOSPADM

## 2022-02-25 RX ORDER — FLUDROCORTISONE ACETATE 0.1 MG/1
0.1 TABLET ORAL DAILY
COMMUNITY

## 2022-02-25 RX ORDER — SODIUM CHLORIDE 0.9 % (FLUSH) 0.9 %
5-40 SYRINGE (ML) INJECTION PRN
Status: DISCONTINUED | OUTPATIENT
Start: 2022-02-25 | End: 2022-02-26 | Stop reason: HOSPADM

## 2022-02-25 RX ORDER — DONEPEZIL HYDROCHLORIDE 10 MG/1
10 TABLET, FILM COATED ORAL NIGHTLY
Status: DISCONTINUED | OUTPATIENT
Start: 2022-02-25 | End: 2022-02-26 | Stop reason: HOSPADM

## 2022-02-25 RX ORDER — ONDANSETRON 2 MG/ML
4 INJECTION INTRAMUSCULAR; INTRAVENOUS EVERY 6 HOURS PRN
Status: DISCONTINUED | OUTPATIENT
Start: 2022-02-25 | End: 2022-02-26 | Stop reason: HOSPADM

## 2022-02-25 RX ORDER — MIRTAZAPINE 15 MG/1
15 TABLET, FILM COATED ORAL NIGHTLY
Status: DISCONTINUED | OUTPATIENT
Start: 2022-02-25 | End: 2022-02-26 | Stop reason: HOSPADM

## 2022-02-25 RX ORDER — SODIUM CHLORIDE 9 MG/ML
INJECTION, SOLUTION INTRAVENOUS CONTINUOUS
Status: DISCONTINUED | OUTPATIENT
Start: 2022-02-25 | End: 2022-02-26 | Stop reason: HOSPADM

## 2022-02-25 RX ORDER — OXYCODONE HYDROCHLORIDE AND ACETAMINOPHEN 5; 325 MG/1; MG/1
1 TABLET ORAL EVERY 4 HOURS PRN
Status: DISCONTINUED | OUTPATIENT
Start: 2022-02-25 | End: 2022-02-26 | Stop reason: HOSPADM

## 2022-02-25 RX ADMIN — RANOLAZINE 500 MG: 500 TABLET, EXTENDED RELEASE ORAL at 21:51

## 2022-02-25 RX ADMIN — ATORVASTATIN CALCIUM 40 MG: 40 TABLET, FILM COATED ORAL at 21:51

## 2022-02-25 RX ADMIN — REGADENOSON 0.4 MG: 0.08 INJECTION, SOLUTION INTRAVENOUS at 11:08

## 2022-02-25 RX ADMIN — LEVOTHYROXINE SODIUM 50 MCG: 0.05 TABLET ORAL at 06:06

## 2022-02-25 RX ADMIN — KIT FOR THE PREPARATION OF TECHNETIUM TC99M SESTAMIBI 30 MILLICURIE: 1 INJECTION, POWDER, LYOPHILIZED, FOR SOLUTION PARENTERAL at 13:12

## 2022-02-25 RX ADMIN — TRAZODONE HYDROCHLORIDE 25 MG: 50 TABLET ORAL at 21:51

## 2022-02-25 RX ADMIN — MIRTAZAPINE 15 MG: 15 TABLET, FILM COATED ORAL at 21:51

## 2022-02-25 RX ADMIN — ASPIRIN 324 MG: 81 TABLET, CHEWABLE ORAL at 00:38

## 2022-02-25 RX ADMIN — DONEPEZIL HYDROCHLORIDE 10 MG: 10 TABLET, FILM COATED ORAL at 21:51

## 2022-02-25 RX ADMIN — DILTIAZEM HYDROCHLORIDE 120 MG: 120 CAPSULE, COATED, EXTENDED RELEASE ORAL at 16:58

## 2022-02-25 RX ADMIN — KIT FOR THE PREPARATION OF TECHNETIUM TC99M SESTAMIBI 10 MILLICURIE: 1 INJECTION, POWDER, LYOPHILIZED, FOR SOLUTION PARENTERAL at 13:12

## 2022-02-25 RX ADMIN — SODIUM CHLORIDE: 9 INJECTION, SOLUTION INTRAVENOUS at 06:02

## 2022-02-25 RX ADMIN — SODIUM CHLORIDE, PRESERVATIVE FREE 10 ML: 5 INJECTION INTRAVENOUS at 21:52

## 2022-02-25 ASSESSMENT — ENCOUNTER SYMPTOMS
DIARRHEA: 0
VOMITING: 0
ABDOMINAL PAIN: 0
SHORTNESS OF BREATH: 1
COUGH: 0
CHEST TIGHTNESS: 0

## 2022-02-25 ASSESSMENT — PAIN SCALES - GENERAL
PAINLEVEL_OUTOF10: 0
PAINLEVEL_OUTOF10: 0

## 2022-02-25 NOTE — PLAN OF CARE
Spoke with Deuce Miranda RN in regards to obtaining covid test for patient to come to the cath lab after 1430 today.  [facility] 2/25/2022

## 2022-02-25 NOTE — PROGRESS NOTES
V2.0  [facility] Hospitalist Progress Note      Name:  Karri Hanley /Age/Sex: 1940  ([de-identified] y.o. male)   MRN & CSN:  [id number] & [id number] Encounter Date/Time: 2022 7:46 AM EST    Location:  Cath Lab/NONE PCP: Guerrero Liu, [address] Day: 2    Assessment and Plan:   Karri Hanley is a [de-identified] y.o. male with past medical history of CAD, T2DM hyperlipidemia, hypertension, pulmonary fibrosis who presents with Chest pain     Chest pain  -Troponin negative x2  -EKG with no acute ischemic change  -Cath 2020 with patent stents  -stress test pending   -cardiology following     Generalized weakness  - presented with several weeks of slowly worsening generalized weakness  -Appears deconditioned  -TSH, B12/folate, anemia panel pending  -PT/OT -discussed goals of care with patient and family, patient may be hospice appropriate however patient has responded well to therapy in the past    Elevated lipase   - lipase 403, LFTs otherwise WNL  - reports chronic intermittent LLQ abdominal pain, no abdominal pain currently and benign abdominal exam   - repeat lipase pending, monitor abdominal exam and consider CT scan if warranted    CAD  -With history of LAD stent 2019   -continue aspirin, statin    History of multifocal atrial tachycardia  -Continue Cardizem    Hypertension   - with -180s   -Has not tolerated antihypertensives in the past due to significant orthostatic hypotension  -Cardiology initially recommended to start losartan however after further discussion we will hold for now and continue to monitor, patient is also stressed which could be contributing to hypertension    Orthostatic hypotension  -Continue fludrocortisone and compression stockings    T2DM  -Hold home Metformin  -Continue SSI  -Monitor POCT glucose  -Hypoglycemia management protocol    Hypothyroidism  -Continue home Synthroid    Pulmonary fibrosis  Chronic respiratory failure  -On 3.5 L nasal cannula at baseline, no increased oxygen requirements  -Chest x-ray with chronic interstitial changes, mild increased airspace disease in the left upper lobe  CT chest without contrast with chronic findings of left upper lobe lobectomy with left thorax volume loss, pulmonary fibrosis with honeycombing and traction bronchiectasis  - significant dyspnea with exertion, progressively worsening   - may be hospice appropriate, discussed with family    History of lung mass  -Status post left upper lobectomy 1/2019    Depression/anxiety  -Continue home Remeron    Dementia  -Continue home Aricept    This patient was seen and examined in conjunction/discussed with Dr. Vera Patel. He was agreeable with the plan and management as dictated above. Diet Diet NPO   DVT Prophylaxis [] Lovenox, []  Heparin, [] SCDs, [x] Ambulation,  [] Eliquis, [] Xarelto   Code Status Limited   Disposition TBD pending PT/OT and GOC discussion    Surrogate Decision Maker/ POA      Subjective:     Chief Complaint: Chest Pain, Fatigue, Fever, and Chills     Patient seen and examined at bedside. Still complaining of significant generalized weakness and fatigue. Family states that they have noticed a decline over the last several weeks. He has a history of pulmonary fibrosis and was told he only had \"20% of his right lung function\" he states he had some chest pain during his stress test.  He has chronic intermittent left lower quadrant abdominal pain. Denies epigastric pain. Denies history of pancreatitis. Currently denies nausea, vomiting, loss of appetite. He has significant dyspnea on exertion secondary to his pulmonary fibrosis. He was initially on 2.5 L nasal cannula, wife recently increased this to 3.5.          Review of Systems:    Ten point ROS reviewed negative, unless as noted above    Objective:   No intake or output data in the 24 hours ending 02/25/22 1426     Vitals:   Vitals:    02/25/22 1300   BP: (!) 171/83   Pulse: 64   Resp: 16   Temp: 97.7 °F -32 degrees    T Axis 73 degrees    Diagnosis       Sinus rhythm with short KY  Left axis deviation  Incomplete right bundle branch block  Moderate voltage criteria for LVH, may be normal variant  Abnormal ECG  When compared with ECG of 14-MAY-2021 19:57,  KY interval has decreased  Confirmed by Cheryl James ([id number]) on 2/25/2022 10:42:18 AM     Respiratory Panel, Molecular, with COVID-19 (Restricted: peds pts or suitable admitted adults)    Collection Time: 02/24/22 11:43 PM    Specimen: Nasopharyngeal   Result Value Ref Range    Adenovirus Detection by PCR NOT DETECTED NOT DETECTED    Coronavirus 229E PCR NOT DETECTED NOT DETECTED    Coronavirus HKU1 PCR NOT DETECTED NOT DETECTED    Coronavirus NL63 PCR NOT DETECTED NOT DETECTED    Coronavirus OC43 PCR NOT DETECTED NOT DETECTED    SARS-CoV-2 NOT DETECTED NOT DETECTED    Human Metapneumovirus PCR NOT DETECTED NOT DETECTED    Rhinovirus Enterovirus PCR NOT DETECTED NOT DETECTED    Influenza A by PCR NOT DETECTED NOT DETECTED    Influenza A H1 Pandemic PCR NOT DETECTED NOT DETECTED    Influenza A H1 (2009) PCR NOT DETECTED NOT DETECTED    Influenza A H3 PCR NOT DETECTED NOT DETECTED    Influenza B by PCR NOT DETECTED NOT DETECTED    Parainfluenza 1 PCR NOT DETECTED NOT DETECTED    Parainfluenza 2 PCR NOT DETECTED NOT DETECTED    Parainfluenza 3 PCR NOT DETECTED NOT DETECTED    Parainfluenza 4 PCR NOT DETECTED NOT DETECTED    RSV PCR NOT DETECTED NOT DETECTED    Bordetella parapertussis by PCR NOT DETECTED NOT DETECTED    B Pertussis by PCR NOT DETECTED NOT DETECTED    Chlamydophila Pneumonia PCR NOT DETECTED NOT DETECTED    Mycoplasma pneumo by PCR NOT DETECTED NOT DETECTED   Blood Gas, Venous    Collection Time: 02/24/22 11:45 PM   Result Value Ref Range    pH, Ray 7.38 7.32 - 7.42    pCO2, Ray 51 38 - 52 mmHG    pO2, Ray 68 (H) 28 - 48 mmHG    Base Exc, Mixed 3.9 (H) 0 - 1.2    HCO3, Venous 30.2 (H) 19 - 25 MMOL/L    O2 Sat, Ray 91.4 (H) 50 - 70 %   CBC with Auto Differential    Collection Time: 02/25/22 12:35 AM   Result Value Ref Range    WBC 9.4 4.0 - 10.5 K/CU MM    RBC 3.96 (L) 4.6 - 6.2 M/CU MM    Hemoglobin 11.7 (L) 13.5 - 18.0 GM/DL    Hematocrit 37.9 (L) 42 - 52 %    MCV 95.7 78 - 100 FL    MCH 29.5 27 - 31 PG    MCHC 30.9 (L) 32.0 - 36.0 %    RDW 13.2 11.7 - 14.9 %    Platelets 502 834 - 972 K/CU MM    MPV 10.6 7.5 - 11.1 FL    Differential Type AUTOMATED DIFFERENTIAL     Segs Relative 56.1 36 - 66 %    Lymphocytes % 29.2 24 - 44 %    Monocytes % 6.9 (H) 0 - 4 %    Eosinophils % 6.2 (H) 0 - 3 %    Basophils % 1.4 (H) 0 - 1 %    Segs Absolute 5.3 K/CU MM    Lymphocytes Absolute 2.8 K/CU MM    Monocytes Absolute 0.7 K/CU MM    Eosinophils Absolute 0.6 K/CU MM    Basophils Absolute 0.1 K/CU MM    Nucleated RBC % 0.0 %    Total Nucleated RBC 0.0 K/CU MM    Total Immature Neutrophil 0.02 K/CU MM    Immature Neutrophil % 0.2 0 - 0.43 %   Comprehensive Metabolic Panel    Collection Time: 02/25/22 12:35 AM   Result Value Ref Range    Sodium 141 135 - 145 MMOL/L    Potassium 4.0 3.5 - 5.1 MMOL/L    Chloride 103 99 - 110 mMol/L    CO2 24 21 - 32 MMOL/L    BUN 20 6 - 23 MG/DL    CREATININE 1.0 0.9 - 1.3 MG/DL    Glucose 113 (H) 70 - 99 MG/DL    Calcium 9.5 8.3 - 10.6 MG/DL    Albumin 3.7 3.4 - 5.0 GM/DL    Total Protein 7.2 6.4 - 8.2 GM/DL    Total Bilirubin 0.4 0.0 - 1.0 MG/DL    ALT 19 10 - 40 U/L    AST 27 15 - 37 IU/L    Alkaline Phosphatase 75 40 - 128 IU/L    GFR Non-African American >60 >60 mL/min/1.73m2    GFR African American >60 >60 mL/min/1.73m2    Anion Gap 14 4 - 16   Troponin    Collection Time: 02/25/22 12:35 AM   Result Value Ref Range    Troponin T <0.010 <0.01 NG/ML   Lipase    Collection Time: 02/25/22 12:35 AM   Result Value Ref Range    Lipase 403 (H) 13 - 60 IU/L   Brain Natriuretic Peptide    Collection Time: 02/25/22 12:35 AM   Result Value Ref Range    Pro-.8 <300 PG/ML   Urinalysis    Collection Time: 02/25/22  1:59 AM   Result Value Ref Range Color, UA YELLOW YELLOW    Clarity, UA CLEAR CLEAR    Glucose, Urine NEGATIVE NEGATIVE MG/DL    Bilirubin Urine NEGATIVE NEGATIVE MG/DL    Ketones, Urine TRACE (A) NEGATIVE MG/DL    Specific Gravity, UA 1.023 1.001 - 1.035    Blood, Urine NEGATIVE NEGATIVE    pH, Urine 5.5 5.0 - 8.0    Protein, UA TRACE (A) NEGATIVE MG/DL    Urobilinogen, Urine 0.2 0.2 - 1.0 MG/DL    Nitrite Urine, Quantitative NEGATIVE NEGATIVE    Leukocyte Esterase, Urine NEGATIVE NEGATIVE    RBC, UA 1 0 - 3 /HPF    WBC, UA NONE SEEN 0 - 2 /HPF    Bacteria, UA NEGATIVE NEGATIVE /HPF    Mucus, UA RARE (A) NEGATIVE HPF   Troponin    Collection Time: 02/25/22  8:04 AM   Result Value Ref Range    Troponin T <0.010 <0.01 NG/ML        Imaging/Diagnostics Last 24 Hours   CT CHEST WO CONTRAST    Result Date: 2/25/2022  EXAMINATION: CT OF THE CHEST WITHOUT CONTRAST 2/25/2022 12:51 am TECHNIQUE: CT of the chest was performed without the administration of intravenous contrast. Multiplanar reformatted images are provided for review. Dose modulation, iterative reconstruction, and/or weight based adjustment of the mA/kV was utilized to reduce the radiation dose to as low as reasonably achievable. COMPARISON: 10/10/2019 HISTORY: ORDERING SYSTEM PROVIDED HISTORY: weakness TECHNOLOGIST PROVIDED HISTORY: Reason for exam:->weakness Decision Support Exception - unselect if not a suspected or confirmed emergency medical condition->Emergency Medical Condition (MA) Reason for Exam: weakness FINDINGS: Mediastinum: Few mildly prominent mediastinal lymph nodes particularly in the azygous region. Bulky three-vessel calcific coronary artery disease. The heart is borderline enlarged. Thoracic aorta is normal caliber. Lungs/pleura: Status post left upper lobectomy with left hemithorax volume loss. There is extensive bilateral pulmonary fibrosis with honeycombing particularly in the left lower lobe.   Localized pleural thickening or loculated small volume of pleural fluid in the anterior superior thorax, unchanged. There is bilateral lower lobe traction bronchiectasis. There is no pneumothorax or free fluid pleural effusion. Upper Abdomen: The gallbladder is surgically absent. Soft Tissues/Bones: No acute bone finding. Chronic findings of left upper lobectomy with left hemithorax volume loss, pulmonary fibrosis with honeycombing and traction bronchiectasis. No definite superimposed acute disease. XR CHEST PORTABLE    Result Date: 2/25/2022  EXAMINATION: ONE XRAY VIEW OF THE CHEST 2/24/2022 11:38 pm COMPARISON: 05/14/2021, CT chest on 10/10/2019 HISTORY: ORDERING SYSTEM PROVIDED HISTORY: chest pain TECHNOLOGIST PROVIDED HISTORY: Reason for exam:->chest pain Reason for Exam: chest pain Additional signs and symptoms: chest pain Relevant Medical/Surgical History: chest pain FINDINGS: Cardiomegaly. Progression of parenchymal airspace disease within the left upper lobe, with similar appearing chronic interstitial lung disease noted on the right. No acute osseous abnormality is identified. No pneumothorax. Chronic interstitial changes are seen in the lungs, best appreciated on prior CT imaging with known fibrosis, honeycombing and traction bronchiectasis. Changes appear stable on the right. Mild increased airspace disease in the left upper lobe could represent progression of disease, or possibly related to acute superimposed atelectasis or pneumonia.        Electronically signed by Connie Mott PA-C on 2/25/2022 at 2:26 PM

## 2022-02-25 NOTE — CONSULTS
INPATIENT CARDIOLOGY CONSULT NOTE         Reason for consultation:  Chest Pain     Referring physician:  Heike Calderon MD     Primary care physician: Emilee Blank, APRN - CNP      Dear Heike Calderon MD Thank you for the consult    History of present illness:Jefferson is a [de-identified] y. o.year old who  presents with chest pain/shortness of breath      Patient follows up with Dr. Sarahi Lynn as outpatient    Patient has prior medical history significant for pulmonary fibrosis, history of coronary disease s/p PCI to LAD post FFR, history of repeat heart catheterization in November with patent LAD stent. Past medical history is also significant for diabetes mellitus hypertension hyperlipidemia. Patient lives at home with his wife. With other family support close by. As per patient and wife he has been feeling lethargic for the past several weeks now. Patient does not follow-up with pulmonologist currently, as his previous pulmonologist in [facility] retired. Last evening patient experienced chest discomfort rated as 10 out of 10 retrosternal at rest nonexertional nonradiating lasting for 30 minutes. He has been intermittently fatigued with worsening shortness of breath over the past several days. Upon arrival cardiac troponins were obtained which were negative. proBNP 255. BUN 20 creatinine 1.0. Sodium 141. Hemoglobin 11.7 hematocrit 37.9 platelets 769. EKG shows sinus rhythm with signs of left ventricular hypertrophy.   No acute ST-T changes noted    Patient currently denies any active chest pains    Past medical history:    has a past medical history of Arthritis, Asthma, Back pain, CAD (coronary artery disease), cardiovascular stress test, Diabetes mellitus ([facility] [address] echocardiogram, Glaucoma, H/O 24 hour EKG monitoring, H/O cardiac catheterization, H/O cardiovascular stress test, H/O chest pain, H/O chest x-ray, H/O dizziness, H/O Doppler ultrasound, H/O echocardiogram, H/O gastroesophageal reflux (GERD), H/O pneumothorax, History of cardiac monitoring, History of complete ECG, History of exercise stress test, History of stress test, [place] (hard of hearing), Hx of CT scan of chest, Hx of Doppler echocardiogram, Hyperlipidemia, Hypertension, Left Lung Cancer, Lung nodule, Memory loss, MVP (mitral valve prolapse), Old MI (myocardial infarction), Shortness of breath on exertion, Teeth missing, Thyroid disease, Wears dentures, Wears glasses, and Wears hearing aid. Past surgical history:   has a past surgical history that includes bronchoscopy (N/A, 12/7/2018); eye surgery (Bilateral, 2000's); Dental surgery; knee surgery (Left, 1980's Or 1990's); Knee arthroscopy (Bilateral, 1970's To 1990's); Vasectomy (1980's); Cardiac catheterization (1980's Or 1990's); Colonoscopy (Last Done In 2006); Cholecystectomy, laparoscopic (2002); Lung removal, total (Left, 1/28/2019); and Colonoscopy (N/A, 4/9/2019). Social History:   reports that he has never smoked. He has never used smokeless tobacco. He reports that he does not drink alcohol and does not use drugs.   Family history:   no family history of CAD, STROKE of DM    Allergies   Allergen Reactions    Norco [Hydrocodone-Acetaminophen] Other (See Comments)     Drops BP.        sodium chloride flush 0.9 % injection 5-40 mL, 2 times per day  sodium chloride flush 0.9 % injection 5-40 mL, PRN  0.9 % sodium chloride infusion, PRN  ondansetron (ZOFRAN-ODT) disintegrating tablet 4 mg, Q8H PRN   Or  ondansetron (ZOFRAN) injection 4 mg, Q6H PRN  acetaminophen (TYLENOL) tablet 650 mg, Q6H PRN   Or  acetaminophen (TYLENOL) suppository 650 mg, Q6H PRN  polyethylene glycol (GLYCOLAX) packet 17 g, Daily PRN  [START ON 2/26/2022] aspirin chewable tablet 81 mg, Daily  atorvastatin (LIPITOR) tablet 40 mg, Nightly  enoxaparin (LOVENOX) injection 40 mg, Daily  dilTIAZem (CARDIZEM CD) extended release capsule 120 mg, Daily  donepezil (ARICEPT) tablet 10 mg, Nightly  levothyroxine (SYNTHROID) tablet 50 mcg, Daily  fludrocortisone (FLORINEF) tablet 0.1 mg, Daily  mirtazapine (REMERON) tablet 15 mg, Nightly  glucose (GLUTOSE) 40 % oral gel 15 g, PRN  glucagon (rDNA) injection 1 mg, PRN  dextrose 5 % solution, PRN  hydrALAZINE (APRESOLINE) 10 mg in sodium chloride 0.9 % 50 mL ivpb, Q6H PRN  dextrose bolus (hypoglycemia) 10% 125 mL, PRN   Or  dextrose bolus (hypoglycemia) 10% 250 mL, PRN  0.9 % sodium chloride infusion, Continuous  technetium sestamibi (CARDIOLITE) injection 10 millicurie, ONCE PRN  technetium sestamibi (CARDIOLITE) injection 30 millicurie, ONCE PRN  amLODIPine (NORVASC) tablet 5 mg, Daily      Current Facility-Administered Medications   Medication Dose Route Frequency Provider Last Rate Last Admin    sodium chloride flush 0.9 % injection 5-40 mL  5-40 mL IntraVENous 2 times per day Herson Reed MD        sodium chloride flush 0.9 % injection 5-40 mL  5-40 mL IntraVENous PRN Herson Reed MD        0.9 % sodium chloride infusion  25 mL IntraVENous PRN Herson Reed MD        ondansetron (ZOFRAN-ODT) disintegrating tablet 4 mg  4 mg Oral Q8H PRN Herson Reed MD        Or    ondansetron (ZOFRAN) injection 4 mg  4 mg IntraVENous Q6H PRN Herson Reed MD        acetaminophen (TYLENOL) tablet 650 mg  650 mg Oral Q6H PRN Herson Reed MD        Or   [employer]pa Leung acetaminophen (TYLENOL) suppository 650 mg  650 mg Rectal Q6H PRN Herson Reed MD        polyethylene glycol (GLYCOLAX) packet 17 g  17 g Oral Daily PRN MD [employer] Jumana Gains ON 2/26/2022] aspirin chewable tablet 81 mg  81 mg Oral Daily Herson Reed MD        atorvastatin (LIPITOR) tablet 40 mg  40 mg Oral Nightly Herson Reed MD        enoxaparin (LOVENOX) injection 40 mg  40 mg SubCUTAneous Daily Herson Reed MD        dilTIAZem (CARDIZEM CD) extended release capsule 120 mg  120 mg Oral Daily Herson Reed MD        donepezil (ARICEPT) tablet 10 mg  10 mg Oral Nightly Shruti Farmer MD        levothyroxine (SYNTHROID) tablet 50 mcg  50 mcg Oral Daily Shruti Farmer MD   50 mcg at 02/25/22 0606    fludrocortisone (FLORINEF) tablet 0.1 mg  0.1 mg Oral Daily Shruti Farmer MD        mirtazapine (REMERON) tablet 15 mg  15 mg Oral Nightly Shruti Farmer MD        glucose (GLUTOSE) 40 % oral gel 15 g  15 g Oral PRN Shruti Farmer MD        glucagon (rDNA) injection 1 mg  1 mg IntraMUSCular PRN Shruti Farmer MD        dextrose 5 % solution  100 mL/hr IntraVENous PRN Shruti Farmer MD        hydrALAZINE (APRESOLINE) 10 mg in sodium chloride 0.9 % 50 mL ivpb  10 mg IntraVENous Q6H PRN Shruti Farmer MD        dextrose bolus (hypoglycemia) 10% 125 mL  125 mL IntraVENous PRN Shruti Farmer MD        Or    dextrose bolus (hypoglycemia) 10% 250 mL  250 mL IntraVENous PRN Shruti Farmer MD        0.9 % sodium chloride infusion   IntraVENous Continuous Shruti Farmer  mL/hr at 02/25/22 0602 New Bag at 02/25/22 0602    technetium sestamibi (CARDIOLITE) injection 10 millicurie  10 millicurie IntraVENous ONCE PRN ASHLEE Lama CNP        technetium sestamibi (CARDIOLITE) injection 30 millicurie  30 millicurie IntraVENous ONCE PRN ASHLEE Lmaa CNP        amLODIPine (NORVASC) tablet 5 mg  5 mg Oral Daily Kira Johnston PA-C             Review of Systems:     · Constitutional: No Fever or Weight Loss   · Eyes: No Decreased Vision  · ENT: No Headaches, Hearing Loss or Vertigo  · Cardiovascular:  no chest pain,  no dyspnea on exertion,  no palpitations or loss of consciousness  · Respiratory: No cough or wheezing    · Gastrointestinal: No abdominal pain, appetite loss, blood in stools, constipation, diarrhea or heartburn  · Genitourinary: No dysuria, trouble voiding, or hematuria  · Musculoskeletal:  No gait disturbance, weakness or joint complaints  · Integumentary: No rash or pruritis  · Neurological: No TIA or stroke symptoms  · Psychiatric: No anxiety or depression  · Endocrine: No malaise, fatigue or temperature intolerance  · Hematologic/Lymphatic: No bleeding problems, blood clots or swollen lymph nodes  · Allergic/Immunologic: No nasal congestion or hives    All other systems were reviewed and were negative otherwise. Physical Examination:      Vitals:    02/25/22 0538   BP: (!) 178/92   Pulse: 63   Resp:    Temp:    SpO2:       Wt Readings from Last 3 Encounters:   02/24/22 195 lb (88.5 kg)   08/18/21 190 lb (86.2 kg)   07/14/21 190 lb 12.8 oz (86.5 kg)     Body mass index is 26.45 kg/m². General Appearance:  No distress, conversant  Constitutional:  Well developed, Well nourished  HEENT:  Normocephalic, Atraumatic, Oropharynx moist   Nose normal. Neck Supple Carotid: no carotid bruit  Eyes:  Conjunctiva normal, No discharge. Respiratory:    Normal breath sounds, No respiratory distress, No wheezing, no use of accessory muscles, diaphragm movement is normal  No chest Tenderness  Cardiovascular: S1-S2 No murmurs auscultated. No rubs, thrills or gallops. Normal  rhythm. Pedal pulses are normal. No pedal edema  GI:  Soft Non tender, non distended. Musculoskeletal:   No tenderness, No cyanosis, No clubbing. Integument:  Warm, Dry, No erythema, No rash. Lymphatic:  No lymphadenopathy noted. Neurologic:  Alert & oriented x 3  No focal deficits noted. Psychiatric:  Affect normal, Judgment normal, Mood normal.       Lab Review     Recent Labs     02/25/22  0035   WBC 9.4   HGB 11.7*   HCT 37.9*         Recent Labs     02/25/22  0035      K 4.0      CO2 24   BUN 20   CREATININE 1.0     Recent Labs     02/25/22 0035   AST 27   ALT 19   BILITOT 0.4   ALKPHOS 75     No results for input(s): TROPONINI in the last 72 hours.   No results found for: BNP  Lab Results   Component Value Date    INR 0.93 11/04/2020 PROTIME 11.3 (L) 11/04/2020         All labs, images, EKGs were personally reviewed      Assessment: [de-identified] y. o.year old with PMH of  has a past medical history of Arthritis, Asthma, Back pain, CAD (coronary artery disease), cardiovascular stress test, Diabetes mellitus ([facility] [address] echocardiogram, Glaucoma, H/O 24 hour EKG monitoring, H/O cardiac catheterization, H/O cardiovascular stress test, H/O chest pain, H/O chest x-ray, H/O dizziness, H/O Doppler ultrasound, H/O echocardiogram, H/O gastroesophageal reflux (GERD), H/O pneumothorax, History of cardiac monitoring, History of complete ECG, History of exercise stress test, History of stress test, [place] (hard of hearing), Hx of CT scan of chest, Hx of Doppler echocardiogram, Hyperlipidemia, Hypertension, Left Lung Cancer, Lung nodule, Memory loss, MVP (mitral valve prolapse), Old MI (myocardial infarction), Shortness of breath on exertion, Teeth missing, Thyroid disease, Wears dentures, Wears glasses, and Wears hearing aid. Medical Decision Making :       1. Chest pain  2. History of coronary disease s/p PCI to LAD    Atypical symptoms, possible aggravation of underlying CAD versus pulmonary fibrosis. Obtain stress MPI for risk ratification  Obtain echocardiogram.  Stress test back in October 2020, followed by heart catheterization in November 2020. Continue with medical therapy in the interim with  Aspirin 81 mg  Cardizem 120 mg  Resume Ranexa  Patient not maintained on beta-blocker as outpatient ? Orthostatic hypotension   While on dual antianginal with Cardizem and Ranexa  Stop Norvasc as patient is already on Cardizem    3. Shortness of breath with chronic respiratory failure  4. Pulmonary fibrosis    Patient does not actively follow-up with pulmonology in [facility]. Offered local pulmonary consultations however patient and family will consider as outpatient.     5. Essential hypertension:    Poorly controlled   Continue with Cardizem 120 mg  (heart rate 60 bpm) Add losartan 25 mg daily  Close monitoring of blood pressure in outpatient setting, uptitrate ARBs as needed    6. Hyperlipidemia: Continue with Lipitor 40 mg daily    7. Type 2 diabetes mellitus: Patient is on Metformin. Continue      8. History of orthostatic hypotension: On Florinef. Continue next    9. History of possible multifocal atrial tachycardia: Maintained on Cardizem as outpatient. Continue    10. DVT prophylaxis on Lovenox.   Continue    Close outpatient follow-up with Dr. Sarahi Lynn    Thank you for the consult    Dr. Gaye Levy  2/25/2022 10:54 AM

## 2022-02-25 NOTE — ED PROVIDER NOTES
Triage Chief Complaint:   Chest Pain, Fatigue, Fever, and Chills    [place]:  Today in the ED I had the pleasure of caring for Janessa Grady who is a [de-identified] y.o. male that presents today to the emergency department. Brought in by chest pain. Fatigue fever chills. Generalized weakness. Patient has a history of coronary disease as well as severe pulmonary fibrosis. He apparently has 20% of lung capacity on one lung. He gets significantly dyspneic on exertion which is baseline for him. However seems to be much worse. Generalized weakness is much worse. To the point where he is having difficulty getting around. Began having midsternal chest pain today. Nonradiating. Patient was concerned that it was a heart attack he does have a history of coronary artery disease. No history of pulmonary embolism. ROS:  REVIEW OF SYSTEMS    At least 10 systems reviewed      All other review of systems are negative  See HPI and nursing notes for additional information       Past Medical History:   Diagnosis Date    Arthritis     \"Back, Knees\"    Asthma     Back pain     \"At Times\"    CAD (coronary artery disease)     cardiovascular stress test 10/15/2020    possible afib or atrial flutter post infusion severe anterior medium to large ischemia     Diabetes mellitus ([facility] [address]     dx 5+ yrs ago-     echocardiogram 11/03/2020    EF 50-55% mild tricuspid regurg no significant valvular disease noted.  Glaucoma     Bilateral Eyes    H/O 24 hour EKG monitoring 11-    Predominantly SR - avg rate of 69bpm. Lowest rate 46 bpm at 0622. Frequent isolated 5954 PVCs noted mostly in bigemenal pattern w/out complex arrhythmias. Five beat run of atrial tachycardia at 1228. No palpitations or dizziness reported in patient's daily activity report. (12-, )    H/O cardiac catheterization 06-    Noncritical diffuse CAD w/no critical stenosis.  Diag borderline significant stenosis, not large enough fo percutaneous intervention. No significant angiographic progression of atherosclerosis since cath in 1997.   ( per Dr. Reilly Daniel at [facility]. Preserved vent function. MVP. CAD w/normal LM, 30-40% stenosis LAD, 70-80% stenosis of ostium & prox seg diag branch, normal left CX & RCA.)    H/O cardiovascular stress test 1/30/2013, 06- 1/30/2013-Normal study. Normal perfusion in all regions. EF 62%. 06--EF=60%. Normal. Normal pattern of perfusion. LV normal size. No wall abnormality. Exercise capacity good. (05-, 12-, 06-, , )    H/O chest pain     H/O chest x-ray 06-    Negative. Dx was dyspnea and CAD.  (02-)    H/O dizziness     H/O Doppler ultrasound 11-    (Carotid) Intimal thickening but no significant atherosclerotic plaque noted in right or left ICA. Doppler flow velocities w/in right and left ICA WNL.  H/O echocardiogram 1/30/2013, 05-    1/30/2013- LVSF normal. EF 50-55%. Impaired LV relaxation. Trace MR. Trace TR;   5- Normal LV size and systolic function. EF=60%. Chamber dimensions WNL. Mild concentric LV hypertrophy. Valves appear structurally normal.-OICC       H/O gastroesophageal reflux (GERD)     H/O pneumothorax Dx 1960    \"Both Sides\"    History of cardiac monitoring 12/03/2018    9 beat run of SVT, no other arrhythmias noted, primarily sinus rythym    History of complete ECG 06/06/2011    (06-, 07-, 06-, 06-)    History of exercise stress test 05/21/2019    Treadmill, Stopped due to fatigue and  Dyspnea.  History of stress test 11/07/2018    EF 61%, Normal    [place] (hard of hearing)     Bilateral Hearing Aids    Hx of CT scan of chest 11/12/2018    3 mm indeterminate solid nonobstructing endobronchial nodule of the proximal left upper lobe bronchus. Further evaluation with endoscopy is recommended.   Bilateral basilar predominant intersitial changes suggestive of nonspecific interstitial pneumonitis.     Hx of Doppler echocardiogram 11/20/2018    EF55-60%,no valvular disease    Hyperlipidemia     Hypertension     Left Lung Cancer Dx 11-18    LEFT UPPER LOBECTOMY 1/28/19    Lung nodule     \"they say I have a spot on my lung- and mass in left lung so thats why doing the bronch\"( 12/7/2018)    Memory loss     MVP (mitral valve prolapse)     follows with Dr Porras Blocker (myocardial infarction)     \"had heart attack 30 yrs ago a mild one\"    Shortness of breath on exertion     Teeth missing     Upper And Lower    Thyroid disease     Wears dentures     Full Upper    Wears glasses     Wears hearing aid     Bilateral Ears     Past Surgical History:   Procedure Laterality Date    BRONCHOSCOPY N/A 12/7/2018    BRONCHOSCOPY/TRANSBRONCHIAL LUNG BIOPSY performed by Anabela Harding MD at [address]  [address] Or 1990's    CHOLECYSTECTOMY, LAPAROSCOPIC  2002    COLONOSCOPY  Last Done In 2006    Polyps Removed In Past    COLONOSCOPY N/A 4/9/2019    COLONOSCOPY DIAGNOSTIC performed by Branden Bunch MD at [address] [facility]      Teeth Extracted In Past    EYE SURGERY Bilateral 2000's    \"Laser For Glaucoma\"    KNEE ARTHROSCOPY Bilateral 1970's To 1990's    \"Numerous\"    KNEE SURGERY Left 1980's Or 1990's    LUNG REMOVAL, TOTAL Left 1/28/2019    THORACOTOMY LEFT UPPER LOBECTOMY ROBOTIC ASSISTED performed by Suyapa Pepper MD at [address]  1980's     Family History   Problem Relation Age of Onset    Heart Disease Mother         Enlarged Heart    High Blood Pressure Mother     Heart Attack Father     Stroke Father     COPD Sister     Diabetes Brother      Social History     Socioeconomic History    Marital status:      Spouse name: Not on file    Number of children: 3    Years of education: Not on file    Highest education level: Not on file   Occupational History    Occupation: Retired     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   Tobacco Use    Smoking status: Never Smoker    Smokeless tobacco: Never Used   Vaping Use    Vaping Use: Never used   Substance and Sexual Activity    Alcohol use: Never    Drug use: No    Sexual activity: Not Currently     Partners: Female   Other Topics Concern    Not on file   Social History Narrative    Not on file     Social Determinants of Health     Financial Resource Strain:     Difficulty of Paying Living Expenses: Not on file   Food Insecurity:     Worried About Running Out of Food in the Last Year: Not on file    Marcos of Food in the Last Year: Not on file   Transportation Needs:     Lack of Transportation (Medical): Not on file    Lack of Transportation (Non-Medical): Not on file   Physical Activity:     Days of Exercise per Week: Not on file    Minutes of Exercise per Session: Not on file   Stress:     Feeling of Stress : Not on file   Social Connections:     Frequency of Communication with Friends and Family: Not on file    Frequency of Social Gatherings with Friends and Family: Not on file    Attends Spiritism Services: Not on file    Active Member of [address] [place] [address] or Organizations: Not on file    Attends Club or Organization Meetings: Not on file    Marital Status: Not on file   Intimate Partner Violence:     Fear of Current or Ex-Partner: Not on file    Emotionally Abused: Not on file    Physically Abused: Not on file    Sexually Abused: Not on file   Housing Stability:     Unable to Pay for Housing in the Last Year: Not on file    Number of [place] in the Last Year: Not on file    Unstable Housing in the Last Year: Not on file     No current facility-administered medications for this encounter. Current Outpatient Medications   Medication Sig Dispense Refill    oxyCODONE-acetaminophen (PERCOCET) 5-325 MG per tablet Take 1 tablet by mouth every 6 hours as needed for Pain for up to 3 days.  12 tablet 0    ranolazine (RANEXA) 500 MG extended release tablet Take 1 tablet by mouth 2 times daily 60 tablet 3    traZODone (DESYREL) 50 MG tablet Take 0.5 tablets by mouth nightly as needed for Sleep 7 tablet 0    mirtazapine (REMERON) 15 MG tablet Take 15 mg by mouth nightly      fludrocortisone (FLORINEF) 0.1 MG tablet Take 0.1 mg by mouth daily      dilTIAZem (CARDIZEM CD) 120 MG extended release capsule TAKE 1 CAPSULE BY MOUTH EVERY DAY 90 capsule 1    bisacodyl (DULCOLAX) 5 MG EC tablet Take 5 mg by mouth daily as needed for Constipation      metFORMIN (GLUCOPHAGE) 500 MG tablet Take 500 mg by mouth 2 times daily (with meals)      Compression Stockings MISC by Does not apply route 15 to 20 mmhg 1 each 0    aspirin 81 MG EC tablet Take 1 tablet by mouth every morning Over The Counter 90 tablet 1    donepezil (ARICEPT) 10 MG tablet Take 1 tablet by mouth nightly 90 tablet 1    atorvastatin (LIPITOR) 20 MG tablet Take 1 tablet by mouth daily 90 tablet 1    levothyroxine (SYNTHROID) 50 MCG tablet Take 1 tablet by mouth Daily 90 tablet 1    Cholecalciferol (VITAMIN D3) 5000 units TABS Take by mouth every morning Over The Counter       Allergies   Allergen Reactions    Norco [Hydrocodone-Acetaminophen] Other (See Comments)     Drops BP. Nursing Notes Reviewed    Physical Exam:  ED Triage Vitals [02/24/22 2306]   Enc Vitals Group      BP (!) 182/93      Pulse 68      Resp 18      Temp 97.9 °F (36.6 °C)      Temp Source Oral      SpO2 95 %      Weight 195 lb (88.5 kg)      Height 6' (1.829 m)      Head Circumference       Peak Flow       Pain Score       Pain Loc       Pain Edu? Excl. in [address]? General :Patient is awake alert oriented person place and time no acute distress nontoxic appearing  HEENT: Pupils are equally round and reactive to light extraocular motors are intact conjunctivae clear sclerae white there is no injection no icterus. Nose without any rhinorrhea or epistaxis. Oral mucosa is moist no exudate buccal mucosa shows no ulcerations. Uvula is midline    Neck: Neck is supple full range of motion trachea midline thyroid nonpalpable  Cardiac: Heart regular rate rhythm no murmurs rubs clicks or gallops  Lungs: Lungs are clear to auscultation there is no wheezing rhonchi or rales. There is no use of accessory muscles no nasal flaring identified. Chest wall: There is no tenderness to palpation over the chest wall or over ribs  Abdomen: Abdomen is soft nontender nondistended. There is no firm or pulsatile masses no rebound rigidity or guarding negative Branch's negative McBurney, no peritoneal signs  Suprapubic:  there is no tenderness to palpation over the external bladder   Musculoskeletal: 5 out of 5 strength in all 4 extremities full flexion extension abduction and adduction supination pronation of all extremities and all digits. No obvious muscle atrophy is noted. No focal muscle deficits are appreciated  Dermatology: Skin is warm and dry there is no obvious abscesses lacerations or lesions noted  Psych: Mentation is grossly normal cognition is grossly normal. Affect is appropriate  Neuro: Motor intact sensory intact cranial nerves II through XII are intact level of consciousness is normal cerebellar function is normal reflexes are grossly normal. No evidence of incontinence or loss of bowel or bladder no saddle anesthesia noted Lymphatic: There is no submandibular or cervical adenopathy appreciated.         I have reviewed and interpreted all of the currently available lab results from this visit (if applicable):  Results for orders placed or performed during the hospital encounter of 02/24/22   Respiratory Panel, Molecular, with COVID-19 (Restricted: peds pts or suitable admitted adults)    Specimen: Nasopharyngeal   Result Value Ref Range    Adenovirus Detection by PCR NOT DETECTED NOT DETECTED    Coronavirus 229E PCR NOT DETECTED NOT DETECTED    Coronavirus HKU1 PCR NOT DETECTED NOT DETECTED    Coronavirus NL63 PCR NOT DETECTED NOT DETECTED Coronavirus OC43 PCR NOT DETECTED NOT DETECTED    SARS-CoV-2 NOT DETECTED NOT DETECTED    Human Metapneumovirus PCR NOT DETECTED NOT DETECTED    Rhinovirus Enterovirus PCR NOT DETECTED NOT DETECTED    Influenza A by PCR NOT DETECTED NOT DETECTED    Influenza A H1 Pandemic PCR NOT DETECTED NOT DETECTED    Influenza A H1 (2009) PCR NOT DETECTED NOT DETECTED    Influenza A H3 PCR NOT DETECTED NOT DETECTED    Influenza B by PCR NOT DETECTED NOT DETECTED    Parainfluenza 1 PCR NOT DETECTED NOT DETECTED    Parainfluenza 2 PCR NOT DETECTED NOT DETECTED    Parainfluenza 3 PCR NOT DETECTED NOT DETECTED    Parainfluenza 4 PCR NOT DETECTED NOT DETECTED    RSV PCR NOT DETECTED NOT DETECTED    Bordetella parapertussis by PCR NOT DETECTED NOT DETECTED    B Pertussis by PCR NOT DETECTED NOT DETECTED    Chlamydophila Pneumonia PCR NOT DETECTED NOT DETECTED    Mycoplasma pneumo by PCR NOT DETECTED NOT DETECTED   CBC with Auto Differential   Result Value Ref Range    WBC 9.4 4.0 - 10.5 K/CU MM    RBC 3.96 (L) 4.6 - 6.2 M/CU MM    Hemoglobin 11.7 (L) 13.5 - 18.0 GM/DL    Hematocrit 37.9 (L) 42 - 52 %    MCV 95.7 78 - 100 FL    MCH 29.5 27 - 31 PG    MCHC 30.9 (L) 32.0 - 36.0 %    RDW 13.2 11.7 - 14.9 %    Platelets 535 835 - 834 K/CU MM    MPV 10.6 7.5 - 11.1 FL    Differential Type AUTOMATED DIFFERENTIAL     Segs Relative 56.1 36 - 66 %    Lymphocytes % 29.2 24 - 44 %    Monocytes % 6.9 (H) 0 - 4 %    Eosinophils % 6.2 (H) 0 - 3 %    Basophils % 1.4 (H) 0 - 1 %    Segs Absolute 5.3 K/CU MM    Lymphocytes Absolute 2.8 K/CU MM    Monocytes Absolute 0.7 K/CU MM    Eosinophils Absolute 0.6 K/CU MM    Basophils Absolute 0.1 K/CU MM    Nucleated RBC % 0.0 %    Total Nucleated RBC 0.0 K/CU MM    Total Immature Neutrophil 0.02 K/CU MM    Immature Neutrophil % 0.2 0 - 0.43 %   Comprehensive Metabolic Panel   Result Value Ref Range    Sodium 141 135 - 145 MMOL/L    Potassium 4.0 3.5 - 5.1 MMOL/L    Chloride 103 99 - 110 mMol/L    CO2 24 21 - 32 MMOL/L    BUN 20 6 - 23 MG/DL    CREATININE 1.0 0.9 - 1.3 MG/DL    Glucose 113 (H) 70 - 99 MG/DL    Calcium 9.5 8.3 - 10.6 MG/DL    Albumin 3.7 3.4 - 5.0 GM/DL    Total Protein 7.2 6.4 - 8.2 GM/DL    Total Bilirubin 0.4 0.0 - 1.0 MG/DL    ALT 19 10 - 40 U/L    AST 27 15 - 37 IU/L    Alkaline Phosphatase 75 40 - 128 IU/L    GFR Non-African American >60 >60 mL/min/1.73m2    GFR African American >60 >60 mL/min/1.73m2    Anion Gap 14 4 - 16   Troponin   Result Value Ref Range    Troponin T <0.010 <0.01 NG/ML   Lipase   Result Value Ref Range    Lipase 403 (H) 13 - 60 IU/L   Brain Natriuretic Peptide   Result Value Ref Range    Pro-.8 <300 PG/ML   Blood Gas, Venous   Result Value Ref Range    pH, Ray 7.38 7.32 - 7.42    pCO2, Ray 51 38 - 52 mmHG    pO2, Ray 68 (H) 28 - 48 mmHG    Base Exc, Mixed 3.9 (H) 0 - 1.2    HCO3, Venous 30.2 (H) 19 - 25 MMOL/L    O2 Sat, Ray 91.4 (H) 50 - 70 %   Urinalysis   Result Value Ref Range    Color, UA YELLOW YELLOW    Clarity, UA CLEAR CLEAR    Glucose, Urine NEGATIVE NEGATIVE MG/DL    Bilirubin Urine NEGATIVE NEGATIVE MG/DL    Ketones, Urine TRACE (A) NEGATIVE MG/DL    Specific Gravity, UA 1.023 1.001 - 1.035    Blood, Urine NEGATIVE NEGATIVE    pH, Urine 5.5 5.0 - 8.0    Protein, UA TRACE (A) NEGATIVE MG/DL    Urobilinogen, Urine 0.2 0.2 - 1.0 MG/DL    Nitrite Urine, Quantitative NEGATIVE NEGATIVE    Leukocyte Esterase, Urine NEGATIVE NEGATIVE    RBC, UA 1 0 - 3 /HPF    WBC, UA NONE SEEN 0 - 2 /HPF    Bacteria, UA NEGATIVE NEGATIVE /HPF    Mucus, UA RARE (A) NEGATIVE HPF   Troponin   Result Value Ref Range    Troponin T <0.010 <0.01 NG/ML   Troponin   Result Value Ref Range    Troponin T <0.010 <0.01 NG/ML   Lipase   Result Value Ref Range    Lipase 43 13 - 60 IU/L   TSH   Result Value Ref Range    TSH, High Sensitivity 1.930 0.270 - 4.20 uIu/ml   ANEMIA PANEL   Result Value Ref Range    WBC 8.8 4.0 - 10.5 K/CU MM    RBC 3.83 (L) 4.6 - 6.2 M/CU MM    Hemoglobin 11.4 (L) 13.5 - 18.0 GM/DL    Hematocrit 36.5 (L) 42 - 52 %    MCV 95.3 78 - 100 FL    MCH 29.8 27 - 31 PG    MCHC 31.2 (L) 32.0 - 36.0 %    RDW 13.3 11.7 - 14.9 %    Platelets 055 638 - 580 K/CU MM    MPV 10.7 7.5 - 11.1 FL    Differential Type AUTOMATED DIFFERENTIAL     Segs Relative 58.3 36 - 66 %    Lymphocytes % 28.2 24 - 44 %    Monocytes % 6.3 (H) 0 - 4 %    Eosinophils % 5.5 (H) 0 - 3 %    Basophils % 1.6 (H) 0 - 1 %    Segs Absolute 5.1 K/CU MM    Lymphocytes Absolute 2.5 K/CU MM    Monocytes Absolute 0.6 K/CU MM    Eosinophils Absolute 0.5 K/CU MM    Basophils Absolute 0.1 K/CU MM    Nucleated RBC % 0.0 %    Total Nucleated RBC 0.0 K/CU MM    Total Immature Neutrophil 0.01 K/CU MM    Immature Neutrophil % 0.1 0 - 0.43 %    Vitamin B-12 384.3 211 - 911 pg/ml    Folate 9.5 3.1 - 17.5 NG/ML    Iron 69 59 - 158 ug/dL    UIBC 185 110 - 370 ug/dL    TIBC 254 250 - 450 ug/dL    Transferrin % 27 10 - 44 %    Retic Ct Pct 1.9 0.2 - 2.20 %    Ferritin 209 30 - 400 NG/ML   Comprehensive Metabolic Panel   Result Value Ref Range    Sodium 138 135 - 145 MMOL/L    Potassium 4.0 3.5 - 5.1 MMOL/L    Chloride 101 99 - 110 mMol/L    CO2 25 21 - 32 MMOL/L    BUN 15 6 - 23 MG/DL    CREATININE 0.9 0.9 - 1.3 MG/DL    Glucose 151 (H) 70 - 99 MG/DL    Calcium 8.8 8.3 - 10.6 MG/DL    Albumin 3.4 3.4 - 5.0 GM/DL    Total Protein 6.2 (L) 6.4 - 8.2 GM/DL    Total Bilirubin 0.5 0.0 - 1.0 MG/DL    ALT 18 10 - 40 U/L    AST 28 15 - 37 IU/L    Alkaline Phosphatase 73 40 - 129 IU/L    GFR Non-African American >60 >60 mL/min/1.73m2    GFR African American >60 >60 mL/min/1.73m2    Anion Gap 12 4 - 16   POCT Glucose   Result Value Ref Range    POC Glucose 136 (H) 70 - 99 MG/DL   POCT Glucose   Result Value Ref Range    POC Glucose 105 (H) 70 - 99 MG/DL   EKG 12 Lead   Result Value Ref Range    Ventricular Rate 68 BPM    Atrial Rate 68 BPM    P-R Interval 88 ms    QRS Duration 94 ms    Q-T Interval 412 ms    QTc Calculation (Bazett) 438 ms    P Axis -28 degrees    R Axis -32 degrees    T Axis 73 degrees    Diagnosis       Sinus rhythm with short ID  Left axis deviation  Incomplete right bundle branch block  Moderate voltage criteria for LVH, may be normal variant  Abnormal ECG  When compared with ECG of 14-MAY-2021 19:57,  ID interval has decreased  Confirmed by Bharati Pan ([id number]) on 2/25/2022 10:42:18 AM        Radiographs (if obtained):  [] The following radiograph was interpreted by myself in the absence of a radiologist:   [] Radiologist's Report Reviewed:  NM MYOCARDIAL SPECT REST EXERCISE OR RX   Final Result      CT CHEST WO CONTRAST   Final Result   Chronic findings of left upper lobectomy with left hemithorax volume loss,   pulmonary fibrosis with honeycombing and traction bronchiectasis. No definite superimposed acute disease. XR CHEST PORTABLE   Final Result   Chronic interstitial changes are seen in the lungs, best appreciated on prior   CT imaging with known fibrosis, honeycombing and traction bronchiectasis. Changes appear stable on the right. Mild increased airspace disease in the left upper lobe could represent   progression of disease, or possibly related to acute superimposed atelectasis   or pneumonia. NM MYOCARDIAL SPECT REST EXERCISE OR RX    (Results Pending)       EKG (if obtained):   Please See Note of attending physician for EKG interpretation. Chart review shows recent radiograph(s):  No results found.     MDM:     Interventions given this visit:   Orders Placed This Encounter   Medications    aspirin chewable tablet 324 mg    DISCONTD: sodium chloride flush 0.9 % injection 5-40 mL    DISCONTD: sodium chloride flush 0.9 % injection 5-40 mL    DISCONTD: 0.9 % sodium chloride infusion    DISCONTD: ondansetron (ZOFRAN-ODT) disintegrating tablet 4 mg    DISCONTD: ondansetron (ZOFRAN) injection 4 mg    DISCONTD: acetaminophen (TYLENOL) tablet 650 mg    DISCONTD: acetaminophen (TYLENOL) suppository 650 mg    DISCONTD: polyethylene glycol (GLYCOLAX) packet 17 g    DISCONTD: aspirin chewable tablet 81 mg    DISCONTD: atorvastatin (LIPITOR) tablet 40 mg    DISCONTD: enoxaparin (LOVENOX) injection 40 mg    DISCONTD: dilTIAZem (CARDIZEM CD) extended release capsule 120 mg    DISCONTD: donepezil (ARICEPT) tablet 10 mg    DISCONTD: levothyroxine (SYNTHROID) tablet 50 mcg    DISCONTD: fludrocortisone (FLORINEF) tablet 0.1 mg    DISCONTD: mirtazapine (REMERON) tablet 15 mg    DISCONTD: glucose (GLUTOSE) 40 % oral gel 15 g    DISCONTD: dextrose 50 % IV solution    DISCONTD: glucagon (rDNA) injection 1 mg    DISCONTD: dextrose 5 % solution    DISCONTD: hydrALAZINE (APRESOLINE) 10 mg in sodium chloride 0.9 % 50 mL ivpb    DISCONTD: dextrose bolus (hypoglycemia) 10% 125 mL    DISCONTD: dextrose bolus (hypoglycemia) 10% 250 mL    DISCONTD: 0.9 % sodium chloride infusion    technetium sestamibi (CARDIOLITE) injection 10 millicurie    technetium sestamibi (CARDIOLITE) injection 30 millicurie    DISCONTD: amLODIPine (NORVASC) tablet 5 mg    DISCONTD: ranolazine (RANEXA) extended release tablet 500 mg    regadenoson (LEXISCAN) injection 0.4 mg    DISCONTD: losartan (COZAAR) tablet 25 mg    DISCONTD: 0.9 % sodium chloride bolus    DISCONTD: sodium chloride flush 0.9 % injection 5-40 mL    DISCONTD: sodium chloride flush 0.9 % injection 5-40 mL    DISCONTD: 0.9 % sodium chloride infusion    DISCONTD: acetaminophen (TYLENOL) tablet 650 mg    DISCONTD: hydrALAZINE (APRESOLINE) injection 10 mg    DISCONTD: hydrALAZINE (APRESOLINE) injection 10 mg    DISCONTD: 0.9 % sodium chloride infusion    DISCONTD: traZODone (DESYREL) tablet 25 mg    DISCONTD: oxyCODONE-acetaminophen (PERCOCET) 5-325 MG per tablet 1 tablet    oxyCODONE-acetaminophen (PERCOCET) 5-325 MG per tablet     Sig: Take 1 tablet by mouth every 6 hours as needed for Pain for up to 3 days.      Dispense:  12 tablet     Refill:  0     Reduce doses taken as pain becomes manageable    ranolazine (RANEXA) 500 MG extended release tablet     Sig: Take 1 tablet by mouth 2 times daily     Dispense:  60 tablet     Refill:  3    traZODone (DESYREL) 50 MG tablet     Sig: Take 0.5 tablets by mouth nightly as needed for Sleep     Dispense:  7 tablet     Refill:  0       Patient presents with chest pain. Patient has a heart score >3. Has continued pain here. Initial cardiac workup is negative including negative troponin, cxr (per radiologist interpretation) ekg (per attending physician interpretation) will require admission for chest pain rule out. Blood count and metabolic panel reveal no acuity to account for pt symptoms. On-call physician  Was consulted regarding patient. After thorough discussion regarding patient's history, physical exam.  laboratory values, radiographic evidence (if applicable  theymyself as well as my attending physician agreed Given the patient's presenting concerns, medical history and clinical findings, the patient will be admitted at this time to undergo further evaluation and disposition. . During patient's entire stay in the ED patient remained stable and comfortable. Analgesia is well-controlled. Patient will be admitted for all information regarding ongoing management and care of patient please see note of Admitting physician. Lipase elevated have low suspicion clinically of acute pancreatitis    All EKG interpretations are performed by Attending physician       I independently managed patient today in the ED      BP (!) 159/84   Pulse 59   Temp 98 °F (36.7 °C) (Oral)   Resp 18   Ht 6' (1.829 m)   Wt 195 lb (88.5 kg)   SpO2 95%   BMI 26.45 kg/m²       Clinical Impression:  1. Other chest pain    2. Elevated lipase    3.  Chronic pain syndrome        Disposition referral (if applicable):  ASHLEE Blackwell - CNP  [address] [facility]  [phone]    Schedule an appointment as soon as possible for a visit in 2 weeks  For follow up    [facility]     Schedule an appointment as soon as possible for a visit  For follow up    Disposition medications (if applicable):  Discharge Medication List as of 2/26/2022 12:52 PM      START taking these medications    Details   oxyCODONE-acetaminophen (PERCOCET) 5-325 MG per tablet Take 1 tablet by mouth every 6 hours as needed for Pain for up to 3 days. , Disp-12 tablet, R-0Normal      traZODone (DESYREL) 50 MG tablet Take 0.5 tablets by mouth nightly as needed for Sleep, Disp-7 tablet, R-0Normal               Comment: Please note this report has been produced using speech recognition software and may contain errors related to that system including errors in grammar, punctuation, and spelling, as well as words and phrases that may be inappropriate. If there are any questions or concerns please feel free to contact the dictating provider for clarification.       Frank Harris, [address] [place] [address]  03/01/22 3590

## 2022-02-25 NOTE — ED NOTES
XR CHEST PORTABLE    Status: Final result       Order Providers    Authorizing Billing   JAYSON Naqvi MD            Signed by    Signed Date/Time Phone Pager   Kristina Ledesma 2/25/2022 12:09 [phone]      Reading Providers    Read Date Phone Pager   Junior Aragons RILEY Fri Feb 25, 2022 12:09 [phone]        XR CHEST PORTABLE: Patient Communication     Released  Not seen     Radiation Dose Estimates    No radiation information found for this patient  Narrative   EXAMINATION:   ONE XRAY VIEW OF THE CHEST       2/24/2022 11:38 pm       COMPARISON:   05/14/2021, CT chest on 10/10/2019       HISTORY:   ORDERING SYSTEM PROVIDED HISTORY: chest pain   TECHNOLOGIST PROVIDED HISTORY:   Reason for exam:->chest pain   Reason for Exam: chest pain   Additional signs and symptoms: chest pain   Relevant Medical/Surgical History: chest pain       FINDINGS:   Cardiomegaly.  Progression of parenchymal airspace disease within the left   upper lobe, with similar appearing chronic interstitial lung disease noted on   the right.  No acute osseous abnormality is identified.  No pneumothorax.           Impression   Chronic interstitial changes are seen in the lungs, best appreciated on prior   CT imaging with known fibrosis, honeycombing and traction bronchiectasis.       Changes appear stable on the right.       Mild increased airspace disease in the left upper lobe could represent   progression of disease, or possibly related to acute superimposed atelectasis   or pneumonia.           Tom Luna RN  02/25/22 8840

## 2022-02-25 NOTE — CONSULTS
[address]  Phone: [phone]    Fax [phone]                  Lasha Bates MD, Maura Norman MD, [address], MD, MD Jorge Rodriguez MD Aubry Scull, MD Anastasio Bowman, MD Ariane Jones, ASHLEE Bess, ASHLEE Colon, ASHLEE De Los Santos, ASHLEE Fountain PA-C    CARDIOLOGY CONSULT NOTE     Reason for consultation: Chest pain    Referring physician:  Aspen Helton MD     Primary care physician: Jake Prakash, ASHLEE - CNP      Thank you for the consult. Chief Complaints :  Chief Complaint   Patient presents with    Chest Pain    Fatigue    Fever    Chills        History of present illness:Jefferson is a [de-identified] y. o.year old  male with a past medical history of coronary artery disease, hypothyroidism, hyperlipidemia, hypertension, pulmonary fibrosis, mitral valve prolapse, and type 2 diabetes mellitus. Patient presents with chest pain, fatigue, and increased shortness of breath. Patient's daughter and wife are in the room and helped obtain history due to patient being sluggish. Family stated that yesterday that the patient began experiencing denied 10 chest pain around 10:30 PM. They had also noticed that he has been having increased fatigue and shortness of breath over the last few days. Patient does have chronic shortness of breath secondary to pulmonary fibrosis but was getting worse. Patient did not notice anything to aggravate or alleviate his symptoms. The pain began while laying in bed. Patient did take Aleve at home and received aspirin in the emergency department. Patient follows with Dr. Martinez Oneill. Patient underwent PCI of LAD in April 2019 and is cath in November 2020 revealed patent LAD and no other artery needed intervened on.     Troponin negative x2, EKG with new incomplete left bundle branch block, CXR revealing chronic lung disease with possible superimposed atelectasis or pneumonia, WBC of 9.4. Patient is not currently having chest pain, palpitations, diaphoresis, nausea, vomiting, lower extremity edema, or worsening shortness of breath. Past medical history:    has a past medical history of Arthritis, Asthma, Back pain, CAD (coronary artery disease), cardiovascular stress test, Diabetes mellitus ([facility] [address] echocardiogram, Glaucoma, H/O 24 hour EKG monitoring, H/O cardiac catheterization, H/O cardiovascular stress test, H/O chest pain, H/O chest x-ray, H/O dizziness, H/O Doppler ultrasound, H/O echocardiogram, H/O gastroesophageal reflux (GERD), H/O pneumothorax, History of cardiac monitoring, History of complete ECG, History of exercise stress test, History of stress test, Craig (hard of hearing), Hx of CT scan of chest, Hx of Doppler echocardiogram, Hyperlipidemia, Hypertension, Left Lung Cancer, Lung nodule, Memory loss, MVP (mitral valve prolapse), Old MI (myocardial infarction), Shortness of breath on exertion, Teeth missing, Thyroid disease, Wears dentures, Wears glasses, and Wears hearing aid. Past surgical history:   has a past surgical history that includes bronchoscopy (N/A, 12/7/2018); eye surgery (Bilateral, 2000's); Dental surgery; knee surgery (Left, 1980's Or 1990's); Knee arthroscopy (Bilateral, 1970's To 1990's); Vasectomy (1980's); Cardiac catheterization (1980's Or 1990's); Colonoscopy (Last Done In 2006); Cholecystectomy, laparoscopic (2002); Lung removal, total (Left, 1/28/2019); and Colonoscopy (N/A, 4/9/2019). Social History:   reports that he has never smoked. He has never used smokeless tobacco. He reports that he does not drink alcohol and does not use drugs. Family history:  Father with history of coronary artery disease and stroke.     Allergies   Allergen Reactions    Norco [Hydrocodone-Acetaminophen] Other (See Comments)     Drops BP.        sodium chloride flush 0.9 % injection 5-40 mL, 2 times per day  sodium chloride flush 0.9 % injection 5-40 mL, PRN  0.9 % sodium chloride infusion, PRN  ondansetron (ZOFRAN-ODT) disintegrating tablet 4 mg, Q8H PRN   Or  ondansetron (ZOFRAN) injection 4 mg, Q6H PRN  acetaminophen (TYLENOL) tablet 650 mg, Q6H PRN   Or  acetaminophen (TYLENOL) suppository 650 mg, Q6H PRN  polyethylene glycol (GLYCOLAX) packet 17 g, Daily PRN  [START ON 2/26/2022] aspirin chewable tablet 81 mg, Daily  atorvastatin (LIPITOR) tablet 40 mg, Nightly  enoxaparin (LOVENOX) injection 40 mg, Daily  dilTIAZem (CARDIZEM CD) extended release capsule 120 mg, Daily  donepezil (ARICEPT) tablet 10 mg, Nightly  levothyroxine (SYNTHROID) tablet 50 mcg, Daily  fludrocortisone (FLORINEF) tablet 0.1 mg, Daily  mirtazapine (REMERON) tablet 15 mg, Nightly  glucose (GLUTOSE) 40 % oral gel 15 g, PRN  glucagon (rDNA) injection 1 mg, PRN  dextrose 5 % solution, PRN  hydrALAZINE (APRESOLINE) 10 mg in sodium chloride 0.9 % 50 mL ivpb, Q6H PRN  dextrose bolus (hypoglycemia) 10% 125 mL, PRN   Or  dextrose bolus (hypoglycemia) 10% 250 mL, PRN  0.9 % sodium chloride infusion, Continuous  technetium sestamibi (CARDIOLITE) injection 10 millicurie, ONCE PRN  technetium sestamibi (CARDIOLITE) injection 30 millicurie, ONCE PRN      Current Facility-Administered Medications   Medication Dose Route Frequency Provider Last Rate Last Admin    sodium chloride flush 0.9 % injection 5-40 mL  5-40 mL IntraVENous 2 times per day Maik Rios MD        sodium chloride flush 0.9 % injection 5-40 mL  5-40 mL IntraVENous PRN Maki Rios MD        0.9 % sodium chloride infusion  25 mL IntraVENous PRN Maik Rios MD        ondansetron (ZOFRAN-ODT) disintegrating tablet 4 mg  4 mg Oral Q8H PRN Maik Rios MD        Or    ondansetron (ZOFRAN) injection 4 mg  4 mg IntraVENous Q6H PRN Maik Rios MD        acetaminophen (TYLENOL) tablet 650 mg  650 mg Oral Q6H PRN Maik Rios MD        Or   Vane acetaminophen (TYLENOL) suppository 650 mg  650 mg Rectal Q6H PRN Sheryle Shone, MD        polyethylene glycol (GLYCOLAX) packet 17 g  17 g Oral Daily PRN Sheryle Shone, MD       Judeen Marc Corrinne Lies ON 2/26/2022] aspirin chewable tablet 81 mg  81 mg Oral Daily Sheryle Shone, MD        atorvastatin (LIPITOR) tablet 40 mg  40 mg Oral Nightly Sheryle Shone, MD        enoxaparin (LOVENOX) injection 40 mg  40 mg SubCUTAneous Daily Sheryle Shone, MD        dilTIAZem (CARDIZEM CD) extended release capsule 120 mg  120 mg Oral Daily Sheryle Shone, MD        donepezil (ARICEPT) tablet 10 mg  10 mg Oral Nightly Sheryle Shone, MD        levothyroxine (SYNTHROID) tablet 50 mcg  50 mcg Oral Daily Sheryle Shone, MD   50 mcg at 02/25/22 0606    fludrocortisone (FLORINEF) tablet 0.1 mg  0.1 mg Oral Daily Sheryle Shone, MD        mirtazapine (REMERON) tablet 15 mg  15 mg Oral Nightly Sheryle Shone, MD        glucose (GLUTOSE) 40 % oral gel 15 g  15 g Oral PRN Sheryle Shone, MD        glucagon (rDNA) injection 1 mg  1 mg IntraMUSCular PRN Sheryle Shone, MD        dextrose 5 % solution  100 mL/hr IntraVENous PRN Sheryle Shone, MD        hydrALAZINE (APRESOLINE) 10 mg in sodium chloride 0.9 % 50 mL ivpb  10 mg IntraVENous Q6H PRN Sheryle Shone, MD        dextrose bolus (hypoglycemia) 10% 125 mL  125 mL IntraVENous PRN Sheryle Shone, MD        Or    dextrose bolus (hypoglycemia) 10% 250 mL  250 mL IntraVENous PRN Sheryle Shone, MD        0.9 % sodium chloride infusion   IntraVENous Continuous Sheryle Shone,  mL/hr at 02/25/22 0602 New Bag at 02/25/22 0602    technetium sestamibi (CARDIOLITE) injection 10 millicurie  10 millicurie IntraVENous ONCE PRN ASHLEE Lama - CNP        technetium sestamibi (CARDIOLITE) injection 30 millicurie  30 millicurie IntraVENous ONCE PRN Brett Gonzalez APRN - CNP           Review of Systems   Constitutional: Positive for fatigue. Negative for diaphoresis, fever and unexpected weight change. HENT: Negative. Eyes: Negative for visual disturbance. Respiratory: Positive for shortness of breath. Negative for cough and chest tightness. Cardiovascular: Positive for chest pain. Negative for palpitations and leg swelling. Gastrointestinal: Negative for abdominal pain, diarrhea and vomiting. Endocrine: Negative for cold intolerance and heat intolerance. Musculoskeletal: Negative. Skin: Negative for pallor and rash. Neurological: Negative for dizziness, syncope, light-headedness and headaches. Hematological: Does not bruise/bleed easily. Psychiatric/Behavioral: Negative for dysphoric mood. The patient is not nervous/anxious. Physical Examination:    Vitals:    02/25/22 0430 02/25/22 0512 02/25/22 0533 02/25/22 0538   BP: (!) 181/93 (!) 175/88  (!) 178/92   Pulse: 58 59 60 63   Resp:       Temp:  97.7 °F (36.5 °C)     TempSrc:       SpO2: 97% 93%     Weight:       Height:           Physical Exam  Constitutional:       General: He is not in acute distress. Appearance: He is not diaphoretic. Comments: Sluggish   HENT:      Head: Normocephalic and atraumatic. Right Ear: External ear normal.      Left Ear: External ear normal.      Nose: Nose normal.      Mouth/Throat:      Mouth: Mucous membranes are moist.   Eyes:      Extraocular Movements: Extraocular movements intact. Comments: Pupils equal and round   Neck:      Vascular: No carotid bruit. Cardiovascular:      Rate and Rhythm: Normal rate and regular rhythm. Pulses: Normal pulses. Heart sounds: S1 normal and S2 normal. No murmur heard. No friction rub. No gallop. Pulmonary:      Effort: No respiratory distress. Comments: On oxygen  Chest:      Chest wall: No tenderness. Abdominal:      Palpations: Abdomen is soft. Tenderness: There is no abdominal tenderness. Musculoskeletal:      Right lower leg: No edema. Left lower leg: No edema. Skin:     General: Skin is warm and dry. Capillary Refill: Capillary refill takes less than 2 seconds. Findings: No rash. Comments: Skin turgor brisk   Neurological:      Mental Status: He is alert and oriented to person, place, and time. Mental status is at baseline. Psychiatric:         Mood and Affect: Mood normal.         Behavior: Behavior is cooperative. Thought Content: Thought content normal.         Judgment: Judgment normal.          Medical decision making and Data review:    Lab Review   Recent Labs     02/25/22 0035   WBC 9.4   HGB 11.7*   HCT 37.9*         Recent Labs     02/25/22 0035      K 4.0      CO2 24   BUN 20   CREATININE 1.0     Recent Labs     02/25/22 0035   AST 27   ALT 19   BILITOT 0.4   ALKPHOS 75     Recent Labs     02/25/22 0035 02/25/22  0804   TROPONINT <0.010 <0.010       Recent Labs     02/25/22 0035   PROBNP 255.8     Lab Results   Component Value Date    INR 0.93 11/04/2020    PROTIME 11.3 (L) 11/04/2020       EKG: Reviewed by me  NSR with incomplete left bundle branch block    ECHO: 11/3/2020   Limited study due to patients body habitus lung surgery. Left ventricular function is low normal, EF is estimated at 50-55%. Mild left ventricular hypertrophy. Normal diastolic filling pattern for age. Mildly dilated left atrium. Mild tricuspid regurgitation; RVSP is 32 mmHg. No significant valvular disease noted. No evidence of pericardial effusion. Chest Xray: Reviewed by me  Chronic interstitial changes are seen in the lungs, best appreciated on prior CT imaging with known fibrosis, honeycombing and traction bronchiectasis. Changes appear stable on the right. Mild increased airspace disease in the left upper lobe could represent progression of disease, or possibly related to acute superimposed atelectasis or pneumonia.      Stress test: 10/15/2020   Supervising physician Dr. Radha Matamoros portion of stress test is negative for ischemia by diagnostic criteria.   Possible artifact or atrial rhythm post infusion , resolved within a minute   Severe anterior medium to large area of ischemia   Abnormal stress test   ECG portion of stress test is negative for ischemia by diagnostic criteria.   Possible afib or atrial flutter artifact post infusion , resolved within a   minute        [facility]: 11/6/2020   Procedure Summary   Access : Radial and femoral Vein   1. LAD stent is patent , ostial LAD has 20-30 % stenosis,   2. Circ and RCA are patent   3. LVEDP was 16 mmHG   4. PA was 35/7 mean 15 mmHG ,Pcwp was 11 mmHG   5. CO is 3.9 L/MIN   6. RV was 40/3 mmHG and RA was 7 mmHG      Recommendations   Risk factor modification   Normal right sided pressures   Doubt cardiac etiology of SOB   add cardizem and stop ranexa for possible Multi focal   tachycardis seen on stress test and event monitor    CT CHEST WO CONTRAST: 2/25/2022  Chronic findings of left upper lobectomy with left hemithorax volume loss, pulmonary fibrosis with honeycombing and traction bronchiectasis. No definite superimposed acute disease. All labs, medications and tests reviewed by myself including data  from outside source , patient and available family . Continue all other medications of all above medical condition listed as is. Impression:  Principal Problem:    Chest pain  Resolved Problems:    * No resolved hospital problems. *      Assessment and plan: [de-identified] y. o.year old with   1. Chest pain with history of CAD  -Obtain stress test and echo. Continue aspirin and Lipitor. Not currently on beta-blocker  2. Pulmonary fibrosis  -On home oxygen therapy at 3.5 L/min. Possibly contributory to chest pain  3. Possible multifocal atrial tachycardia  -Continue Cardizem  4. Orthostatic hypotension  -On fludrocortisone. Compression stockings    Plan and Recommendations:       Thank you  much for consult and giving us the opportunity in contributing in the care of this patient. Please feel free to call me for any questions. Loc Oneil PA-C, 2/25/2022 9:56 AM           CARDIOLOGY ATTENDING ADDENDUM    I have seen, spoken to and examined this patient personally, independent of the NP/PAC. I have reviewed the hospital care given to date and reviewed all pertinent labs and imaging. I have spoken with patient, nursing staff and provided written and verbal instructions . The above note has been reviewed. I have spent substantive amount of time in formulating patient care.         Please Refer to separate consultation note       Dr. Betty Mi MD

## 2022-02-25 NOTE — ED NOTES
Dr. Kimberly Steele at bedside discussing plan of care and admission with pt and wife at this time.       Jonathan Davenport RN  02/25/22 1686

## 2022-02-25 NOTE — ED NOTES
Bed: ED-14  Expected date:   Expected time:   Means of arrival:   Comments:  [address] Tiffany, RN  02/24/22 2285

## 2022-02-25 NOTE — ED NOTES
PA [address] [facility] at bedside to discuss plan of care with pt and wife      Arneta Snellen, GENA  02/25/22 1149

## 2022-02-25 NOTE — PROGRESS NOTES
Abnormal stress test will plan cath    Alternates and risk of the procedure were dicussed in detail  Patient is in agreement to proceed  Mallampati is 2  ASA is  3

## 2022-02-25 NOTE — H&P
History and Physical      Name:  Ying Cohen /Age/Sex: 1940  ([de-identified] y.o. male)   MRN & CSN:  [id number] & [id number] Encounter Date/Time: 2022 5:15 AM EST   Location:  [address] [place] [address] PCP: Sabina Mar 112 Day: 2    Assessment and Plan:     #. Chest pain: Evaluate for acute coronary syndrome  -Troponin x1-negative, EKG with no acute ST-T wave changes. -Repeat troponin, repeat EKG  -Consult cardiology  -Cath-2020-patent stents    #. Elevated lipase-patient asymptomatic  -Patient denied any epigastric pain, denied any nausea, vomiting. #. Coronary artery disease  -LAD stent-2019  -Continue atorvastatin, aspirin    #. Palpitations/tachycardia  -On Cardizem for possible multifocal atrial tachycardia as per Dr. Sandy Almonte documentation. #. Orthostatic hypotension  -Patient is on fludrocortisone  -Compression stockings    #. Diabetes mellitus type 2, not on long-term insulin  -Patient is on Metformin  -Continue insulin sliding scale with hypoglycemia protocol. #. Hypothyroidism: Levothyroxine    #. Pulmonary fibrosis/COPD  -Chest x-ray-chronic interstitial changes seen in lungs, mild increased airspace disease in the left upper lobe could represent progression of the disease, or possibly related to acute superimposed atelectasis or pneumonia. -CT chest without contrast-chronic findings of the left upper lobe lobectomy with left hemithorax volume loss, pulmonary fibrosis with honeycombing and traction bronchiectasis. #. History of lung mass-s/p left upper lobectomy-2019    #. Chronic respiratory failure on home oxygen at 3.5 L/min    #. Depression/anxiety-mirtazapine    #. Dementia: On donepezil    Disposition:   Current Living situation: Home, wife is caregiver    Diet Diet NPO   DVT Prophylaxis [x] Lovenox, []  Heparin, [] SCDs, [] Ambulation,  [] Eliquis, [] Xarelto   Code Status Limited. Discussed with wife at bedside.    Surrogate Decision Maker/ POA History from:   EMR, patient, wife at bed side. History of Present Illness:     Chief Complaint: Chest pain  Don Gaviria is a [de-identified] y.o. male with end-stage COPD, pulmonary fibrosis, chronic respiratory failure on home oxygen, coronary artery disease, orthostatic hypotension, diabetes mellitus type 2, not on long-term insulin, hypothyroidism, hyperlipidemia, depression/anxiety, dementia was brought to ED for chest pain. Patient reported having chest pain last night. He describes a substernal, sharp, felt as if the heart was going to explode and thought he was having a heart attack (patient's own words), 10/10 intensity, denied any radiation, denied any aggravating or relieving factors. Patient denied any nausea, vomiting, has chronic shortness of breath, denied any diaphoresis. Patient has been having trouble sleeping for the last 3-4 days and took Advil PM as per wife at bedside. Patient gets short of breath with minimal activity. Has lost weight as per wife at bedside. At presentation patient was noted to have /93, HR 68, RR 18, temp 97.9, saturating 95% on room air. Lab work significant for random glucose 113, troponin<0.010, hemoglobin 11.7, lipase 403. Respiratory viral panel negative, UA-not suggestive of infection. VBG-pH 7.38, PCO2 51, PO2 68, HCO3 30.2. Chest x-ray-chronic interstitial changes, mild increased airspace disease in the left upper lobe. CT chest without contrast-chronic findings of the left upper lobe lobectomy with left hemithorax volume loss, pulmonary fibrosis with honeycombing and traction bronchiectasis. Patient received aspirin in ER. Review of Systems: Need 10 Elements   10 point review of systems conducted and pertinent positives and negatives as per HPI.     Objective:   No intake or output data in the 24 hours ending 02/25/22 0515   Vitals:   Vitals:    02/25/22 0000 02/25/22 0030 02/25/22 0430 02/25/22 0512   BP: (!) 176/97 (!) 172/97 (!) 181/93 (!) 175/88 Pulse: 68 67 58 59   Resp:       Temp:    97.7 °F (36.5 °C)   TempSrc:       SpO2: 96% 95% 97% 93%   Weight:       Height:           Medications Prior to Admission   Reviewed medications with patient's wife  Is on fludrocortisone 0.1 mg daily, mirtazapine 15 mg nightly, atorvastatin 20 mg every afternoon, donepezil 10 mg at bedtime, levothyroxine 50 MCG daily, Metformin 5 mg twice daily, Cardizem 120 mg daily. Prior to Admission medications    Medication Sig Start Date End Date Taking? Authorizing Provider   mirtazapine (REMERON) 15 MG tablet Take 15 mg by mouth nightly   Yes Historical Provider, MD   fludrocortisone (FLORINEF) 0.1 MG tablet Take 0.1 mg by mouth daily   Yes Historical Provider, MD   dilTIAZem (CARDIZEM CD) 120 MG extended release capsule TAKE 1 CAPSULE BY MOUTH EVERY DAY 11/29/21   Zelma Bernheim, MD   bisacodyl (DULCOLAX) 5 MG EC tablet Take 5 mg by mouth daily as needed for Constipation    Historical Provider, MD   metFORMIN (GLUCOPHAGE) 500 MG tablet Take 500 mg by mouth 2 times daily (with meals)    Historical Provider, MD   Compression Stockings MISC by Does not apply route 15 to 20 mmhg 6/1/21   Zelma Bernheim, MD   aspirin 81 MG EC tablet Take 1 tablet by mouth every morning Over The Counter 4/25/19   Lucinda Bamberger, MD   donepezil (ARICEPT) 10 MG tablet Take 1 tablet by mouth nightly 3/29/19   Kj Fiore MD   atorvastatin (LIPITOR) 20 MG tablet Take 1 tablet by mouth daily 3/29/19   Kj Fiore MD   levothyroxine (SYNTHROID) 50 MCG tablet Take 1 tablet by mouth Daily 3/26/19   Kj Fiore MD   Cholecalciferol (VITAMIN D3) 5000 units TABS Take by mouth every morning Over The Counter    Historical Provider, MD       Physical Exam: Need 8 Elements   Physical Exam     GEN  -Awake, alert, ill-appearing, hard of hearing. EYES   -PERRL. HENT  -MM are dry. RESP  -LS CTA equal bilat, no wheezes, rales or rhonchi. Symmetric chest movement. No respiratory distress noted.   C/V -S1/S2 auscultated. RRR without appreciable M/R/G. No peripheral edema. No reproducible chest wall tenderness. GI  -Abdomen is soft, non-distended, no significant tenderness. No masses or guarding. + BS in all quadrants. Rectal exam deferred.   -No CVA tenderness. Garay catheter is not present. MS  -B/L extremities - No gross joint deformities. No swelling, intact sensation symmetrical.   SKIN  -Normal coloration, warm, dry. NEURO  - Awake, alert, oriented x 3, no focal deficits. PSYC  - Appropriate affect. Past Medical History:   Reviewed patient's past medical, surgical, social, family history and allergies. PMHx   Past Medical History:   Diagnosis Date    Arthritis     \"Back, Knees\"    Asthma     Back pain     \"At Times\"    CAD (coronary artery disease)     cardiovascular stress test 10/15/2020    possible afib or atrial flutter post infusion severe anterior medium to large ischemia     Diabetes mellitus ([address]     dx 5+ yrs ago-     echocardiogram 11/03/2020    EF 50-55% mild tricuspid regurg no significant valvular disease noted.  Glaucoma     Bilateral Eyes    H/O 24 hour EKG monitoring 11-    Predominantly SR - avg rate of 69bpm. Lowest rate 46 bpm at 0622. Frequent isolated 5954 PVCs noted mostly in bigemenal pattern w/out complex arrhythmias. Five beat run of atrial tachycardia at 1228. No palpitations or dizziness reported in patient's daily activity report. (12-, )    H/O cardiac catheterization 06-    Noncritical diffuse CAD w/no critical stenosis. Diag borderline significant stenosis, not large enough fo percutaneous intervention. No significant angiographic progression of atherosclerosis since cath in 1997.   ( per Dr. Toya Viera at [facility]. Preserved vent function. MVP.  CAD w/normal LM, 30-40% stenosis LAD, 70-80% stenosis of ostium & prox seg diag branch, normal left CX & RCA.)    H/O cardiovascular stress test 1/30/2013, 06- 1/30/2013-Normal study. Normal perfusion in all regions. EF 62%. 06--EF=60%. Normal. Normal pattern of perfusion. LV normal size. No wall abnormality. Exercise capacity good. (05-, 12-, 06-, , )    H/O chest pain     H/O chest x-ray 06-    Negative. Dx was dyspnea and CAD.  (02-)    H/O dizziness     H/O Doppler ultrasound 11-    (Carotid) Intimal thickening but no significant atherosclerotic plaque noted in right or left ICA. Doppler flow velocities w/in right and left ICA WNL.  H/O echocardiogram 1/30/2013, 05-    1/30/2013- LVSF normal. EF 50-55%. Impaired LV relaxation. Trace MR. Trace TR;   5- Normal LV size and systolic function. EF=60%. Chamber dimensions WNL. Mild concentric LV hypertrophy. Valves appear structurally normal.-OICC       H/O gastroesophageal reflux (GERD)     H/O pneumothorax Dx 1960    \"Both Sides\"    History of cardiac monitoring 12/03/2018    9 beat run of SVT, no other arrhythmias noted, primarily sinus rythym    History of complete ECG 06/06/2011    (06-, 07-, 06-, 06-)    History of exercise stress test 05/21/2019    Treadmill, Stopped due to fatigue and  Dyspnea.  History of stress test 11/07/2018    EF 61%, Normal    [place] (hard of hearing)     Bilateral Hearing Aids    Hx of CT scan of chest 11/12/2018    3 mm indeterminate solid nonobstructing endobronchial nodule of the proximal left upper lobe bronchus. Further evaluation with endoscopy is recommended. Bilateral basilar predominant intersitial changes suggestive of nonspecific interstitial pneumonitis.     Hx of Doppler echocardiogram 11/20/2018    EF55-60%,no valvular disease    Hyperlipidemia     Hypertension     Left Lung Cancer Dx 11-18    LEFT UPPER LOBECTOMY 1/28/19    Lung nodule     \"they say I have a spot on my lung- and mass in left lung so thats why doing the bronch\"( 12/7/2018)    Memory loss  MVP (mitral valve prolapse)     follows with Dr Yoel Padilla (myocardial infarction)     \"had heart attack 30 yrs ago a mild one\"    Shortness of breath on exertion     Teeth missing     Upper And Lower    Thyroid disease     Wears dentures     Full Upper    Wears glasses     Wears hearing aid     Bilateral Ears     PSHX:  has a past surgical history that includes bronchoscopy (N/A, 12/7/2018); eye surgery (Bilateral, 2000's); Dental surgery; knee surgery (Left, 1980's Or 1990's); Knee arthroscopy (Bilateral, 1970's To 1990's); Vasectomy (1980's); Cardiac catheterization (1980's Or 1990's); Colonoscopy (Last Done In 2006); Cholecystectomy, laparoscopic (2002); Lung removal, total (Left, 1/28/2019); and Colonoscopy (N/A, 4/9/2019). Allergies: Allergies   Allergen Reactions    Norco [Hydrocodone-Acetaminophen] Other (See Comments)     Drops BP. Fam HX: family history includes COPD in his sister; Diabetes in his brother; Heart Attack in his father; Heart Disease in his mother; High Blood Pressure in his mother; Stroke in his father.   Soc HX:   Social History     Socioeconomic History    Marital status:      Spouse name: None    Number of children: 3    Years of education: None    Highest education level: None   Occupational History    Occupation: Retired     Comment: [employer] - worked on the [employer]   Tobacco Use    Smoking status: Never Smoker    Smokeless tobacco: Never Used   Vaping Use    Vaping Use: Never used   Substance and Sexual Activity    Alcohol use: Never    Drug use: No    Sexual activity: Not Currently     Partners: Female   Other Topics Concern    None   Social History Narrative    None     Social Determinants of Health     Financial Resource Strain:     Difficulty of Paying Living Expenses: Not on file   Food Insecurity:     Worried About Running Out of Food in the Last Year: Not on file    Marcos of Food in the Last Year: Not on file   Transportation Needs: Recent Labs     02/25/22 0035   WBC 9.4   HGB 11.7*        BMP:    Recent Labs     02/25/22 0035      K 4.0      CO2 24   BUN 20   CREATININE 1.0   GLUCOSE 113*     Hepatic:   Recent Labs     02/25/22 0035   AST 27   ALT 19   BILITOT 0.4   ALKPHOS 75     Lipids:   Lab Results   Component Value Date    CHOL 117 11/02/2018    HDL 43 11/02/2018    TRIG 178 11/02/2018     Hemoglobin A1C:   Lab Results   Component Value Date    LABA1C 8.8 05/17/2021     TSH:   Lab Results   Component Value Date    TSH 2.370 11/02/2018     Troponin:   Lab Results   Component Value Date    TROPONINT <0.010 02/25/2022    TROPONINT <0.010 05/15/2021    TROPONINT <0.010 05/14/2021     Lactic Acid: No results for input(s): LACTA in the last 72 hours. BNP:   Recent Labs     02/25/22 0035   PROBNP 255.8     UA:  Lab Results   Component Value Date    NITRU NEGATIVE 02/25/2022    COLORU YELLOW 02/25/2022    WBCUA NONE SEEN 02/25/2022    RBCUA 1 02/25/2022    MUCUS RARE 02/25/2022    TRICHOMONAS NONE SEEN 05/31/2020    BACTERIA NEGATIVE 02/25/2022    CLARITYU CLEAR 02/25/2022    SPECGRAV 1.023 02/25/2022    LEUKOCYTESUR NEGATIVE 02/25/2022    UROBILINOGEN 0.2 02/25/2022    BILIRUBINUR NEGATIVE 02/25/2022    BLOODU NEGATIVE 02/25/2022    KETUA TRACE 02/25/2022     Urine Cultures: No results found for: Andres Jana  Blood Cultures: No results found for: BC  No results found for: BLOODCULT2  Organism: No results found for: ORG    Imaging/Diagnostics Last 24 Hours   CT CHEST WO CONTRAST    Result Date: 2/25/2022  EXAMINATION: CT OF THE CHEST WITHOUT CONTRAST 2/25/2022 12:51 am TECHNIQUE: CT of the chest was performed without the administration of intravenous contrast. Multiplanar reformatted images are provided for review. Dose modulation, iterative reconstruction, and/or weight based adjustment of the mA/kV was utilized to reduce the radiation dose to as low as reasonably achievable.  COMPARISON: 10/10/2019 HISTORY: [address] PROVIDED HISTORY: weakness TECHNOLOGIST PROVIDED HISTORY: Reason for exam:->weakness Decision Support Exception - unselect if not a suspected or confirmed emergency medical condition->Emergency Medical Condition (MA) Reason for Exam: weakness FINDINGS: Mediastinum: Few mildly prominent mediastinal lymph nodes particularly in the azygous region. Bulky three-vessel calcific coronary artery disease. The heart is borderline enlarged. Thoracic aorta is normal caliber. Lungs/pleura: Status post left upper lobectomy with left hemithorax volume loss. There is extensive bilateral pulmonary fibrosis with honeycombing particularly in the left lower lobe. Localized pleural thickening or loculated small volume of pleural fluid in the anterior superior thorax, unchanged. There is bilateral lower lobe traction bronchiectasis. There is no pneumothorax or free fluid pleural effusion. Upper Abdomen: The gallbladder is surgically absent. Soft Tissues/Bones: No acute bone finding. Chronic findings of left upper lobectomy with left hemithorax volume loss, pulmonary fibrosis with honeycombing and traction bronchiectasis. No definite superimposed acute disease. XR CHEST PORTABLE    Result Date: 2/25/2022  EXAMINATION: ONE XRAY VIEW OF THE CHEST 2/24/2022 11:38 pm COMPARISON: 05/14/2021, CT chest on 10/10/2019 HISTORY: ORDERING SYSTEM PROVIDED HISTORY: chest pain TECHNOLOGIST PROVIDED HISTORY: Reason for exam:->chest pain Reason for Exam: chest pain Additional signs and symptoms: chest pain Relevant Medical/Surgical History: chest pain FINDINGS: Cardiomegaly. Progression of parenchymal airspace disease within the left upper lobe, with similar appearing chronic interstitial lung disease noted on the right. No acute osseous abnormality is identified. No pneumothorax. Chronic interstitial changes are seen in the lungs, best appreciated on prior CT imaging with known fibrosis, honeycombing and traction bronchiectasis. Changes appear stable on the right. Mild increased airspace disease in the left upper lobe could represent progression of disease, or possibly related to acute superimposed atelectasis or pneumonia. Personally reviewed Lab Studies, Imaging, and discussed case with ED provider.     Electronically signed by Randa Archibald MD on 2/25/2022 at 5:15 AM

## 2022-02-25 NOTE — ED TRIAGE NOTES
Pt presents to Ed via EMS for complaint of chest pain fatigue chills and fever starting three days ago. PT had a lobectomy on his left lung performed in 2019. Pt has a hx of cardiac issues and had stents placed by dr Ramón Ceballos in 2019. Pt states \"I just dont feel good I just cant get comfortable. Pt baseline O2 needs 2L min pt currently only requiring baseline O2 of 2Lmin. O2 sat 93% at this time. Wife at bedside at this time.

## 2022-02-25 NOTE — ED NOTES
CT CHEST WO CONTRAST    Status: Final result       Order Providers    JAYSON Zhao MD            Signed by    Signed Date/Time Phone Pager   Dinah Lucas 2/25/2022  1:23 [phone]      Reading Providers    Read Date Phone Pager   Dinah Lucas Fri Feb 25, 2022  1:23 [phone]        CT CHEST WO CONTRAST: Patient Communication     Released  Not seen     Radiation Dose Estimates    No radiation information found for this patient  Narrative   EXAMINATION:   CT OF THE CHEST WITHOUT CONTRAST 2/25/2022 12:51 am       TECHNIQUE:   CT of the chest was performed without the administration of intravenous   contrast. Multiplanar reformatted images are provided for review. Dose   modulation, iterative reconstruction, and/or weight based adjustment of the   mA/kV was utilized to reduce the radiation dose to as low as reasonably   achievable.       COMPARISON:   10/10/2019       HISTORY:   ORDERING SYSTEM PROVIDED HISTORY: weakness   TECHNOLOGIST PROVIDED HISTORY:   Reason for exam:->weakness   Decision Support Exception - unselect if not a suspected or confirmed   emergency medical condition->Emergency Medical Condition (MA)   Reason for Exam: weakness       FINDINGS:   Mediastinum: Few mildly prominent mediastinal lymph nodes particularly in the   azygous region.  Bulky three-vessel calcific coronary artery disease.  The   heart is borderline enlarged.  Thoracic aorta is normal caliber.       Lungs/pleura: Status post left upper lobectomy with left hemithorax volume   loss. [place] Sack is extensive bilateral pulmonary fibrosis with honeycombing   particularly in the left lower lobe.  Localized pleural thickening or   loculated small volume of pleural fluid in the anterior superior thorax,   unchanged.  There is bilateral lower lobe traction bronchiectasis.  There is   no pneumothorax or free fluid pleural effusion.       Upper Abdomen:  The gallbladder is surgically absent.       Soft Tissues/Bones: No acute bone finding.           Impression   Chronic findings of left upper lobectomy with left hemithorax volume loss,   pulmonary fibrosis with honeycombing and traction bronchiectasis.       No definite superimposed acute disease.           Tyree Marrufo RN  02/25/22 [id number]

## 2022-02-25 NOTE — PROGRESS NOTES
2/25. 2753. Pt admitted to unit from ER, assessment completed. Pt stable and resting quietly at present moment, denies any check pain at moment. Safety precautions observed, will cont to monitor.

## 2022-02-26 VITALS
SYSTOLIC BLOOD PRESSURE: 159 MMHG | DIASTOLIC BLOOD PRESSURE: 84 MMHG | WEIGHT: 195 LBS | RESPIRATION RATE: 18 BRPM | HEIGHT: 72 IN | BODY MASS INDEX: 26.41 KG/M2 | HEART RATE: 59 BPM | OXYGEN SATURATION: 95 % | TEMPERATURE: 98 F

## 2022-02-26 LAB — GLUCOSE BLD-MCNC: 105 MG/DL (ref 70–99)

## 2022-02-26 PROCEDURE — 82962 GLUCOSE BLOOD TEST: CPT

## 2022-02-26 PROCEDURE — 6370000000 HC RX 637 (ALT 250 FOR IP): Performed by: NURSE PRACTITIONER

## 2022-02-26 PROCEDURE — 94761 N-INVAS EAR/PLS OXIMETRY MLT: CPT

## 2022-02-26 PROCEDURE — G0378 HOSPITAL OBSERVATION PER HR: HCPCS

## 2022-02-26 PROCEDURE — 6370000000 HC RX 637 (ALT 250 FOR IP): Performed by: INTERNAL MEDICINE

## 2022-02-26 PROCEDURE — 2700000000 HC OXYGEN THERAPY PER DAY

## 2022-02-26 PROCEDURE — 96372 THER/PROPH/DIAG INJ SC/IM: CPT

## 2022-02-26 PROCEDURE — 2580000003 HC RX 258: Performed by: INTERNAL MEDICINE

## 2022-02-26 PROCEDURE — 6360000002 HC RX W HCPCS: Performed by: INTERNAL MEDICINE

## 2022-02-26 PROCEDURE — 83690 ASSAY OF LIPASE: CPT

## 2022-02-26 RX ORDER — OXYCODONE HYDROCHLORIDE AND ACETAMINOPHEN 5; 325 MG/1; MG/1
1 TABLET ORAL EVERY 6 HOURS PRN
Qty: 12 TABLET | Refills: 0 | Status: SHIPPED | OUTPATIENT
Start: 2022-02-26 | End: 2022-03-01

## 2022-02-26 RX ORDER — RANOLAZINE 500 MG/1
500 TABLET, EXTENDED RELEASE ORAL 2 TIMES DAILY
Qty: 60 TABLET | Refills: 3 | Status: SHIPPED | OUTPATIENT
Start: 2022-02-26

## 2022-02-26 RX ORDER — TRAZODONE HYDROCHLORIDE 50 MG/1
25 TABLET ORAL NIGHTLY PRN
Qty: 7 TABLET | Refills: 0 | Status: SHIPPED | OUTPATIENT
Start: 2022-02-26

## 2022-02-26 RX ADMIN — FLUDROCORTISONE ACETATE 0.1 MG: 0.1 TABLET ORAL at 08:40

## 2022-02-26 RX ADMIN — LEVOTHYROXINE SODIUM 50 MCG: 0.05 TABLET ORAL at 08:01

## 2022-02-26 RX ADMIN — DILTIAZEM HYDROCHLORIDE 120 MG: 120 CAPSULE, COATED, EXTENDED RELEASE ORAL at 08:01

## 2022-02-26 RX ADMIN — RANOLAZINE 500 MG: 500 TABLET, EXTENDED RELEASE ORAL at 08:01

## 2022-02-26 RX ADMIN — ENOXAPARIN SODIUM 40 MG: 100 INJECTION SUBCUTANEOUS at 08:01

## 2022-02-26 RX ADMIN — SODIUM CHLORIDE, PRESERVATIVE FREE 10 ML: 5 INJECTION INTRAVENOUS at 08:01

## 2022-02-26 RX ADMIN — ASPIRIN 81 MG: 81 TABLET, CHEWABLE ORAL at 08:01

## 2022-02-26 ASSESSMENT — PAIN SCALES - GENERAL: PAINLEVEL_OUTOF10: 0

## 2022-02-26 NOTE — DISCHARGE SUMMARY
V2.0  Discharge Summary    Name:  Ivon Villalobos /Age/Sex: 1940 (75 y.o. male)   Admit Date: 2022  Discharge Date: 22    MRN & CSN:  [id number] & [id number] Encounter Date and Time 22 12:10 PM EST    Attending:  No att. providers found Discharging Provider: Luis Barber [facility] Course:   [facility] a [de-identified] y. o. male with past medical history of CAD, T2DM hyperlipidemia, hypertension, pulmonary fibrosis who presents with Chest pain, failure to thrive. He underwent ischemic eval including LHC (results as below).  Patient and family elected to return home with hospice care.      Chest pain  -Troponin negative x2  -EKG with no acute ischemic change  -Cath 2020 with patent stents  -stress test pending abnormal    - LHC with LAD with ostial 50 to 60% lesion, patent LAD stent, patent circumflex and RCA, circumflex with 50 to 40% ostial and mid segment stenosis; no intervention  -Cardiology recommended risk factor modification  -Chest pain could be secondary to stress and anxiety     Pulmonary fibrosis  Chronic respiratory failure  -On 3.5 L nasal cannula at baseline, no increased oxygen requirements  -Chest x-ray with chronic interstitial changes, mild increased airspace disease in the left upper lobe  -CT chest without contrast with chronic findings of left upper lobe lobectomy with left thorax volume loss, pulmonary fibrosis with honeycombing and traction bronchiectasis  - significant dyspnea with exertion, progressively worsening   - hospice referral placed per patient and family request, patient will have home eval Monday      Generalized weakness  Acute on chronic pain   Insomnia   - presented with several weeks of slowly worsening generalized weakness  -Appears deconditioned  -TSH, B12/folate, anemia unremarkable   - likely due to progressively worsening pulmonary fibrosis, patient elected to be discharged home with hospice services   - discharge home with short course low dose percocet for pain, trazodone for sleep until hospice eval on Monday      Elevated lipase   - lipase 403, LFTs otherwise WNL  - reports chronic intermittent LLQ abdominal pain, no abdominal pain currently and benign abdominal exam   -Repeat lipase within normal limits, no further testing indicated     CAD  -With history of LAD stent 4/2019   -continue aspirin, statin     History of multifocal atrial tachycardia  -Continue Cardizem     Hypertension   - with -180s   -Has not tolerated antihypertensives in the past due to significant orthostatic hypotension  -Cardiology initially recommended to start losartan however after further discussion we will hold for now and continue to monitor, patient is also stressed and in pain which could be contributing to hypertension  - improving on discharge      Orthostatic hypotension  -Continue fludrocortisone and compression stockings     T2DM  -Resume home Metformin on discharge 48 hours after left heart cath     Hypothyroidism  -Continue home Synthroid     History of lung mass  -Status post left upper lobectomy 1/2019     Depression/anxiety  -Continue home Remeron     Dementia  -Continue home Aricept     This patient was seen and examined in conjunction with Dr. Minal Warner. He was agreeable with patient's plan at discharge as dictated above.      Consults this admission:  IP CONSULT TO HOSPITALIST  IP CONSULT TO CARDIOLOGY  IP CONSULT TO HOSPICE  IP CONSULT TO CASE MANAGEMENT    Discharge Diagnosis:   Chest pain    Discharge Instruction:   Follow up appointments:  Primary care physician: ASHLEE Corado CNP within 2 weeks  Diet: regular diet   Activity: activity as tolerated  Disposition: Discharged to:   []Home, [][facility], []SNF, []Acute Rehab, [x]Hospice   Condition on discharge: Stable  Labs and Tests to be Followed up as an outpatient by PCP or Specialist:     Discharge Medications:        Medication List      START taking these medications oxyCODONE-acetaminophen 5-325 MG per tablet  Commonly known as: PERCOCET  Take 1 tablet by mouth every 6 hours as needed for Pain for up to 3 days.      ranolazine 500 MG extended release tablet  Commonly known as: RANEXA  Take 1 tablet by mouth 2 times daily     traZODone 50 MG tablet  Commonly known as: DESYREL  Take 0.5 tablets by mouth nightly as needed for Sleep        CONTINUE taking these medications    aspirin 81 MG EC tablet  Take 1 tablet by mouth every morning Over The Counter     atorvastatin 20 MG tablet  Commonly known as: LIPITOR  Take 1 tablet by mouth daily     bisacodyl 5 MG EC tablet  Commonly known as: DULCOLAX     Compression Stockings Misc  by Does not apply route 15 to 20 mmhg     dilTIAZem 120 MG extended release capsule  Commonly known as: CARDIZEM CD  TAKE 1 CAPSULE BY MOUTH EVERY DAY     donepezil 10 MG tablet  Commonly known as: Aricept  Take 1 tablet by mouth nightly     fludrocortisone 0.1 MG tablet  Commonly known as: FLORINEF     levothyroxine 50 MCG tablet  Commonly known as: SYNTHROID  Take 1 tablet by mouth Daily     metFORMIN 500 MG tablet  Commonly known as: GLUCOPHAGE     mirtazapine 15 MG tablet  Commonly known as: REMERON     Vitamin D3 125 MCG (5000 UT) Tabs           Where to Get Your Medications      These medications were sent to [facility]/pharmacy #[id number]- [address] - P [phone] - F [phone]  [address] [facility] [address]    Phone: [phone]   · oxyCODONE-acetaminophen 5-325 MG per tablet  · ranolazine 500 MG extended release tablet  · traZODone 50 MG tablet        Objective Findings at Discharge:   BP (!) 159/84   Pulse 59   Temp 98 °F (36.7 °C) (Oral)   Resp 18   Ht 6' (1.829 m)   Wt 195 lb (88.5 kg)   SpO2 95%   BMI 26.45 kg/m²       Physical Exam:   General: NAD, laying in bed, appears deconditioned   Eyes: EOMI  ENT: neck supple  Cardiovascular: Regular rate and rhythm   Respiratory: Clear to auscultation bilaterally anteriorly Gastrointestinal: Abdomen soft, non tender  Genitourinary: no suprapubic tenderness  Musculoskeletal: No edema  Skin: warm, dry  Neuro: Alert. Psych: Mood depressed         Labs and Imaging   Echocardiogram complete 2D with doppler with color    Result Date: 2/25/2022  Transthoracic Echocardiography Report (TTE)  Demographics   Patient Name      Ny SALGADO   Date of Study       02/25/2022   Date of Birth     1940          Gender              Male   Age               [de-identified] year(s)          Race                   Patient Number    [id number]          Room Number         5585   Visit Number      [id number]   Corporate ID      [id number]   Accession Number  [id number]          Kassie Castillo RDMS, RVT   Ordering          Linda Beal CNP  Interpreting        George Pate  Physician                             Physician           MD  Procedure Type of Study   TTE procedure:ECHOCARDIOGRAM COMPLETE 2D W DOPPLER W COLOR. Procedure Date Date: 02/25/2022 Start: 09:28 AM Study Location: Portable Technical Quality: Fair visualization Indications:Chest pain. Patient Status: Routine Height: 72 inches Weight: 195 pounds BSA: 2.11 m2 BMI: 26.45 kg/m2 HR: 60 bpm BP: 178/92 mmHg  Conclusions   Summary  Left ventricular systolic function is normal.  Ejection fraction is visually estimated at 50%. Mild to moderate tricuspid regurgitation; RVSP: 42 mmHg. No evidence of any pericardial effusion. Signature   ------------------------------------------------------------------  Electronically signed by George Pate MD (Interpreting  physician) on 02/25/2022 at 11:49 AM  ------------------------------------------------------------------   Findings   Left Ventricle  Left ventricular systolic function is normal.  Ejection fraction is visually estimated at 50%. No regional wall motion abnormalites.   Left ventricle size is normal.  Normal diastolic function. Left Atrium  Essentially normal left atrium. Right Atrium  Essentially normal right atrium. Right Ventricle  Mildly dilated right ventricle. Aortic Valve  Structurally normal aortic valve. Mitral Valve  Mild mitral regurgitation. Tricuspid Valve  Mild to moderate tricuspid regurgitation; RVSP: 42 mmHg. Pulmonic Valve  Trace pulmonic regurgitation present. Pericardial Effusion  No evidence of any pericardial effusion. Pleural Effusion  No evidence of pleural effusion. Miscellaneous  IVC and abdominal aorta are within normal limits.   M-Mode/2D Measurements & Calculations   LV Diastolic Dimension:  LV Systolic Dimension:  LA Dimension: 3.7 cmAO Root  4.82 cm                  3.65 cm                 Dimension: 3 cmLA Area:  LV FS:24.3 %             LV Volume Diastolic: 66 12.0 cm2  LV PW Diastolic: 8.22 cm ml  LV PW Systolic: 9.98 cm  LV Volume Systolic: 26  Septum Diastolic: 7.74   ml  cm                       LV EDV/LV EDV Index: 66 RV Diastolic Dimension: 3.5  Septum Systolic: 3.96 cm LR/83 X3OX ESV/LV ESV   cm  CO: 3.53 l/min           Index: 26 ml/12 m2  CI: 1.67 l/m*m2          EF Calculated (A4C):    LA/Aorta: 1.23                           60.6 %  LV Area Diastolic: 79.6  EF Calculated (2D):     LA volume/Index: 31 ml  cm2                      48.3 %                  /81D4  LV Area Systolic: 64.7  cm2                      LV Length: 6.69 cm                            LVOT: 2.1 cm  Doppler Measurements & Calculations   MV Peak E-Wave: 44.4  AV Peak Velocity: 107 cm/s  LVOT Peak Velocity: 94.7  cm/s                  AV Peak Gradient: 4.58 mmHg cm/s  MV Peak A-Wave: 67.1  AV Mean Velocity: 67.3 cm/s LVOT Mean Velocity: 56.9  cm/s                  AV Mean Gradient: 2 mmHg    cm/s  MV E/A Ratio: 0.66    AV VTI: 20.3 cm             LVOT Peak Gradient: 4  MV Peak Gradient:     AV Area (Continuity):2.9    mmHgLVOT Mean Gradient: 2  0.79 mmHg             cm2 mmHg                                                    Estimated RVSP: 42 mmHg  MV P1/2t: 63 msec     LVOT VTI: 17 cm             Estimated RAP:3 mmHg  MVA by PHT:3.49 cm2                        Estimated PASP: 41.94 mmHg  MV E' Septal                                      TR Velocity:312 cm/s  Velocity: 4.28 cm/s                               TR Gradient:38.94 mmHg  MV E' Lateral  Velocity: 5.7 cm/s  MV E/E' septal: 10.37  MV E/E' lateral: 7.79      CT CHEST WO CONTRAST    Result Date: 2/25/2022  EXAMINATION: CT OF THE CHEST WITHOUT CONTRAST 2/25/2022 12:51 am TECHNIQUE: CT of the chest was performed without the administration of intravenous contrast. Multiplanar reformatted images are provided for review. Dose modulation, iterative reconstruction, and/or weight based adjustment of the mA/kV was utilized to reduce the radiation dose to as low as reasonably achievable. COMPARISON: 10/10/2019 HISTORY: ORDERING SYSTEM PROVIDED HISTORY: weakness TECHNOLOGIST PROVIDED HISTORY: Reason for exam:->weakness Decision Support Exception - unselect if not a suspected or confirmed emergency medical condition->Emergency Medical Condition (MA) Reason for Exam: weakness FINDINGS: Mediastinum: Few mildly prominent mediastinal lymph nodes particularly in the azygous region. Bulky three-vessel calcific coronary artery disease. The heart is borderline enlarged. Thoracic aorta is normal caliber. Lungs/pleura: Status post left upper lobectomy with left hemithorax volume loss. There is extensive bilateral pulmonary fibrosis with honeycombing particularly in the left lower lobe. Localized pleural thickening or loculated small volume of pleural fluid in the anterior superior thorax, unchanged. There is bilateral lower lobe traction bronchiectasis. There is no pneumothorax or free fluid pleural effusion. Upper Abdomen: The gallbladder is surgically absent. Soft Tissues/Bones: No acute bone finding.      Chronic findings of left upper lobectomy with left hemithorax volume loss, pulmonary fibrosis with honeycombing and traction bronchiectasis. No definite superimposed acute disease. XR CHEST PORTABLE    Result Date: 2/25/2022  EXAMINATION: ONE XRAY VIEW OF THE CHEST 2/24/2022 11:38 pm COMPARISON: 05/14/2021, CT chest on 10/10/2019 HISTORY: ORDERING SYSTEM PROVIDED HISTORY: chest pain TECHNOLOGIST PROVIDED HISTORY: Reason for exam:->chest pain Reason for Exam: chest pain Additional signs and symptoms: chest pain Relevant Medical/Surgical History: chest pain FINDINGS: Cardiomegaly. Progression of parenchymal airspace disease within the left upper lobe, with similar appearing chronic interstitial lung disease noted on the right. No acute osseous abnormality is identified. No pneumothorax. Chronic interstitial changes are seen in the lungs, best appreciated on prior CT imaging with known fibrosis, honeycombing and traction bronchiectasis. Changes appear stable on the right. Mild increased airspace disease in the left upper lobe could represent progression of disease, or possibly related to acute superimposed atelectasis or pneumonia.      NM MYOCARDIAL SPECT REST EXERCISE OR RX    Result Date: 2/25/2022  Cardiac Perfusion Imaging   Demographics   Patient Name      Tegan SALGADO  Date of study        02/25/2022   Date of Birth     1940         Gender               Male   Age               [de-identified] year(s)         Race                    Patient Number    [id number]         Room Number          0913   Visit Number      [id number]          Height               72 inches   Corporate ID      [id number]           Weight               195 pounds   Accession Number  [id number]                                        NM Technologist      [place] GREGORIOMT   Ordering          Feliciano Damon CNP Interpreting         Tolu Frausto  Physician arrival, the patient is identified using two identifiers and  the physician order is verified. An IV is established and 8-11mCi of 99mTc  Sestamibi is intravenously injected and followed with 10mL 0.9% Normal  Saline flush. A circulation period of 45 minutes occurs prior to resting  SPECT imaging. After imaging is complete the patient is escorted to the  stress lab. The patient is connected to the ECG and blood pressure is  measured. The RN starts the stress portion of the exam and rapidly  intravenously injects Lexiscan (regadenosine) 0.4mg over a period of 10 to15  seconds and follows with 5mL 0.9% Normal Saline flush. Immediately following  the Nuclear Technologist intravenously injects 22-33mCi of 99mTc Sestamibi  and 5mL 0.9% Normal Saline flush. After completion, recovery, and removal of  the IV, the patient rests during the second circulation period of 45  minutes. Final stress SPECT gated imaging is performed. The patient may  return home or to their room after stress imaging. The images are processed  and final charting is completed and sent to the appropriate cardiologist for  interpretation and reporting. Perfusion Interpretation   Large sized defect of moderate severity which is reversible involving  anterior wall of myocardium. LVEF 50%  Imaging Results    Summed scores     - Summed stress score: 13     - Summed rest score: 5     - Summed difference score:    8   Rest ejection  Ejection fraction:50 %  EDV :84 ml  ESV :42 ml  Stroke volume :42 ml  Medical History   Accession#:  [id number]  Admission Data Admission date: 02/24/2022 Admission Time: 23:04 Hospital Status: Inpatient.       CBC:   Recent Labs     02/25/22 0035 02/25/22 1811   WBC 9.4 8.8   HGB 11.7* 11.4*    230     BMP:    Recent Labs     02/25/22 0035 02/25/22 1811    138   K 4.0 4.0    101   CO2 24 25   BUN 20 15   CREATININE 1.0 0.9   GLUCOSE 113* 151*     Hepatic:   Recent Labs     02/25/22 0035 02/25/22 1811 AST 27 28   ALT 19 18   BILITOT 0.4 0.5   ALKPHOS 75 73     Lipids:   Lab Results   Component Value Date    CHOL 117 11/02/2018    HDL 43 11/02/2018    TRIG 178 11/02/2018     Hemoglobin A1C:   Lab Results   Component Value Date    LABA1C 8.8 05/17/2021     TSH:   Lab Results   Component Value Date    TSH 2.370 11/02/2018     Troponin:   Lab Results   Component Value Date    TROPONINT <0.010 02/25/2022    TROPONINT <0.010 02/25/2022    TROPONINT <0.010 02/25/2022     Lactic Acid: No results for input(s): LACTA in the last 72 hours.   BNP:   Recent Labs     02/25/22  0035   PROBNP 255.8     UA:  Lab Results   Component Value Date    NITRU NEGATIVE 02/25/2022    COLORU YELLOW 02/25/2022    WBCUA NONE SEEN 02/25/2022    RBCUA 1 02/25/2022    MUCUS RARE 02/25/2022    TRICHOMONAS NONE SEEN 05/31/2020    BACTERIA NEGATIVE 02/25/2022    CLARITYU CLEAR 02/25/2022    SPECGRAV 1.023 02/25/2022    LEUKOCYTESUR NEGATIVE 02/25/2022    UROBILINOGEN 0.2 02/25/2022    BILIRUBINUR NEGATIVE 02/25/2022    BLOODU NEGATIVE 02/25/2022    KETUA TRACE 02/25/2022     Urine Cultures: No results found for: LABURIN  Blood Cultures: No results found for: BC  No results found for: BLOODCULT2  Organism: No results found for: ORG    Time Spent Discharging patient 35 minutes    Electronically signed by Suyapa Perez PA-C on 2/26/2022 at 12:10 PM

## 2022-02-26 NOTE — PROGRESS NOTES
I loaned an oxygen tank and carrier to Mr Brad Pimentel in order for him to make it to his house. I spoke with [facility] DME on call technician  And she said she would go by this week and pick the tank up. He has oxygen at his house.

## 2022-02-26 NOTE — CARE COORDINATION
LSW received a call form [facility] and they have a meeting set up to talk with the family at the pt home on Monday. PURNIMAW PS NP and informed her pt can be discharged and gave her the above info.   LSW faxed Hospice order, face sheet and H&P.

## 2022-02-26 NOTE — CARE COORDINATION
LSW spoke with pt and family and they are agreeable to Hospice consult and requested [facility]. LSW called [facility] and gave referral. They will reach out to the family to set up a meeting. LSW asked for a return call if they plan to have meeting in the hospital or at home.   RN stated she will talk with her Supervisor and get back with this LSW

## 2022-07-14 RX ORDER — METOCLOPRAMIDE 10 MG/1
10 TABLET ORAL 4 TIMES DAILY
Qty: 120 TABLET | Refills: 0 | OUTPATIENT
Start: 2022-07-14

## 2023-09-07 NOTE — PROGRESS NOTES
ELBA Frost at bedside for wound repair.   Pt has ambulated twice with me today both times taking one lap around the unit. Pt sp02 stayed above 92% during both walks.
